# Patient Record
Sex: FEMALE | Race: WHITE | NOT HISPANIC OR LATINO
[De-identification: names, ages, dates, MRNs, and addresses within clinical notes are randomized per-mention and may not be internally consistent; named-entity substitution may affect disease eponyms.]

---

## 2018-08-13 ENCOUNTER — APPOINTMENT (OUTPATIENT)
Dept: ULTRASOUND IMAGING | Facility: CLINIC | Age: 48
End: 2018-08-13
Payer: COMMERCIAL

## 2018-08-13 ENCOUNTER — TRANSCRIPTION ENCOUNTER (OUTPATIENT)
Age: 48
End: 2018-08-13

## 2018-08-13 ENCOUNTER — OUTPATIENT (OUTPATIENT)
Dept: OUTPATIENT SERVICES | Facility: HOSPITAL | Age: 48
LOS: 1 days | End: 2018-08-13
Payer: COMMERCIAL

## 2018-08-13 DIAGNOSIS — Z00.8 ENCOUNTER FOR OTHER GENERAL EXAMINATION: ICD-10-CM

## 2018-08-13 DIAGNOSIS — M79.89 OTHER SPECIFIED SOFT TISSUE DISORDERS: ICD-10-CM

## 2018-08-13 PROBLEM — Z00.00 ENCOUNTER FOR PREVENTIVE HEALTH EXAMINATION: Status: ACTIVE | Noted: 2018-08-13

## 2018-08-13 PROCEDURE — 93971 EXTREMITY STUDY: CPT

## 2018-08-13 PROCEDURE — 93971 EXTREMITY STUDY: CPT | Mod: 26,RT

## 2018-12-20 ENCOUNTER — EMERGENCY (EMERGENCY)
Facility: HOSPITAL | Age: 48
LOS: 1 days | Discharge: HOME | End: 2018-12-20
Attending: EMERGENCY MEDICINE
Payer: COMMERCIAL

## 2018-12-20 ENCOUNTER — TRANSCRIPTION ENCOUNTER (OUTPATIENT)
Age: 48
End: 2018-12-20

## 2018-12-20 VITALS
RESPIRATION RATE: 18 BRPM | DIASTOLIC BLOOD PRESSURE: 102 MMHG | HEIGHT: 63 IN | WEIGHT: 197.98 LBS | OXYGEN SATURATION: 98 % | TEMPERATURE: 98 F | SYSTOLIC BLOOD PRESSURE: 164 MMHG | HEART RATE: 88 BPM

## 2018-12-20 VITALS
OXYGEN SATURATION: 99 % | TEMPERATURE: 98 F | SYSTOLIC BLOOD PRESSURE: 135 MMHG | RESPIRATION RATE: 16 BRPM | HEART RATE: 75 BPM | DIASTOLIC BLOOD PRESSURE: 65 MMHG

## 2018-12-20 LAB
ALBUMIN SERPL ELPH-MCNC: 4.5 G/DL — SIGNIFICANT CHANGE UP (ref 3.3–5.2)
ALP SERPL-CCNC: 72 U/L — SIGNIFICANT CHANGE UP (ref 40–120)
ALT FLD-CCNC: 8 U/L — SIGNIFICANT CHANGE UP
ANION GAP SERPL CALC-SCNC: 13 MMOL/L — SIGNIFICANT CHANGE UP (ref 5–17)
AST SERPL-CCNC: 19 U/L — SIGNIFICANT CHANGE UP
BASOPHILS # BLD AUTO: 0 K/UL — SIGNIFICANT CHANGE UP (ref 0–0.2)
BASOPHILS NFR BLD AUTO: 0.1 % — SIGNIFICANT CHANGE UP (ref 0–2)
BILIRUB SERPL-MCNC: 0.3 MG/DL — LOW (ref 0.4–2)
BUN SERPL-MCNC: 7 MG/DL — LOW (ref 8–20)
CALCIUM SERPL-MCNC: 8.6 MG/DL — SIGNIFICANT CHANGE UP (ref 8.6–10.2)
CHLORIDE SERPL-SCNC: 104 MMOL/L — SIGNIFICANT CHANGE UP (ref 98–107)
CO2 SERPL-SCNC: 22 MMOL/L — SIGNIFICANT CHANGE UP (ref 22–29)
CREAT SERPL-MCNC: 0.62 MG/DL — SIGNIFICANT CHANGE UP (ref 0.5–1.3)
EOSINOPHIL # BLD AUTO: 0 K/UL — SIGNIFICANT CHANGE UP (ref 0–0.5)
EOSINOPHIL NFR BLD AUTO: 0.1 % — SIGNIFICANT CHANGE UP (ref 0–6)
GLUCOSE SERPL-MCNC: 147 MG/DL — HIGH (ref 70–115)
HCT VFR BLD CALC: 36.6 % — LOW (ref 37–47)
HGB BLD-MCNC: 10.6 G/DL — LOW (ref 12–16)
LYMPHOCYTES # BLD AUTO: 0.8 K/UL — LOW (ref 1–4.8)
LYMPHOCYTES # BLD AUTO: 6.6 % — LOW (ref 20–55)
MCHC RBC-ENTMCNC: 22.7 PG — LOW (ref 27–31)
MCHC RBC-ENTMCNC: 29 G/DL — LOW (ref 32–36)
MCV RBC AUTO: 78.4 FL — LOW (ref 81–99)
MONOCYTES # BLD AUTO: 0.7 K/UL — SIGNIFICANT CHANGE UP (ref 0–0.8)
MONOCYTES NFR BLD AUTO: 5.8 % — SIGNIFICANT CHANGE UP (ref 3–10)
NEUTROPHILS # BLD AUTO: 10.6 K/UL — HIGH (ref 1.8–8)
NEUTROPHILS NFR BLD AUTO: 87.2 % — HIGH (ref 37–73)
PLATELET # BLD AUTO: 259 K/UL — SIGNIFICANT CHANGE UP (ref 150–400)
POTASSIUM SERPL-MCNC: 3.5 MMOL/L — SIGNIFICANT CHANGE UP (ref 3.5–5.3)
POTASSIUM SERPL-SCNC: 3.5 MMOL/L — SIGNIFICANT CHANGE UP (ref 3.5–5.3)
PROT SERPL-MCNC: 7.3 G/DL — SIGNIFICANT CHANGE UP (ref 6.6–8.7)
RBC # BLD: 4.67 M/UL — SIGNIFICANT CHANGE UP (ref 4.4–5.2)
RBC # FLD: 17.4 % — HIGH (ref 11–15.6)
SODIUM SERPL-SCNC: 139 MMOL/L — SIGNIFICANT CHANGE UP (ref 135–145)
TROPONIN T SERPL-MCNC: <0.01 NG/ML — SIGNIFICANT CHANGE UP (ref 0–0.06)
WBC # BLD: 12.2 K/UL — HIGH (ref 4.8–10.8)
WBC # FLD AUTO: 12.2 K/UL — HIGH (ref 4.8–10.8)

## 2018-12-20 PROCEDURE — 99284 EMERGENCY DEPT VISIT MOD MDM: CPT | Mod: 25

## 2018-12-20 PROCEDURE — 93005 ELECTROCARDIOGRAM TRACING: CPT

## 2018-12-20 PROCEDURE — 85027 COMPLETE CBC AUTOMATED: CPT

## 2018-12-20 PROCEDURE — 70450 CT HEAD/BRAIN W/O DYE: CPT

## 2018-12-20 PROCEDURE — 36415 COLL VENOUS BLD VENIPUNCTURE: CPT

## 2018-12-20 PROCEDURE — 99284 EMERGENCY DEPT VISIT MOD MDM: CPT

## 2018-12-20 PROCEDURE — 80053 COMPREHEN METABOLIC PANEL: CPT

## 2018-12-20 PROCEDURE — 93010 ELECTROCARDIOGRAM REPORT: CPT

## 2018-12-20 PROCEDURE — 96365 THER/PROPH/DIAG IV INF INIT: CPT

## 2018-12-20 PROCEDURE — 84484 ASSAY OF TROPONIN QUANT: CPT

## 2018-12-20 PROCEDURE — 70450 CT HEAD/BRAIN W/O DYE: CPT | Mod: 26

## 2018-12-20 RX ORDER — BUPROPION HYDROCHLORIDE 150 MG/1
150 TABLET, EXTENDED RELEASE ORAL
Qty: 0 | Refills: 0 | COMMUNITY

## 2018-12-20 RX ORDER — DIAZEPAM 5 MG
5 TABLET ORAL ONCE
Qty: 0 | Refills: 0 | Status: DISCONTINUED | OUTPATIENT
Start: 2018-12-20 | End: 2018-12-20

## 2018-12-20 RX ORDER — METOCLOPRAMIDE HCL 10 MG
10 TABLET ORAL ONCE
Qty: 0 | Refills: 0 | Status: COMPLETED | OUTPATIENT
Start: 2018-12-20 | End: 2018-12-20

## 2018-12-20 RX ORDER — SODIUM CHLORIDE 9 MG/ML
1000 INJECTION INTRAMUSCULAR; INTRAVENOUS; SUBCUTANEOUS ONCE
Qty: 0 | Refills: 0 | Status: COMPLETED | OUTPATIENT
Start: 2018-12-20 | End: 2018-12-20

## 2018-12-20 RX ORDER — TOPIRAMATE 25 MG
1 TABLET ORAL
Qty: 0 | Refills: 0 | COMMUNITY

## 2018-12-20 RX ORDER — GLIMEPIRIDE 1 MG
1 TABLET ORAL
Qty: 0 | Refills: 0 | COMMUNITY

## 2018-12-20 RX ORDER — CLONAZEPAM 1 MG
1 TABLET ORAL
Qty: 0 | Refills: 0 | COMMUNITY

## 2018-12-20 RX ORDER — LITHIUM CARBONATE 300 MG/1
1 TABLET, EXTENDED RELEASE ORAL
Qty: 0 | Refills: 0 | COMMUNITY

## 2018-12-20 RX ADMIN — Medication 104 MILLIGRAM(S): at 20:02

## 2018-12-20 RX ADMIN — SODIUM CHLORIDE 1000 MILLILITER(S): 9 INJECTION INTRAMUSCULAR; INTRAVENOUS; SUBCUTANEOUS at 20:02

## 2018-12-20 RX ADMIN — Medication 5 MILLIGRAM(S): at 20:03

## 2018-12-20 RX ADMIN — Medication 10 MILLIGRAM(S): at 20:32

## 2018-12-20 RX ADMIN — SODIUM CHLORIDE 1000 MILLILITER(S): 9 INJECTION INTRAMUSCULAR; INTRAVENOUS; SUBCUTANEOUS at 21:02

## 2018-12-20 NOTE — ED ADULT TRIAGE NOTE - CHIEF COMPLAINT QUOTE
pt BIBA for reports of "worst headache that's not a migraine pt with hx migraines, photosensitivity and nausea present, pt with jaw pain and SOB/chest pain this AM.

## 2018-12-20 NOTE — ED PROVIDER NOTE - OBJECTIVE STATEMENT
47 y/o F with PMH of HTN and DM c/o sudden onset of headache this afternoon at 3:45pm.  Patient also c/o nausea and photophobia.  Patient denies vomiting.  She states that she has hx of migraines and that this headache is the same intensity level as her usual migraines.  However, patient states that her migraines are usually in the back of her head whereas this headache is on the top.  Patient also states that she had some chest pain earlier this morning which has now resolved.  Denies blurry vision, double vision, focal weaknesses, fever, LOC or any other complaints.

## 2018-12-20 NOTE — ED PROVIDER NOTE - ATTENDING CONTRIBUTION TO CARE
Pt. awake and alert. No acute distress. No nuchal rigidity. labs and CT scan discussed with patient. I have discussed the plan with the ACP.

## 2018-12-30 ENCOUNTER — TRANSCRIPTION ENCOUNTER (OUTPATIENT)
Age: 48
End: 2018-12-30

## 2020-01-08 ENCOUNTER — TELEPHONE (OUTPATIENT)
Dept: URGENT CARE | Facility: CLINIC | Age: 50
End: 2020-01-08

## 2020-01-08 ENCOUNTER — HOSPITAL ENCOUNTER (EMERGENCY)
Facility: HOSPITAL | Age: 50
Discharge: HOME | End: 2020-01-08
Attending: EMERGENCY MEDICINE | Admitting: EMERGENCY MEDICINE

## 2020-01-08 VITALS
BODY MASS INDEX: 38.98 KG/M2 | OXYGEN SATURATION: 97 % | HEIGHT: 63 IN | TEMPERATURE: 98.1 F | HEART RATE: 79 BPM | RESPIRATION RATE: 18 BRPM | DIASTOLIC BLOOD PRESSURE: 93 MMHG | SYSTOLIC BLOOD PRESSURE: 149 MMHG | WEIGHT: 220 LBS

## 2020-01-08 DIAGNOSIS — E86.0 DEHYDRATION: ICD-10-CM

## 2020-01-08 DIAGNOSIS — R73.9 HYPERGLYCEMIA: Primary | ICD-10-CM

## 2020-01-08 DIAGNOSIS — Z91.14 HISTORY OF MEDICATION NONCOMPLIANCE: ICD-10-CM

## 2020-01-08 LAB
ALBUMIN SERPL-MCNC: 4.5 G/DL (ref 3.5–5.2)
ALBUMIN/GLOB SERPL: 1.5 G/DL
ALP SERPL-CCNC: 99 U/L (ref 39–117)
ALT SERPL W P-5'-P-CCNC: 21 U/L (ref 1–33)
ANION GAP SERPL CALCULATED.3IONS-SCNC: 12 MMOL/L (ref 5–15)
AST SERPL-CCNC: 37 U/L (ref 1–32)
B-OH-BUTYR SERPL-SCNC: 0.2 MMOL/L (ref 0.02–0.27)
BASOPHILS # BLD AUTO: 0.05 10*3/MM3 (ref 0–0.2)
BASOPHILS NFR BLD AUTO: 0.6 % (ref 0–1.5)
BILIRUB SERPL-MCNC: 0.3 MG/DL (ref 0.2–1.2)
BILIRUB UR QL STRIP: NEGATIVE
BUN BLD-MCNC: 11 MG/DL (ref 6–20)
BUN/CREAT SERPL: 13.8 (ref 7–25)
CALCIUM SPEC-SCNC: 9.6 MG/DL (ref 8.6–10.5)
CHLORIDE SERPL-SCNC: 101 MMOL/L (ref 98–107)
CLARITY UR: CLEAR
CO2 SERPL-SCNC: 20 MMOL/L (ref 22–29)
COLOR UR: YELLOW
CREAT BLD-MCNC: 0.8 MG/DL (ref 0.57–1)
DEPRECATED RDW RBC AUTO: 45.8 FL (ref 37–54)
EOSINOPHIL # BLD AUTO: 0.18 10*3/MM3 (ref 0–0.4)
EOSINOPHIL NFR BLD AUTO: 2.1 % (ref 0.3–6.2)
ERYTHROCYTE [DISTWIDTH] IN BLOOD BY AUTOMATED COUNT: 16.2 % (ref 12.3–15.4)
GFR SERPL CREATININE-BSD FRML MDRD: 77 ML/MIN/1.73
GLOBULIN UR ELPH-MCNC: 3 GM/DL
GLUCOSE BLD-MCNC: 359 MG/DL (ref 65–99)
GLUCOSE BLDC GLUCOMTR-MCNC: 150 MG/DL (ref 70–130)
GLUCOSE BLDC GLUCOMTR-MCNC: 268 MG/DL (ref 70–130)
GLUCOSE UR STRIP-MCNC: ABNORMAL MG/DL
HBA1C MFR BLD: 12.2 % (ref 4.8–5.6)
HCT VFR BLD AUTO: 43.9 % (ref 34–46.6)
HGB BLD-MCNC: 12.4 G/DL (ref 12–15.9)
HGB UR QL STRIP.AUTO: NEGATIVE
HOLD SPECIMEN: NORMAL
HOLD SPECIMEN: NORMAL
IMM GRANULOCYTES # BLD AUTO: 0.03 10*3/MM3 (ref 0–0.05)
IMM GRANULOCYTES NFR BLD AUTO: 0.4 % (ref 0–0.5)
KETONES UR QL STRIP: NEGATIVE
LEUKOCYTE ESTERASE UR QL STRIP.AUTO: NEGATIVE
LYMPHOCYTES # BLD AUTO: 1.46 10*3/MM3 (ref 0.7–3.1)
LYMPHOCYTES NFR BLD AUTO: 17.2 % (ref 19.6–45.3)
MCH RBC QN AUTO: 22.7 PG (ref 26.6–33)
MCHC RBC AUTO-ENTMCNC: 28.2 G/DL (ref 31.5–35.7)
MCV RBC AUTO: 80.4 FL (ref 79–97)
MONOCYTES # BLD AUTO: 0.66 10*3/MM3 (ref 0.1–0.9)
MONOCYTES NFR BLD AUTO: 7.8 % (ref 5–12)
NEUTROPHILS # BLD AUTO: 6.1 10*3/MM3 (ref 1.7–7)
NEUTROPHILS NFR BLD AUTO: 71.9 % (ref 42.7–76)
NITRITE UR QL STRIP: NEGATIVE
NRBC BLD AUTO-RTO: 0 /100 WBC (ref 0–0.2)
PH UR STRIP.AUTO: 7 [PH] (ref 5–8)
PLATELET # BLD AUTO: 168 10*3/MM3 (ref 140–450)
POTASSIUM BLD-SCNC: 4.6 MMOL/L (ref 3.5–5.2)
PROT SERPL-MCNC: 7.5 G/DL (ref 6–8.5)
PROT UR QL STRIP: NEGATIVE
RBC # BLD AUTO: 5.46 10*6/MM3 (ref 3.77–5.28)
SODIUM BLD-SCNC: 133 MMOL/L (ref 136–145)
SP GR UR STRIP: 1.03 (ref 1–1.03)
UROBILINOGEN UR QL STRIP: ABNORMAL
WBC NRBC COR # BLD: 8.48 10*3/MM3 (ref 3.4–10.8)
WHOLE BLOOD HOLD SPECIMEN: NORMAL
WHOLE BLOOD HOLD SPECIMEN: NORMAL

## 2020-01-08 PROCEDURE — 82962 GLUCOSE BLOOD TEST: CPT

## 2020-01-08 PROCEDURE — 81003 URINALYSIS AUTO W/O SCOPE: CPT | Performed by: EMERGENCY MEDICINE

## 2020-01-08 PROCEDURE — 82010 KETONE BODYS QUAN: CPT | Performed by: EMERGENCY MEDICINE

## 2020-01-08 PROCEDURE — 80053 COMPREHEN METABOLIC PANEL: CPT | Performed by: EMERGENCY MEDICINE

## 2020-01-08 PROCEDURE — 85025 COMPLETE CBC W/AUTO DIFF WBC: CPT | Performed by: EMERGENCY MEDICINE

## 2020-01-08 PROCEDURE — 99284 EMERGENCY DEPT VISIT MOD MDM: CPT

## 2020-01-08 PROCEDURE — 83036 HEMOGLOBIN GLYCOSYLATED A1C: CPT | Performed by: EMERGENCY MEDICINE

## 2020-01-08 PROCEDURE — 63710000001 INSULIN REGULAR HUMAN PER 5 UNITS: Performed by: EMERGENCY MEDICINE

## 2020-01-08 RX ORDER — SODIUM CHLORIDE 0.9 % (FLUSH) 0.9 %
10 SYRINGE (ML) INJECTION AS NEEDED
Status: DISCONTINUED | OUTPATIENT
Start: 2020-01-08 | End: 2020-01-08 | Stop reason: HOSPADM

## 2020-01-08 RX ORDER — TOPIRAMATE 100 MG/1
100 TABLET, FILM COATED ORAL 2 TIMES DAILY
COMMUNITY

## 2020-01-08 RX ORDER — LITHIUM CARBONATE 300 MG/1
300 CAPSULE ORAL 2 TIMES DAILY WITH MEALS
COMMUNITY

## 2020-01-08 RX ORDER — LANCETS 28 GAUGE
EACH MISCELLANEOUS
Qty: 60 EACH | Refills: 0 | Status: SHIPPED | OUTPATIENT
Start: 2020-01-08 | End: 2021-09-13

## 2020-01-08 RX ORDER — CLONAZEPAM 0.5 MG/1
0.5 TABLET ORAL 3 TIMES DAILY PRN
COMMUNITY
End: 2021-01-26

## 2020-01-08 RX ORDER — PRAVASTATIN SODIUM 40 MG
40 TABLET ORAL DAILY
COMMUNITY
End: 2020-01-23 | Stop reason: SDUPTHER

## 2020-01-08 RX ORDER — ENALAPRIL MALEATE 5 MG/1
5 TABLET ORAL DAILY
COMMUNITY
End: 2020-01-23 | Stop reason: SDUPTHER

## 2020-01-08 RX ORDER — BUPROPION HYDROCHLORIDE 150 MG/1
150 TABLET ORAL DAILY
COMMUNITY

## 2020-01-08 RX ORDER — GLIMEPIRIDE 2 MG/1
1 TABLET ORAL 2 TIMES DAILY
Qty: 60 TABLET | Refills: 0 | Status: SHIPPED | OUTPATIENT
Start: 2020-01-08 | End: 2020-01-23 | Stop reason: SDUPTHER

## 2020-01-08 RX ORDER — GLIMEPIRIDE 2 MG/1
2 TABLET ORAL 2 TIMES DAILY
COMMUNITY
End: 2020-01-08 | Stop reason: SDUPTHER

## 2020-01-08 RX ADMIN — INSULIN HUMAN 8 UNITS: 100 INJECTION, SOLUTION PARENTERAL at 18:46

## 2020-01-08 RX ADMIN — SODIUM CHLORIDE 1000 ML: 9 INJECTION, SOLUTION INTRAVENOUS at 18:26

## 2020-01-08 NOTE — ED PROVIDER NOTES
Subjective   Patricia Brown is a 49 y.o.female who presents to the ED with complaints of increased frequency of urination. The patient has been experiencing increased frequency of urination recently. Of note, she has a history of diabetes. She moved to [place] three months ago and she does not have a primary care provider. She states she has been out of her diabetes medications since August. She takes Pravastatin, Glimepiride, and Trulicity daily. She states she can not take Metformin. She adds that her blood sugar has been in the 300's lately and it is usually around 120. She denies any abdominal pain. There are no other complaints at this time.       History provided by:  Patient  Urinary Frequency   Location:  Urinary  Quality:  Increased frequency  Severity:  Moderate  Onset quality:  Sudden  Duration: couple days.  Timing:  Constant  Progression:  Unchanged  Chronicity:  New  Context:  Hx DM. has not taken meds since August. Polyuria  Relieved by:  None tried  Worsened by:  Nothing  Ineffective treatments:  None tried  Associated symptoms: no abdominal pain        Review of Systems   Gastrointestinal: Negative for abdominal pain.   Endocrine: Positive for polyuria.   Genitourinary: Positive for frequency.   All other systems reviewed and are negative.      Past Medical History:   Diagnosis Date   • Bipolar 1 disorder (CMS/HCC)    • Cancer (CMS/HCC)     hx cervical cancer   • Diabetes mellitus (CMS/HCC)    • Hypertension        No Known Allergies    Past Surgical History:   Procedure Laterality Date   • CERVIX SURGERY      cervical scraping for cancer       History reviewed. No pertinent family history.    Social History     Socioeconomic History   • Marital status:      Spouse name: Not on file   • Number of children: Not on file   • Years of education: Not on file   • Highest education level: Not on file   Tobacco Use   • Smoking status: Former Smoker     Last attempt to quit: 2005     Years since  quitting: 15.0   Substance and Sexual Activity   • Alcohol use: Not Currently   • Drug use: Never   • Sexual activity: Defer         Objective   Physical Exam   Constitutional: She is oriented to person, place, and time. She appears well-developed and well-nourished.   HENT:   Head: Normocephalic and atraumatic.   Eyes: Conjunctivae are normal. No scleral icterus.   Neck: Normal range of motion. Neck supple.   Cardiovascular: Normal rate, regular rhythm, normal heart sounds and intact distal pulses.   No murmur heard.  Pulmonary/Chest: Effort normal and breath sounds normal. No respiratory distress.   Abdominal: Soft. Bowel sounds are normal. There is no tenderness.   Musculoskeletal: Normal range of motion. She exhibits no edema.   Neurological: She is alert and oriented to person, place, and time.   Skin: Skin is warm and dry.   Psychiatric: She has a normal mood and affect. Her behavior is normal.   Nursing note and vitals reviewed.      Procedures         ED Course  ED Course as of Jan 08 2257 Wed Jan 08, 2020 1937 It appears that the patient has run out of all of her medications and has not scheduled any primary care or endocrinology follow-ups.  She is on numerous medications.  I will not refill all of them.  I will not give her any antihypertensive especially given her current blood pressures.  I have cautioned her that her lack of medical compliance could lead to an early death or disability.    [MS]   2003 Hemoglobin A1C(!): 12.20 [MS]   2003 Glucose(!): 359 [MS]      ED Course User Index  [MS] Jozef Morin MD     Recent Results (from the past 24 hour(s))   Light Blue Top    Collection Time: 01/08/20 12:49 PM   Result Value Ref Range    Extra Tube hold for add-on    Green Top (Gel)    Collection Time: 01/08/20 12:49 PM   Result Value Ref Range    Extra Tube Hold for add-ons.    Lavender Top    Collection Time: 01/08/20 12:49 PM   Result Value Ref Range    Extra Tube hold for add-on    Gold Top - SST     Collection Time: 01/08/20 12:49 PM   Result Value Ref Range    Extra Tube Hold for add-ons.    Hemoglobin A1c    Collection Time: 01/08/20 12:49 PM   Result Value Ref Range    Hemoglobin A1C 12.20 (H) 4.80 - 5.60 %   Comprehensive Metabolic Panel    Collection Time: 01/08/20 12:49 PM   Result Value Ref Range    Glucose 359 (H) 65 - 99 mg/dL    BUN 11 6 - 20 mg/dL    Creatinine 0.80 0.57 - 1.00 mg/dL    Sodium 133 (L) 136 - 145 mmol/L    Potassium 4.6 3.5 - 5.2 mmol/L    Chloride 101 98 - 107 mmol/L    CO2 20.0 (L) 22.0 - 29.0 mmol/L    Calcium 9.6 8.6 - 10.5 mg/dL    Total Protein 7.5 6.0 - 8.5 g/dL    Albumin 4.50 3.50 - 5.20 g/dL    ALT (SGPT) 21 1 - 33 U/L    AST (SGOT) 37 (H) 1 - 32 U/L    Alkaline Phosphatase 99 39 - 117 U/L    Total Bilirubin 0.3 0.2 - 1.2 mg/dL    eGFR Non African Amer 77 >60 mL/min/1.73    Globulin 3.0 gm/dL    A/G Ratio 1.5 g/dL    BUN/Creatinine Ratio 13.8 7.0 - 25.0    Anion Gap 12.0 5.0 - 15.0 mmol/L   Beta Hydroxybutyrate Quantitative    Collection Time: 01/08/20 12:49 PM   Result Value Ref Range    Beta-Hydroxybutyrate Quant 0.197 0.020 - 0.270 mmol/L   CBC Auto Differential    Collection Time: 01/08/20 12:49 PM   Result Value Ref Range    WBC 8.48 3.40 - 10.80 10*3/mm3    RBC 5.46 (H) 3.77 - 5.28 10*6/mm3    Hemoglobin 12.4 12.0 - 15.9 g/dL    Hematocrit 43.9 34.0 - 46.6 %    MCV 80.4 79.0 - 97.0 fL    MCH 22.7 (L) 26.6 - 33.0 pg    MCHC 28.2 (L) 31.5 - 35.7 g/dL    RDW 16.2 (H) 12.3 - 15.4 %    RDW-SD 45.8 37.0 - 54.0 fl    Platelets 168 140 - 450 10*3/mm3    Neutrophil % 71.9 42.7 - 76.0 %    Lymphocyte % 17.2 (L) 19.6 - 45.3 %    Monocyte % 7.8 5.0 - 12.0 %    Eosinophil % 2.1 0.3 - 6.2 %    Basophil % 0.6 0.0 - 1.5 %    Immature Grans % 0.4 0.0 - 0.5 %    Neutrophils, Absolute 6.10 1.70 - 7.00 10*3/mm3    Lymphocytes, Absolute 1.46 0.70 - 3.10 10*3/mm3    Monocytes, Absolute 0.66 0.10 - 0.90 10*3/mm3    Eosinophils, Absolute 0.18 0.00 - 0.40 10*3/mm3    Basophils, Absolute 0.05  0.00 - 0.20 10*3/mm3    Immature Grans, Absolute 0.03 0.00 - 0.05 10*3/mm3    nRBC 0.0 0.0 - 0.2 /100 WBC   POC Glucose Once    Collection Time: 01/08/20  4:48 PM   Result Value Ref Range    Glucose 268 (H) 70 - 130 mg/dL   Urinalysis With Microscopic If Indicated (No Culture) - Urine, Clean Catch    Collection Time: 01/08/20  6:28 PM   Result Value Ref Range    Color, UA Yellow Yellow, Straw    Appearance, UA Clear Clear    pH, UA 7.0 5.0 - 8.0    Specific Gravity, UA 1.027 1.001 - 1.030    Glucose, UA >=1000 mg/dL (3+) (A) Negative    Ketones, UA Negative Negative    Bilirubin, UA Negative Negative    Blood, UA Negative Negative    Protein, UA Negative Negative    Leuk Esterase, UA Negative Negative    Nitrite, UA Negative Negative    Urobilinogen, UA 0.2 E.U./dL 0.2 - 1.0 E.U./dL   POC Glucose Once    Collection Time: 01/08/20  7:39 PM   Result Value Ref Range    Glucose 150 (H) 70 - 130 mg/dL     Note: In addition to lab results from this visit, the labs listed above may include labs taken at another facility or during a different encounter within the last 24 hours. Please correlate lab times with ED admission and discharge times for further clarification of the services performed during this visit.    No orders to display     Vitals:    01/08/20 1945 01/08/20 2000 01/08/20 2015 01/08/20 2035   BP: 141/92 137/92 149/93 149/93   BP Location:    Right arm   Patient Position:    Lying   Pulse:    79   Resp:    18   Temp:       TempSrc:       SpO2: 98% 95%  97%   Weight:       Height:         Medications   sodium chloride 0.9 % bolus 2,000 mL (0 mL Intravenous Stopped 1/8/20 2025)   insulin regular (humuLIN R,novoLIN R) injection 8 Units (8 Units Intravenous Given 1/8/20 1846)     ECG/EMG Results (last 24 hours)     ** No results found for the last 24 hours. **        No orders to display                       MDM    Final diagnoses:   Hyperglycemia   Dehydration   History of medication noncompliance        Documentation assistance provided by rudy Dimas.  Information recorded by the scribe was done at my direction and has been verified and validated by me.     Graham Dimas  01/08/20 [id number]       Jozef Morin MD  01/08/20 [id number]

## 2020-01-08 NOTE — TELEPHONE ENCOUNTER
Called patient for clarification re: chief complaint for today's visit.  She states that she feels symptoms are not UTI related but are secondary to diabetic complications.  States she has not had any diabetic meds since August and has been unable to establish care with endocrinologist; glucose has been around 340.  Advised patient to go to ER per Dr. Boswell.  Patient states she will come by and get a PCP list since she is close by then will go to [facility] ER.  Son driving patient.

## 2020-01-09 NOTE — DISCHARGE INSTRUCTIONS
You have a hemoglobin A1c of 12.2.  This averages out to a blood sugar of over 300.    Please be careful with your health.    I have restarted 2 of your medications.  Please follow-up with primary care to see what other medications may need to be started or adjustments may need to be made.    I have placed a referral order for you to see an internist or family practice physician from [facility] [place].  They should be calling you tomorrow.  Please call them tomorrow afternoon if they have not contacted you.    It is extremely important for you to monitor your blood sugars closely and bring a chart to your follow-up appointment.  The follow-up physician will base further treatment on your blood sugars and current medication regimen.    I have left your other medications on your medication list even though you are not taking them.  This is so that other physicians can see what was previously ordered.

## 2020-01-23 ENCOUNTER — OFFICE VISIT (OUTPATIENT)
Dept: FAMILY MEDICINE CLINIC | Facility: CLINIC | Age: 50
End: 2020-01-23

## 2020-01-23 VITALS
RESPIRATION RATE: 18 BRPM | SYSTOLIC BLOOD PRESSURE: 112 MMHG | WEIGHT: 222 LBS | HEIGHT: 63 IN | OXYGEN SATURATION: 98 % | TEMPERATURE: 98 F | DIASTOLIC BLOOD PRESSURE: 84 MMHG | HEART RATE: 85 BPM | BODY MASS INDEX: 39.34 KG/M2

## 2020-01-23 DIAGNOSIS — Z13.220 SCREENING, LIPID: ICD-10-CM

## 2020-01-23 DIAGNOSIS — I10 HYPERTENSION, UNSPECIFIED TYPE: Primary | ICD-10-CM

## 2020-01-23 DIAGNOSIS — H61.92 SKIN LESION OF LEFT EAR: ICD-10-CM

## 2020-01-23 DIAGNOSIS — E66.9 OBESITY, CLASS II, BMI 35-39.9, ISOLATED (SEE ACTUAL BMI): ICD-10-CM

## 2020-01-23 DIAGNOSIS — Z00.00 ENCOUNTER FOR MEDICAL EXAMINATION TO ESTABLISH CARE: ICD-10-CM

## 2020-01-23 DIAGNOSIS — E11.9 TYPE 2 DIABETES MELLITUS WITHOUT COMPLICATION, WITHOUT LONG-TERM CURRENT USE OF INSULIN (HCC): ICD-10-CM

## 2020-01-23 LAB
BILIRUB BLD-MCNC: NEGATIVE MG/DL
CLARITY, POC: CLEAR
COLOR UR: YELLOW
GLUCOSE UR STRIP-MCNC: ABNORMAL MG/DL
KETONES UR QL: NEGATIVE
LEUKOCYTE EST, POC: NEGATIVE
NITRITE UR-MCNC: NEGATIVE MG/ML
PH UR: 7 [PH] (ref 5–8)
PROT UR STRIP-MCNC: NEGATIVE MG/DL
RBC # UR STRIP: NEGATIVE /UL
SP GR UR: 1.02 (ref 1–1.03)
UROBILINOGEN UR QL: NORMAL

## 2020-01-23 PROCEDURE — 84436 ASSAY OF TOTAL THYROXINE: CPT | Performed by: NURSE PRACTITIONER

## 2020-01-23 PROCEDURE — 84443 ASSAY THYROID STIM HORMONE: CPT | Performed by: NURSE PRACTITIONER

## 2020-01-23 PROCEDURE — 80061 LIPID PANEL: CPT | Performed by: NURSE PRACTITIONER

## 2020-01-23 PROCEDURE — 99203 OFFICE O/P NEW LOW 30 MIN: CPT | Performed by: NURSE PRACTITIONER

## 2020-01-23 PROCEDURE — 80053 COMPREHEN METABOLIC PANEL: CPT | Performed by: NURSE PRACTITIONER

## 2020-01-23 PROCEDURE — 82306 VITAMIN D 25 HYDROXY: CPT | Performed by: NURSE PRACTITIONER

## 2020-01-23 PROCEDURE — 81003 URINALYSIS AUTO W/O SCOPE: CPT | Performed by: NURSE PRACTITIONER

## 2020-01-23 PROCEDURE — 36415 COLL VENOUS BLD VENIPUNCTURE: CPT | Performed by: NURSE PRACTITIONER

## 2020-01-23 RX ORDER — PRAVASTATIN SODIUM 40 MG
40 TABLET ORAL DAILY
Qty: 30 TABLET | Refills: 2 | Status: SHIPPED | OUTPATIENT
Start: 2020-01-23 | End: 2020-07-14 | Stop reason: SDUPTHER

## 2020-01-23 RX ORDER — ENALAPRIL MALEATE 5 MG/1
5 TABLET ORAL DAILY
Qty: 30 TABLET | Refills: 5 | Status: SHIPPED | OUTPATIENT
Start: 2020-01-23 | End: 2020-07-08 | Stop reason: SDUPTHER

## 2020-01-23 RX ORDER — GLIMEPIRIDE 2 MG/1
2 TABLET ORAL
Qty: 60 TABLET | Refills: 2 | Status: SHIPPED | OUTPATIENT
Start: 2020-01-23 | End: 2020-07-07

## 2020-01-23 NOTE — PROGRESS NOTES
Subjective   Patricia Brown is a 49 y.o. female.   Chief Complaint   Patient presents with   • Establish Care      Diabetes   She presents for her follow-up diabetic visit. She has type 2 diabetes mellitus. No MedicAlert identification noted. Her disease course has been fluctuating. Pertinent negatives for hypoglycemia include no headaches or mood changes. Pertinent negatives for diabetes include no blurred vision, no fatigue, no foot ulcerations, no polydipsia, no polyphagia, no polyuria, no visual change, no weakness and no weight loss. Symptoms are worsening. Risk factors for coronary artery disease include sedentary lifestyle and obesity. Current diabetic treatment includes diet and oral agent (dual therapy). She is compliant with treatment some of the time. She is following a diabetic diet. She participates in exercise three times a week. Her home blood glucose trend is increasing rapidly. An ACE inhibitor/angiotensin II receptor blocker is being taken. She does not see a podiatrist.Eye exam is not current.      Patient is here to establish care. She has recently seen   Anabell Field - PCP [facility]   Cardiologist Calvin - [phone]   Will need to get records.     Patient recently moved to the area from [place], [place]   She has been in [place] since May 2019. She was recently seen in ER for hyperglycemia. She had been out of her DM medication for 4 months.         The following portions of the patient's history were reviewed and updated as appropriate: allergies, current medications, past family history, past medical history, past social history, past surgical history and problem list.    Review of Systems   Constitutional: Negative.  Negative for fatigue and weight loss.   Eyes: Negative.  Negative for blurred vision.   Respiratory: Negative.    Cardiovascular: Negative.    Gastrointestinal: Negative.    Endocrine: Negative for polydipsia, polyphagia and polyuria.   Genitourinary: Negative.    Musculoskeletal:  "Negative.    Allergic/Immunologic: Negative.    Neurological: Negative for weakness and headaches.   Hematological: Negative.    Psychiatric/Behavioral: Negative.      Past Surgical History:   Procedure Laterality Date   • CERVIX SURGERY      cervical scraping for cancer     Past Medical History:   Diagnosis Date   • Bipolar 1 disorder (CMS/HCC)    • Cancer (CMS/HCC)     hx cervical cancer -    • Diabetes mellitus (CMS/HCC)    • Fibrocystic breast    • Heart murmur    • Hyperlipidemia    • Hypertension    • Obesity    • Visual impairment     glasses        Objective   No Known Allergies  Visit Vitals  /84 (BP Location: Left arm, Patient Position: Sitting, Cuff Size: Adult)   Pulse 85   Temp 98 °F (36.7 °C) (Temporal)   Resp 18   Ht 160 cm (63\")   Wt 101 kg (222 lb)   LMP 12/23/2019 (Approximate)   SpO2 98%   BMI 39.33 kg/m²       Physical Exam   Constitutional: She is oriented to person, place, and time. Vital signs are normal. She appears well-developed and well-nourished. She is cooperative. She does not appear ill.   HENT:   Head: Normocephalic.   Mouth/Throat: Uvula is midline and oropharynx is clear and moist.   Eyes: Pupils are equal, round, and reactive to light.   Neck: Trachea normal and normal range of motion. Carotid bruit is not present. No thyroid mass and no thyromegaly present.   Cardiovascular: Normal rate, regular rhythm, normal heart sounds and intact distal pulses.   Pulmonary/Chest: Effort normal and breath sounds normal.   Abdominal: Soft. Bowel sounds are normal.   Musculoskeletal: Normal range of motion.   Lymphadenopathy:     She has no cervical adenopathy.   Neurological: She is alert and oriented to person, place, and time.   Skin: Skin is warm, dry and intact. Capillary refill takes less than 2 seconds.   Psychiatric: She has a normal mood and affect. Her speech is normal and behavior is normal. Cognition and memory are normal.   Vitals reviewed.      Assessment/Plan   Patricia was seen " today for establish care.    Diagnoses and all orders for this visit:    Hypertension, unspecified type  -     enalapril (VASOTEC) 5 MG tablet; Take 1 tablet by mouth Daily.  -     Comprehensive Metabolic Panel    Encounter for medical examination to establish care  -     Comprehensive Metabolic Panel  -     POC Urinalysis Dipstick, Automated  -     TSH  -     T4  -     Vitamin D 25 Hydroxy    Type 2 diabetes mellitus without complication, without long-term current use of insulin (CMS/[facility])  -     glimepiride (AMARYL) 2 MG tablet; Take 1 tablet by mouth Every Morning Before Breakfast.  -     pravastatin (PRAVACHOL) 40 MG tablet; Take 1 tablet by mouth Daily.  -     Dulaglutide (TRULICITY) 1.5 MG/0.5ML solution pen-injector; Inject 1.5 mg under the skin into the appropriate area as directed 1 (One) Time Per Week.  -     Ambulatory Referral to Endocrinology    Skin lesion of left ear  -     mupirocin (BACTROBAN) 2 % ointment; Apply  topically to the appropriate area as directed 3 (Three) Times a Day.    Screening, lipid  -     Lipid Panel    Obesity, Class II, BMI 35-39.9, isolated (see actual BMI)    Other orders  -     glucose blood test strip; 1 each by Other route 3 (Three) Times a Day As Needed (three times a day and prn). Use as instructed      Declined dietician - has already been  She is willing to see endocrinology.     Follow up in 4 weeks  Patient Counseling:  --Nutrition: Stressed importance of moderation in sodium/caffeine intake, saturated fat and cholesterol, caloric balance, sufficient intake of fresh fruits, vegetables, fiber, calcium, iron--Exercise: Stressed the importance of regular exercise.   --Substance Abuse: Discussed cessation/primary prevention of tobacco, alcohol, or other drug use; driving or other dangerous activities under the influence; availability of treatment for abuse.    --Sexuality: Discussed sexually transmitted diseases, partner selection, use of condoms, avoidance of unintended  pregnancy and contraceptive alternatives.   --Injury prevention: Discussed safety belts, safety helmets, smoke detector, smoking near bedding or upholstery.   --Dental health: Discussed importance of regular tooth brushing, flossing, and dental visits.  --Immunizations reviewed.    --After hours’ service discussed with patient    Discussed the nature of the medical condition(s) risks, complications, implications, and management, safe and proper use of medications. Encouraged medication compliance, and keeping scheduled follow up appointments with me and any other providers.            Patient Instructions   Calorie Counting for Weight Loss  Calories are units of energy. Your body needs a certain amount of calories from food to keep you going throughout the day. When you eat more calories than your body needs, your body stores the extra calories as fat. When you eat fewer calories than your body needs, your body burns fat to get the energy it needs.  Calorie counting means keeping track of how many calories you eat and drink each day. Calorie counting can be helpful if you need to lose weight. If you make sure to eat fewer calories than your body needs, you should lose weight. Ask your health care provider what a healthy weight is for you.  For calorie counting to work, you will need to eat the right number of calories in a day in order to lose a healthy amount of weight per week. A dietitian can help you determine how many calories you need in a day and will give you suggestions on how to reach your calorie goal.  · A healthy amount of weight to lose per week is usually 1-2 lb (0.5-0.9 kg). This usually means that your daily calorie intake should be reduced by 500-750 calories.  · Eating 1,200 - 1,500 calories per day can help most women lose weight.  · Eating 1,500 - 1,800 calories per day can help most men lose weight.  What is my plan?  My goal is to have __________ calories per day.  If I have this many calories  per day, I should lose around __________ pounds per week.  What do I need to know about calorie counting?  In order to meet your daily calorie goal, you will need to:  · Find out how many calories are in each food you would like to eat. Try to do this before you eat.  · Decide how much of the food you plan to eat.  · Write down what you ate and how many calories it had. Doing this is called keeping a food log.  To successfully lose weight, it is important to balance calorie counting with a healthy lifestyle that includes regular activity. Aim for 150 minutes of moderate exercise (such as walking) or 75 minutes of vigorous exercise (such as running) each week.  Where do I find calorie information?    The number of calories in a food can be found on a Nutrition Facts label. If a food does not have a Nutrition Facts label, try to look up the calories online or ask your dietitian for help.  Remember that calories are listed per serving. If you choose to have more than one serving of a food, you will have to multiply the calories per serving by the amount of servings you plan to eat. For example, the label on a package of bread might say that a serving size is 1 slice and that there are 90 calories in a serving. If you eat 1 slice, you will have eaten 90 calories. If you eat 2 slices, you will have eaten 180 calories.  How do I keep a food log?  Immediately after each meal, record the following information in your food log:  · What you ate. Don't forget to include toppings, sauces, and other extras on the food.  · How much you ate. This can be measured in cups, ounces, or number of items.  · How many calories each food and drink had.  · The total number of calories in the meal.  Keep your food log near you, such as in a small notebook in your pocket, or use a mobile patria or website. Some programs will calculate calories for you and show you how many calories you have left for the day to meet your goal.  What are some  "calorie counting tips?    · Use your calories on foods and drinks that will fill you up and not leave you hungry:  ? Some examples of foods that fill you up are nuts and nut butters, vegetables, lean proteins, and high-fiber foods like whole grains. High-fiber foods are foods with more than 5 g fiber per serving.  ? Drinks such as sodas, specialty coffee drinks, alcohol, and juices have a lot of calories, yet do not fill you up.  · Eat nutritious foods and avoid empty calories. Empty calories are calories you get from foods or beverages that do not have many vitamins or protein, such as candy, sweets, and soda. It is better to have a nutritious high-calorie food (such as an avocado) than a food with few nutrients (such as a bag of chips).  · Know how many calories are in the foods you eat most often. This will help you calculate calorie counts faster.  · Pay attention to calories in drinks. Low-calorie drinks include water and unsweetened drinks.  · Pay attention to nutrition labels for \"low fat\" or \"fat free\" foods. These foods sometimes have the same amount of calories or more calories than the full fat versions. They also often have added sugar, starch, or salt, to make up for flavor that was removed with the fat.  · Find a way of tracking calories that works for you. Get creative. Try different apps or programs if writing down calories does not work for you.  What are some portion control tips?  · Know how many calories are in a serving. This will help you know how many servings of a certain food you can have.  · Use a measuring cup to measure serving sizes. You could also try weighing out portions on a kitchen scale. With time, you will be able to estimate serving sizes for some foods.  · Take some time to put servings of different foods on your favorite plates, bowls, and cups so you know what a serving looks like.  · Try not to eat straight from a bag or box. Doing this can lead to overeating. Put the amount " "you would like to eat in a cup or on a plate to make sure you are eating the right portion.  · Use smaller plates, glasses, and bowls to prevent overeating.  · Try not to multitask (for example, watch TV or use your computer) while eating. If it is time to eat, sit down at a table and enjoy your food. This will help you to know when you are full. It will also help you to be aware of what you are eating and how much you are eating.  What are tips for following this plan?  Reading food labels  · Check the calorie count compared to the serving size. The serving size may be smaller than what you are used to eating.  · Check the source of the calories. Make sure the food you are eating is high in vitamins and protein and low in saturated and trans fats.  Shopping  · Read nutrition labels while you shop. This will help you make healthy decisions before you decide to purchase your food.  · Make a grocery list and stick to it.  Cooking  · Try to cook your favorite foods in a healthier way. For example, try baking instead of frying.  · Use low-fat dairy products.  Meal planning  · Use more fruits and vegetables. Half of your plate should be fruits and vegetables.  · Include lean proteins like poultry and fish.  How do I count calories when eating out?  · Ask for smaller portion sizes.  · Consider sharing an entree and sides instead of getting your own entree.  · If you get your own entree, eat only half. Ask for a box at the beginning of your meal and put the rest of your entree in it so you are not tempted to eat it.  · If calories are listed on the menu, choose the lower calorie options.  · Choose dishes that include vegetables, fruits, whole grains, low-fat dairy products, and lean protein.  · Choose items that are boiled, broiled, grilled, or steamed. Stay away from items that are buttered, battered, fried, or served with cream sauce. Items labeled \"crispy\" are usually fried, unless stated otherwise.  · Choose water, " low-fat milk, unsweetened iced tea, or other drinks without added sugar. If you want an alcoholic beverage, choose a lower calorie option such as a glass of wine or light beer.  · Ask for dressings, sauces, and syrups on the side. These are usually high in calories, so you should limit the amount you eat.  · If you want a salad, choose a garden salad and ask for grilled meats. Avoid extra toppings like couch, cheese, or fried items. Ask for the dressing on the side, or ask for olive oil and vinegar or lemon to use as dressing.  · Estimate how many servings of a food you are given. For example, a serving of cooked rice is ½ cup or about the size of half a baseball. Knowing serving sizes will help you be aware of how much food you are eating at restaurants. The list below tells you how big or small some common portion sizes are based on everyday objects:  ? 1 oz--4 stacked dice.  ? 3 oz--1 deck of cards.  ? 1 tsp--1 die.  ? 1 Tbsp--½ a ping-pong ball.  ? 2 Tbsp--1 ping-pong ball.  ? ½ cup--½ baseball.  ? 1 cup--1 baseball.  Summary  · Calorie counting means keeping track of how many calories you eat and drink each day. If you eat fewer calories than your body needs, you should lose weight.  · A healthy amount of weight to lose per week is usually 1-2 lb (0.5-0.9 kg). This usually means reducing your daily calorie intake by 500-750 calories.  · The number of calories in a food can be found on a Nutrition Facts label. If a food does not have a Nutrition Facts label, try to look up the calories online or ask your dietitian for help.  · Use your calories on foods and drinks that will fill you up, and not on foods and drinks that will leave you hungry.  · Use smaller plates, glasses, and bowls to prevent overeating.  This information is not intended to replace advice given to you by your health care provider. Make sure you discuss any questions you have with your health care provider.  Document Released: 12/18/2006  Document Revised: 09/06/2019 Document Reviewed: 11/17/2017  [employer] Interactive Patient Education © 2019 [employer]  Diabetes Mellitus and Nutrition, Adult  When you have diabetes (diabetes mellitus), it is very important to have healthy eating habits because your blood sugar (glucose) levels are greatly affected by what you eat and drink. Eating healthy foods in the appropriate amounts, at about the same times every day, can help you:  · Control your blood glucose.  · Lower your risk of heart disease.  · Improve your blood pressure.  · Reach or maintain a healthy weight.  Every person with diabetes is different, and each person has different needs for a meal plan. Your health care provider may recommend that you work with a diet and nutrition specialist (dietitian) to make a meal plan that is best for you. Your meal plan may vary depending on factors such as:  · The calories you need.  · The medicines you take.  · Your weight.  · Your blood glucose, blood pressure, and cholesterol levels.  · Your activity level.  · Other health conditions you have, such as heart or kidney disease.  How do carbohydrates affect me?  Carbohydrates, also called carbs, affect your blood glucose level more than any other type of food. Eating carbs naturally raises the amount of glucose in your blood. Carb counting is a method for keeping track of how many carbs you eat. Counting carbs is important to keep your blood glucose at a healthy level, especially if you use insulin or take certain oral diabetes medicines.  It is important to know how many carbs you can safely have in each meal. This is different for every person. Your dietitian can help you calculate how many carbs you should have at each meal and for each snack.  Foods that contain carbs include:  · Bread, cereal, rice, pasta, and crackers.  · Potatoes and corn.  · Peas, beans, and lentils.  · Milk and yogurt.  · Fruit and juice.  · Desserts, such as cakes, cookies, ice  "cream, and candy.  How does alcohol affect me?  Alcohol can cause a sudden decrease in blood glucose (hypoglycemia), especially if you use insulin or take certain oral diabetes medicines. Hypoglycemia can be a life-threatening condition. Symptoms of hypoglycemia (sleepiness, dizziness, and confusion) are similar to symptoms of having too much alcohol.  If your health care provider says that alcohol is safe for you, follow these guidelines:  · Limit alcohol intake to no more than 1 drink per day for nonpregnant women and 2 drinks per day for men. One drink equals 12 oz of beer, 5 oz of wine, or 1½ oz of hard liquor.  · Do not drink on an empty stomach.  · Keep yourself hydrated with water, diet soda, or unsweetened iced tea.  · Keep in mind that regular soda, juice, and other mixers may contain a lot of sugar and must be counted as carbs.  What are tips for following this plan?    Reading food labels  · Start by checking the serving size on the \"Nutrition Facts\" label of packaged foods and drinks. The amount of calories, carbs, fats, and other nutrients listed on the label is based on one serving of the item. Many items contain more than one serving per package.  · Check the total grams (g) of carbs in one serving. You can calculate the number of servings of carbs in one serving by dividing the total carbs by 15. For example, if a food has 30 g of total carbs, it would be equal to 2 servings of carbs.  · Check the number of grams (g) of saturated and trans fats in one serving. Choose foods that have low or no amount of these fats.  · Check the number of milligrams (mg) of salt (sodium) in one serving. Most people should limit total sodium intake to less than 2,300 mg per day.  · Always check the nutrition information of foods labeled as \"low-fat\" or \"nonfat\". These foods may be higher in added sugar or refined carbs and should be avoided.  · Talk to your dietitian to identify your daily goals for nutrients listed on " the label.  Shopping  · Avoid buying canned, premade, or processed foods. These foods tend to be high in fat, sodium, and added sugar.  · Shop around the outside edge of the grocery store. This includes fresh fruits and vegetables, bulk grains, fresh meats, and fresh dairy.  Cooking  · Use low-heat cooking methods, such as baking, instead of high-heat cooking methods like deep frying.  · Cook using healthy oils, such as olive, canola, or sunflower oil.  · Avoid cooking with butter, cream, or high-fat meats.  Meal planning  · Eat meals and snacks regularly, preferably at the same times every day. Avoid going long periods of time without eating.  · Eat foods high in fiber, such as fresh fruits, vegetables, beans, and whole grains. Talk to your dietitian about how many servings of carbs you can eat at each meal.  · Eat 4-6 ounces (oz) of lean protein each day, such as lean meat, chicken, fish, eggs, or tofu. One oz of lean protein is equal to:  ? 1 oz of meat, chicken, or fish.  ? 1 egg.  ? ¼ cup of tofu.  · Eat some foods each day that contain healthy fats, such as avocado, nuts, seeds, and fish.  Lifestyle  · Check your blood glucose regularly.  · Exercise regularly as told by your health care provider. This may include:  ? 150 minutes of moderate-intensity or vigorous-intensity exercise each week. This could be brisk walking, biking, or water aerobics.  ? Stretching and doing strength exercises, such as yoga or weightlifting, at least 2 times a week.  · Take medicines as told by your health care provider.  · Do not use any products that contain nicotine or tobacco, such as cigarettes and e-cigarettes. If you need help quitting, ask your health care provider.  · Work with a counselor or diabetes educator to identify strategies to manage stress and any emotional and social challenges.  Questions to ask a health care provider  · Do I need to meet with a diabetes educator?  · Do I need to meet with a dietitian?  · What  number can I call if I have questions?  · When are the best times to check my blood glucose?  Where to find more information:  · American Diabetes Association: diabetes.org  · Academy of Nutrition and Dietetics: www.eatright.org  · National [place] of Diabetes and Digestive and Kidney Diseases (NIH): www.niddk.nih.gov  Summary  · A healthy meal plan will help you control your blood glucose and maintain a healthy lifestyle.  · Working with a diet and nutrition specialist (dietitian) can help you make a meal plan that is best for you.  · Keep in mind that carbohydrates (carbs) and alcohol have immediate effects on your blood glucose levels. It is important to count carbs and to use alcohol carefully.  This information is not intended to replace advice given to you by your health care provider. Make sure you discuss any questions you have with your health care provider.  Document Released: 09/14/2006 Document Revised: 07/18/2018 Document Reviewed: 01/22/2018  [employer] Patient Education © 2019 [employer]        Mariela Hall, APRN

## 2020-01-24 LAB
25(OH)D3 SERPL-MCNC: 19.2 NG/ML (ref 30–100)
ALBUMIN SERPL-MCNC: 4.4 G/DL (ref 3.5–5.2)
ALBUMIN/GLOB SERPL: 1.6 G/DL
ALP SERPL-CCNC: 91 U/L (ref 39–117)
ALT SERPL W P-5'-P-CCNC: 23 U/L (ref 1–33)
ANION GAP SERPL CALCULATED.3IONS-SCNC: 14.9 MMOL/L (ref 5–15)
AST SERPL-CCNC: 30 U/L (ref 1–32)
BILIRUB SERPL-MCNC: 0.2 MG/DL (ref 0.2–1.2)
BUN BLD-MCNC: 7 MG/DL (ref 6–20)
BUN/CREAT SERPL: 8 (ref 7–25)
CALCIUM SPEC-SCNC: 9.3 MG/DL (ref 8.6–10.5)
CHLORIDE SERPL-SCNC: 106 MMOL/L (ref 98–107)
CHOLEST SERPL-MCNC: 199 MG/DL (ref 0–200)
CO2 SERPL-SCNC: 20.1 MMOL/L (ref 22–29)
CREAT BLD-MCNC: 0.87 MG/DL (ref 0.57–1)
GFR SERPL CREATININE-BSD FRML MDRD: 70 ML/MIN/1.73
GLOBULIN UR ELPH-MCNC: 2.7 GM/DL
GLUCOSE BLD-MCNC: 164 MG/DL (ref 65–99)
HDLC SERPL-MCNC: 46 MG/DL (ref 40–60)
LDLC SERPL CALC-MCNC: 82 MG/DL (ref 0–100)
LDLC/HDLC SERPL: 1.79 {RATIO}
POTASSIUM BLD-SCNC: 4.4 MMOL/L (ref 3.5–5.2)
PROT SERPL-MCNC: 7.1 G/DL (ref 6–8.5)
SODIUM BLD-SCNC: 141 MMOL/L (ref 136–145)
T4 SERPL-MCNC: 6.42 MCG/DL (ref 4.5–11.7)
TRIGL SERPL-MCNC: 353 MG/DL (ref 0–150)
TSH SERPL DL<=0.05 MIU/L-ACNC: 1.99 UIU/ML (ref 0.27–4.2)
VLDLC SERPL-MCNC: 70.6 MG/DL (ref 5–40)

## 2020-01-25 PROBLEM — R01.1 HEART MURMUR: Status: ACTIVE | Noted: 2020-01-25

## 2020-01-26 NOTE — PATIENT INSTRUCTIONS
Calorie Counting for Weight Loss  Calories are units of energy. Your body needs a certain amount of calories from food to keep you going throughout the day. When you eat more calories than your body needs, your body stores the extra calories as fat. When you eat fewer calories than your body needs, your body burns fat to get the energy it needs.  Calorie counting means keeping track of how many calories you eat and drink each day. Calorie counting can be helpful if you need to lose weight. If you make sure to eat fewer calories than your body needs, you should lose weight. Ask your health care provider what a healthy weight is for you.  For calorie counting to work, you will need to eat the right number of calories in a day in order to lose a healthy amount of weight per week. A dietitian can help you determine how many calories you need in a day and will give you suggestions on how to reach your calorie goal.  · A healthy amount of weight to lose per week is usually 1-2 lb (0.5-0.9 kg). This usually means that your daily calorie intake should be reduced by 500-750 calories.  · Eating 1,200 - 1,500 calories per day can help most women lose weight.  · Eating 1,500 - 1,800 calories per day can help most men lose weight.  What is my plan?  My goal is to have __________ calories per day.  If I have this many calories per day, I should lose around __________ pounds per week.  What do I need to know about calorie counting?  In order to meet your daily calorie goal, you will need to:  · Find out how many calories are in each food you would like to eat. Try to do this before you eat.  · Decide how much of the food you plan to eat.  · Write down what you ate and how many calories it had. Doing this is called keeping a food log.  To successfully lose weight, it is important to balance calorie counting with a healthy lifestyle that includes regular activity. Aim for 150 minutes of moderate exercise (such as walking) or 75  minutes of vigorous exercise (such as running) each week.  Where do I find calorie information?    The number of calories in a food can be found on a Nutrition Facts label. If a food does not have a Nutrition Facts label, try to look up the calories online or ask your dietitian for help.  Remember that calories are listed per serving. If you choose to have more than one serving of a food, you will have to multiply the calories per serving by the amount of servings you plan to eat. For example, the label on a package of bread might say that a serving size is 1 slice and that there are 90 calories in a serving. If you eat 1 slice, you will have eaten 90 calories. If you eat 2 slices, you will have eaten 180 calories.  How do I keep a food log?  Immediately after each meal, record the following information in your food log:  · What you ate. Don't forget to include toppings, sauces, and other extras on the food.  · How much you ate. This can be measured in cups, ounces, or number of items.  · How many calories each food and drink had.  · The total number of calories in the meal.  Keep your food log near you, such as in a small notebook in your pocket, or use a mobile patria or website. Some programs will calculate calories for you and show you how many calories you have left for the day to meet your goal.  What are some calorie counting tips?    · Use your calories on foods and drinks that will fill you up and not leave you hungry:  ? Some examples of foods that fill you up are nuts and nut butters, vegetables, lean proteins, and high-fiber foods like whole grains. High-fiber foods are foods with more than 5 g fiber per serving.  ? Drinks such as sodas, specialty coffee drinks, alcohol, and juices have a lot of calories, yet do not fill you up.  · Eat nutritious foods and avoid empty calories. Empty calories are calories you get from foods or beverages that do not have many vitamins or protein, such as candy, sweets, and  "soda. It is better to have a nutritious high-calorie food (such as an avocado) than a food with few nutrients (such as a bag of chips).  · Know how many calories are in the foods you eat most often. This will help you calculate calorie counts faster.  · Pay attention to calories in drinks. Low-calorie drinks include water and unsweetened drinks.  · Pay attention to nutrition labels for \"low fat\" or \"fat free\" foods. These foods sometimes have the same amount of calories or more calories than the full fat versions. They also often have added sugar, starch, or salt, to make up for flavor that was removed with the fat.  · Find a way of tracking calories that works for you. Get creative. Try different apps or programs if writing down calories does not work for you.  What are some portion control tips?  · Know how many calories are in a serving. This will help you know how many servings of a certain food you can have.  · Use a measuring cup to measure serving sizes. You could also try weighing out portions on a kitchen scale. With time, you will be able to estimate serving sizes for some foods.  · Take some time to put servings of different foods on your favorite plates, bowls, and cups so you know what a serving looks like.  · Try not to eat straight from a bag or box. Doing this can lead to overeating. Put the amount you would like to eat in a cup or on a plate to make sure you are eating the right portion.  · Use smaller plates, glasses, and bowls to prevent overeating.  · Try not to multitask (for example, watch TV or use your computer) while eating. If it is time to eat, sit down at a table and enjoy your food. This will help you to know when you are full. It will also help you to be aware of what you are eating and how much you are eating.  What are tips for following this plan?  Reading food labels  · Check the calorie count compared to the serving size. The serving size may be smaller than what you are used to " "eating.  · Check the source of the calories. Make sure the food you are eating is high in vitamins and protein and low in saturated and trans fats.  Shopping  · Read nutrition labels while you shop. This will help you make healthy decisions before you decide to purchase your food.  · Make a grocery list and stick to it.  Cooking  · Try to cook your favorite foods in a healthier way. For example, try baking instead of frying.  · Use low-fat dairy products.  Meal planning  · Use more fruits and vegetables. Half of your plate should be fruits and vegetables.  · Include lean proteins like poultry and fish.  How do I count calories when eating out?  · Ask for smaller portion sizes.  · Consider sharing an entree and sides instead of getting your own entree.  · If you get your own entree, eat only half. Ask for a box at the beginning of your meal and put the rest of your entree in it so you are not tempted to eat it.  · If calories are listed on the menu, choose the lower calorie options.  · Choose dishes that include vegetables, fruits, whole grains, low-fat dairy products, and lean protein.  · Choose items that are boiled, broiled, grilled, or steamed. Stay away from items that are buttered, battered, fried, or served with cream sauce. Items labeled \"crispy\" are usually fried, unless stated otherwise.  · Choose water, low-fat milk, unsweetened iced tea, or other drinks without added sugar. If you want an alcoholic beverage, choose a lower calorie option such as a glass of wine or light beer.  · Ask for dressings, sauces, and syrups on the side. These are usually high in calories, so you should limit the amount you eat.  · If you want a salad, choose a garden salad and ask for grilled meats. Avoid extra toppings like couch, cheese, or fried items. Ask for the dressing on the side, or ask for olive oil and vinegar or lemon to use as dressing.  · Estimate how many servings of a food you are given. For example, a serving of " cooked rice is ½ cup or about the size of half a baseball. Knowing serving sizes will help you be aware of how much food you are eating at restaurants. The list below tells you how big or small some common portion sizes are based on everyday objects:  ? 1 oz--4 stacked dice.  ? 3 oz--1 deck of cards.  ? 1 tsp--1 die.  ? 1 Tbsp--½ a ping-pong ball.  ? 2 Tbsp--1 ping-pong ball.  ? ½ cup--½ baseball.  ? 1 cup--1 baseball.  Summary  · Calorie counting means keeping track of how many calories you eat and drink each day. If you eat fewer calories than your body needs, you should lose weight.  · A healthy amount of weight to lose per week is usually 1-2 lb (0.5-0.9 kg). This usually means reducing your daily calorie intake by 500-750 calories.  · The number of calories in a food can be found on a Nutrition Facts label. If a food does not have a Nutrition Facts label, try to look up the calories online or ask your dietitian for help.  · Use your calories on foods and drinks that will fill you up, and not on foods and drinks that will leave you hungry.  · Use smaller plates, glasses, and bowls to prevent overeating.  This information is not intended to replace advice given to you by your health care provider. Make sure you discuss any questions you have with your health care provider.  Document Released: 12/18/2006 Document Revised: 09/06/2019 Document Reviewed: 11/17/2017  [employer] Interactive Patient Education © 2019 [employer]  Diabetes Mellitus and Nutrition, Adult  When you have diabetes (diabetes mellitus), it is very important to have healthy eating habits because your blood sugar (glucose) levels are greatly affected by what you eat and drink. Eating healthy foods in the appropriate amounts, at about the same times every day, can help you:  · Control your blood glucose.  · Lower your risk of heart disease.  · Improve your blood pressure.  · Reach or maintain a healthy weight.  Every person with diabetes is  different, and each person has different needs for a meal plan. Your health care provider may recommend that you work with a diet and nutrition specialist (dietitian) to make a meal plan that is best for you. Your meal plan may vary depending on factors such as:  · The calories you need.  · The medicines you take.  · Your weight.  · Your blood glucose, blood pressure, and cholesterol levels.  · Your activity level.  · Other health conditions you have, such as heart or kidney disease.  How do carbohydrates affect me?  Carbohydrates, also called carbs, affect your blood glucose level more than any other type of food. Eating carbs naturally raises the amount of glucose in your blood. Carb counting is a method for keeping track of how many carbs you eat. Counting carbs is important to keep your blood glucose at a healthy level, especially if you use insulin or take certain oral diabetes medicines.  It is important to know how many carbs you can safely have in each meal. This is different for every person. Your dietitian can help you calculate how many carbs you should have at each meal and for each snack.  Foods that contain carbs include:  · Bread, cereal, rice, pasta, and crackers.  · Potatoes and corn.  · Peas, beans, and lentils.  · Milk and yogurt.  · Fruit and juice.  · Desserts, such as cakes, cookies, ice cream, and candy.  How does alcohol affect me?  Alcohol can cause a sudden decrease in blood glucose (hypoglycemia), especially if you use insulin or take certain oral diabetes medicines. Hypoglycemia can be a life-threatening condition. Symptoms of hypoglycemia (sleepiness, dizziness, and confusion) are similar to symptoms of having too much alcohol.  If your health care provider says that alcohol is safe for you, follow these guidelines:  · Limit alcohol intake to no more than 1 drink per day for nonpregnant women and 2 drinks per day for men. One drink equals 12 oz of beer, 5 oz of wine, or 1½ oz of hard  "liquor.  · Do not drink on an empty stomach.  · Keep yourself hydrated with water, diet soda, or unsweetened iced tea.  · Keep in mind that regular soda, juice, and other mixers may contain a lot of sugar and must be counted as carbs.  What are tips for following this plan?    Reading food labels  · Start by checking the serving size on the \"Nutrition Facts\" label of packaged foods and drinks. The amount of calories, carbs, fats, and other nutrients listed on the label is based on one serving of the item. Many items contain more than one serving per package.  · Check the total grams (g) of carbs in one serving. You can calculate the number of servings of carbs in one serving by dividing the total carbs by 15. For example, if a food has 30 g of total carbs, it would be equal to 2 servings of carbs.  · Check the number of grams (g) of saturated and trans fats in one serving. Choose foods that have low or no amount of these fats.  · Check the number of milligrams (mg) of salt (sodium) in one serving. Most people should limit total sodium intake to less than 2,300 mg per day.  · Always check the nutrition information of foods labeled as \"low-fat\" or \"nonfat\". These foods may be higher in added sugar or refined carbs and should be avoided.  · Talk to your dietitian to identify your daily goals for nutrients listed on the label.  Shopping  · Avoid buying canned, premade, or processed foods. These foods tend to be high in fat, sodium, and added sugar.  · Shop around the outside edge of the grocery store. This includes fresh fruits and vegetables, bulk grains, fresh meats, and fresh dairy.  Cooking  · Use low-heat cooking methods, such as baking, instead of high-heat cooking methods like deep frying.  · Cook using healthy oils, such as olive, canola, or sunflower oil.  · Avoid cooking with butter, cream, or high-fat meats.  Meal planning  · Eat meals and snacks regularly, preferably at the same times every day. Avoid going " long periods of time without eating.  · Eat foods high in fiber, such as fresh fruits, vegetables, beans, and whole grains. Talk to your dietitian about how many servings of carbs you can eat at each meal.  · Eat 4-6 ounces (oz) of lean protein each day, such as lean meat, chicken, fish, eggs, or tofu. One oz of lean protein is equal to:  ? 1 oz of meat, chicken, or fish.  ? 1 egg.  ? ¼ cup of tofu.  · Eat some foods each day that contain healthy fats, such as avocado, nuts, seeds, and fish.  Lifestyle  · Check your blood glucose regularly.  · Exercise regularly as told by your health care provider. This may include:  ? 150 minutes of moderate-intensity or vigorous-intensity exercise each week. This could be brisk walking, biking, or water aerobics.  ? Stretching and doing strength exercises, such as yoga or weightlifting, at least 2 times a week.  · Take medicines as told by your health care provider.  · Do not use any products that contain nicotine or tobacco, such as cigarettes and e-cigarettes. If you need help quitting, ask your health care provider.  · Work with a counselor or diabetes educator to identify strategies to manage stress and any emotional and social challenges.  Questions to ask a health care provider  · Do I need to meet with a diabetes educator?  · Do I need to meet with a dietitian?  · What number can I call if I have questions?  · When are the best times to check my blood glucose?  Where to find more information:  · American Diabetes Association: diabetes.org  · Academy of Nutrition and Dietetics: www.eatright.org  · National [place] of Diabetes and Digestive and Kidney Diseases (NIH): www.niddk.nih.gov  Summary  · A healthy meal plan will help you control your blood glucose and maintain a healthy lifestyle.  · Working with a diet and nutrition specialist (dietitian) can help you make a meal plan that is best for you.  · Keep in mind that carbohydrates (carbs) and alcohol have immediate  effects on your blood glucose levels. It is important to count carbs and to use alcohol carefully.  This information is not intended to replace advice given to you by your health care provider. Make sure you discuss any questions you have with your health care provider.  Document Released: 09/14/2006 Document Revised: 07/18/2018 Document Reviewed: 01/22/2018  [employer] Interactive Patient Education © 2019 [employer]

## 2020-02-12 ENCOUNTER — TELEPHONE (OUTPATIENT)
Dept: FAMILY MEDICINE CLINIC | Facility: CLINIC | Age: 50
End: 2020-02-12

## 2020-02-12 DIAGNOSIS — E11.9 TYPE 2 DIABETES MELLITUS WITHOUT COMPLICATION, WITHOUT LONG-TERM CURRENT USE OF INSULIN: ICD-10-CM

## 2020-02-12 NOTE — TELEPHONE ENCOUNTER
Patient is requesting the following medication refill:  Dulaglutide (TRULICITY) 1.5 MG/0.5ML solution pen-injector    Please advise    Patient call back [phone]    [facility] [address]

## 2020-02-22 ENCOUNTER — TELEPHONE (OUTPATIENT)
Dept: FAMILY MEDICINE CLINIC | Facility: CLINIC | Age: 50
End: 2020-02-22

## 2020-02-22 DIAGNOSIS — E11.9 TYPE 2 DIABETES MELLITUS WITHOUT COMPLICATION, WITHOUT LONG-TERM CURRENT USE OF INSULIN (HCC): ICD-10-CM

## 2020-02-22 NOTE — TELEPHONE ENCOUNTER
Duplicate: Refill has been sent.    ----- Message from Mindy Boswell sent at 2/21/2020  6:03 PM EST -----  Regarding: PLEASE CALL  Contact: [phone]  PT IS IN [place] AND HAS LEFT TRULICITY PEN HERE. CAN SHE GET SOME SENT TO [facility] [address]

## 2020-02-22 NOTE — TELEPHONE ENCOUNTER
Refill has been sent.  ----- Message from Luisana Morrison sent at 2/22/2020 10:11 AM EST -----  Contact: [phone]  This is an urgent request the patient got her trulicity refilled on 2/12 but she is currently in [place] and has lost the entire prescription and needs an emergency supply sent up to the pharmacy in [facility] [phone] this is the rx below       Dulaglutide (TRULICITY) 1.5 MG/0.5ML solution pen-injectorSig: Inject 1.5 mg under the skin into the appropriate area as directed 1 (One) Time Per Week.       Sent to pharmacy as: Dulaglutide 1.5 MG/0.5ML Subcutaneous Solution Pen-injector (TRULICITY)       Class: Normal       Route: Subcutaneous       E-Prescribing Status: Receipt confirmed by pharmacy (2/12/2020  3:21 PM EST)

## 2020-03-14 DIAGNOSIS — E11.9 TYPE 2 DIABETES MELLITUS WITHOUT COMPLICATION, WITHOUT LONG-TERM CURRENT USE OF INSULIN (HCC): ICD-10-CM

## 2020-05-21 DIAGNOSIS — E11.9 TYPE 2 DIABETES MELLITUS WITHOUT COMPLICATION, WITHOUT LONG-TERM CURRENT USE OF INSULIN (HCC): ICD-10-CM

## 2020-05-21 RX ORDER — DULAGLUTIDE 1.5 MG/.5ML
INJECTION, SOLUTION SUBCUTANEOUS
Qty: 4 ML | Refills: 0 | Status: SHIPPED | OUTPATIENT
Start: 2020-05-21 | End: 2020-07-07 | Stop reason: ALTCHOICE

## 2020-07-07 ENCOUNTER — OFFICE VISIT (OUTPATIENT)
Dept: ENDOCRINOLOGY | Facility: CLINIC | Age: 50
End: 2020-07-07

## 2020-07-07 VITALS
WEIGHT: 222.2 LBS | BODY MASS INDEX: 39.37 KG/M2 | DIASTOLIC BLOOD PRESSURE: 82 MMHG | HEART RATE: 67 BPM | HEIGHT: 63 IN | OXYGEN SATURATION: 99 % | SYSTOLIC BLOOD PRESSURE: 122 MMHG | RESPIRATION RATE: 18 BRPM

## 2020-07-07 DIAGNOSIS — E66.01 CLASS 2 SEVERE OBESITY DUE TO EXCESS CALORIES WITH SERIOUS COMORBIDITY AND BODY MASS INDEX (BMI) OF 39.0 TO 39.9 IN ADULT (HCC): ICD-10-CM

## 2020-07-07 DIAGNOSIS — E11.65 TYPE 2 DIABETES MELLITUS WITH HYPERGLYCEMIA, WITHOUT LONG-TERM CURRENT USE OF INSULIN (HCC): Primary | ICD-10-CM

## 2020-07-07 DIAGNOSIS — E78.2 MIXED HYPERLIPIDEMIA: ICD-10-CM

## 2020-07-07 PROBLEM — G43.909 MIGRAINE: Status: ACTIVE | Noted: 2020-07-07

## 2020-07-07 PROBLEM — I10 ESSENTIAL HYPERTENSION: Status: ACTIVE | Noted: 2020-07-07

## 2020-07-07 PROBLEM — F32.A DEPRESSED: Status: ACTIVE | Noted: 2020-07-07

## 2020-07-07 PROBLEM — E66.812 CLASS 2 SEVERE OBESITY DUE TO EXCESS CALORIES WITH SERIOUS COMORBIDITY AND BODY MASS INDEX (BMI) OF 39.0 TO 39.9 IN ADULT: Status: ACTIVE | Noted: 2020-07-07

## 2020-07-07 PROBLEM — F31.9 BIPOLAR 1 DISORDER (HCC): Status: ACTIVE | Noted: 2020-07-07

## 2020-07-07 LAB
EXPIRATION DATE: ABNORMAL
EXPIRATION DATE: NORMAL
GLUCOSE BLDC GLUCOMTR-MCNC: 246 MG/DL (ref 70–130)
HBA1C MFR BLD: 9.6 %
Lab: ABNORMAL
Lab: NORMAL

## 2020-07-07 PROCEDURE — 83036 HEMOGLOBIN GLYCOSYLATED A1C: CPT | Performed by: INTERNAL MEDICINE

## 2020-07-07 PROCEDURE — 99244 OFF/OP CNSLTJ NEW/EST MOD 40: CPT | Performed by: INTERNAL MEDICINE

## 2020-07-07 PROCEDURE — 82947 ASSAY GLUCOSE BLOOD QUANT: CPT | Performed by: INTERNAL MEDICINE

## 2020-07-07 NOTE — PROGRESS NOTES
Chief Complaint   Patient presents with   • Diabetes     New patient    • Med Refill     Trulicity, Glimpride         Referring Provider  Mariela Hall APRN HPI   Patricia Brown is a 50 y.o. female had concerns including Diabetes (New patient ) and Med Refill (Trulicity, Glimpride ).    Diabetes was diagnosed 27 years ago with GDM. Required insulin during that pregnancy.   Complications include: none known.  Last ophtho exam was done recently at [facility] but reports this wasn't dilated.  Current medications: none. In the past was on glimepiride and trulicity 1.5 weekly.   In the past has been on humalog, glucophage/metformin.   She checks her blood sugar 1-2 times per day at most but doesn't check every day. BGs are in the high 200s most all of the time.   Her BG is 246 today though she hasn't eaten today (other than a few tums 5 hrs ago).     She has GI intolerance to metformin.     She moved here about a year ago and DM was previously controlled. She then ran out of her medication and A1c was >12 from January 2020. Today A1c is improved to 9.6.    Diet: not good  Breakfast: waffles with regular syrup (small amount), eggs with couch/sausage  Lunch: may skip  Dinner: largest meal - pastas, meatloaf with sides (pasta - small portions), salad  Snacks: fruit  Drinks: powerade zero rarely, water    Plans to start exercising/walking more.     Is able to lose weight but has a hard time maintaining.     Past Medical History:   Diagnosis Date   • Bipolar 1 disorder (CMS/[facility])    • Cancer (CMS/[facility])     hx cervical cancer -    • Diabetes mellitus (CMS/[facility])    • Fibrocystic breast    • Heart murmur    • Hyperlipidemia    • Hypertension    • Obesity    • Visual impairment     glasses      Past Surgical History:   Procedure Laterality Date   • CERVIX SURGERY      cervical scraping for cancer      Family History   Problem Relation Age of Onset   • Diabetes Mother    • Heart disease Mother    • Obesity Mother    •  Diabetes Father       Social History     Socioeconomic History   • Marital status:      Spouse name: Not on file   • Number of children: Not on file   • Years of education: Not on file   • Highest education level: Not on file   Tobacco Use   • Smoking status: Former Smoker     Last attempt to quit: 2005     Years since quitting: 15.5   Substance and Sexual Activity   • Alcohol use: Not Currently   • Drug use: Never   • Sexual activity: Yes     Partners: Male   Social History Narrative         Children 2 - ages 25, 23      No Known Allergies   Current Outpatient Medications on File Prior to Visit   Medication Sig Dispense Refill   • buPROPion XL (WELLBUTRIN XL) 150 MG 24 hr tablet Take 150 mg by mouth Daily.     • Cariprazine HCl (VRAYLAR) 4.5 MG capsule Take  by mouth Daily.     • clonazePAM (KlonoPIN) 0.5 MG tablet Take 0.5 mg by mouth 3 (Three) Times a Day As Needed for Seizures.     • enalapril (VASOTEC) 5 MG tablet Take 1 tablet by mouth Daily. 30 tablet 5   • glucose blood test strip 1 each by Other route 3 (Three) Times a Day As Needed (three times a day and prn). Use as instructed 120 each 12   • Lancets (FREESTYLE) lancets As previously directed 60 each 0   • lithium carbonate 300 MG capsule Take 300 mg by mouth 2 (Two) Times a Day With Meals. 2 in am, 3 at night      • mupirocin (BACTROBAN) 2 % ointment Apply  topically to the appropriate area as directed 3 (Three) Times a Day. 1 each 0   • pravastatin (PRAVACHOL) 40 MG tablet Take 1 tablet by mouth Daily. 30 tablet 2   • topiramate (TOPAMAX) 100 MG tablet Take 100 mg by mouth 2 (Two) Times a Day.     • [DISCONTINUED] glimepiride (AMARYL) 2 MG tablet Take 1 tablet by mouth Every Morning Before Breakfast. 60 tablet 2   • [DISCONTINUED] TRULICITY 1.5 MG/0.5ML solution pen-injector INJECT THE CONTENTS OF 1  PEN SUBCUTANEOUSLY INTO THE APPROPRIATE AREA WEEKLY AS  DIRECTED 4 mL 0     No current facility-administered medications on file prior to  "visit.         Review of Systems   Constitutional: Positive for fatigue and unexpected weight gain.   HENT: Negative.    Eyes: Negative.    Respiratory: Negative.    Cardiovascular: Negative.    Gastrointestinal: Positive for diarrhea (chronic).   Endocrine: Positive for polydipsia and polyuria.        Hot flashes   Genitourinary: Negative.    Musculoskeletal: Negative.    Skin: Negative.    Allergic/Immunologic: Negative.    Neurological: Negative.    Hematological: Negative.    Psychiatric/Behavioral: Negative.       ROS was reviewed and verified. All other ROS negative.    /82   Pulse 67   Resp 18   Ht 160 cm (62.99\")   Wt 101 kg (222 lb 3.2 oz)   SpO2 99%   BMI 39.37 kg/m²      Physical Exam   Constitutional: She appears well-developed.    Patricia had a diabetic foot exam performed today.   During the foot exam she had a monofilament test performed (5/5 bilaterally).  Vascular Status -  Her right foot exhibits normal foot vasculature  and no edema. Her left foot exhibits normal foot vasculature  and no edema.  Skin Integrity  -  Her right foot skin is intact. She has callous right foot.Her left foot skin is intact.She has callous left foot..    Constitutional:  well developed; well nourished  no acute distress  obese - Body mass index is 39.37 kg/m².   ENT/Thyroid: no thyromegaly  no palpable nodules   Eyes: EOM intact  Conjunctiva: clear   Respiratory:  breathing is unlabored  clear to auscultation bilaterally   Cardiovascular:  regular rate and rhythm, S1, S2 normal, no murmur, click, rub or gallop   Chest:  Not performed.   Abdomen: Not performed.   : Not performed.   Musculoskeletal: negative findings:  ROM of all joints is normal, no deformities present   Skin: dry and warm   Neuro: normal without focal findings and mental status, speech normal, alert and oriented x3   Psych: oriented to time, place and person, mood and affect are within normal limits     CMP:  Lab Results   Component Value Date "    BUN 7 01/23/2020    CREATININE 0.87 01/23/2020    EGFRIFNONA 70 01/23/2020    BCR 8.0 01/23/2020     01/23/2020    K 4.4 01/23/2020    CO2 20.1 (L) 01/23/2020    CALCIUM 9.3 01/23/2020    ALBUMIN 4.40 01/23/2020    BILITOT 0.2 01/23/2020    ALKPHOS 91 01/23/2020    AST 30 01/23/2020    ALT 23 01/23/2020     Lipid Panel:  Lab Results   Component Value Date    CHOL 199 01/23/2020    TRIG 353 (H) 01/23/2020    HDL 46 01/23/2020    VLDL 70.6 (H) 01/23/2020    LDL 82 01/23/2020     HbA1c:  Lab Results   Component Value Date    HGBA1C 9.6 07/07/2020    HGBA1C 12.20 (H) 01/08/2020     Glucose:  Lab Results   Component Value Date    POCGLU 246 (A) 07/07/2020     TSH:  Lab Results   Component Value Date    TSH 1.990 01/23/2020       Assessment and Plan    Patricia was seen today for diabetes and med refill.    Diagnoses and all orders for this visit:    Type 2 diabetes mellitus with hyperglycemia, without long-term current use of insulin (CMS/[facility])  Uncontrolled with A1c 9.6 but improved from last in January at 12.2.  Currently she is on no diabetic medications.  Resume GLP-1 agonist but transition to Ozempic 0.25 mg weekly and increase to 0.5 mg after 1 month.  Will titrate up to max dose of 1 mg weekly if tolerated and needed.  Will not resume glimepiride at this time due to side effect of weight gain and low blood sugars.  I am hopeful with more glucose monitoring (at least twice daily), caution with her diet and resuming a GLP-1 agonist, we may attain reasonable glucose control.  If glucose levels are not at goal despite this, consider addition of SGLT2 inhibitor once BG's are improved.  Labs are up-to-date from PCPs office in January.  Monofilament was updated today.  Ophtho-exam is up-to-date within the last year but she is due for dilated eye exam as this should be done yearly.  Check urine microalbumin at follow-up visit.  -     POC Glycosylated Hemoglobin (Hb A1C)  -     POC Glucose  -     Semaglutide,0.25 or  0.5MG/DOS, (Ozempic, 0.25 or 0.5 MG/DOSE,) 2 MG/1.5ML solution pen-injector; Inject 0.25 mg under the skin into the appropriate area as directed 1 (One) Time Per Week. Increase to 0.5 mg after 4 weeks    Class 2 severe obesity due to excess calories with serious comorbidity and body mass index (BMI) of 39.0 to 39.9 in adult (CMS/[facility])  BMI 39.4.  Weight loss is necessary for overall health and diabetes control.  I am hopeful the addition of GLP-1 agonist as above will aid in weight loss.  Decrease carb intake.  Increase exercise activity.     Mixed hyperlipidemia  Last lipid panel showed triglycerides significantly elevated at 353 with an LDL of 82.  High triglycerides are due to uncontrolled diabetes and should improve as glucose levels improved.  Monitor yearly.  Continue pravastatin for now but depending on recheck level in January, consider transition to moderate or high intensity statin due to diabetes.    Return in about 2 months (around 9/7/2020) for next scheduled follow up. The patient was instructed to contact the clinic with any interval questions or concerns.    Callie Ramirez, DO   Endocrinologist    Please note that portions of this document were completed with a voice recognition program. Efforts were made to edit the dictations, but occasionally words are mis-transcribed.

## 2020-07-08 DIAGNOSIS — I10 HYPERTENSION, UNSPECIFIED TYPE: ICD-10-CM

## 2020-07-08 RX ORDER — ENALAPRIL MALEATE 5 MG/1
5 TABLET ORAL DAILY
Qty: 30 TABLET | Refills: 0 | Status: SHIPPED | OUTPATIENT
Start: 2020-07-08 | End: 2020-07-14 | Stop reason: SDUPTHER

## 2020-07-14 ENCOUNTER — OFFICE VISIT (OUTPATIENT)
Dept: FAMILY MEDICINE CLINIC | Facility: CLINIC | Age: 50
End: 2020-07-14

## 2020-07-14 VITALS
HEART RATE: 75 BPM | BODY MASS INDEX: 38.45 KG/M2 | SYSTOLIC BLOOD PRESSURE: 122 MMHG | OXYGEN SATURATION: 99 % | DIASTOLIC BLOOD PRESSURE: 82 MMHG | HEIGHT: 63 IN | WEIGHT: 217 LBS | RESPIRATION RATE: 20 BRPM | TEMPERATURE: 97.8 F

## 2020-07-14 DIAGNOSIS — R01.1 HEART MURMUR: ICD-10-CM

## 2020-07-14 DIAGNOSIS — I10 HYPERTENSION, UNSPECIFIED TYPE: ICD-10-CM

## 2020-07-14 DIAGNOSIS — E11.9 TYPE 2 DIABETES MELLITUS WITHOUT COMPLICATION, WITHOUT LONG-TERM CURRENT USE OF INSULIN (HCC): ICD-10-CM

## 2020-07-14 DIAGNOSIS — H61.92 SKIN LESION OF LEFT EAR: ICD-10-CM

## 2020-07-14 DIAGNOSIS — L23.9 ECZEMA, ALLERGIC: ICD-10-CM

## 2020-07-14 DIAGNOSIS — E78.2 MIXED HYPERLIPIDEMIA: Primary | ICD-10-CM

## 2020-07-14 DIAGNOSIS — E66.01 CLASS 2 SEVERE OBESITY DUE TO EXCESS CALORIES WITH SERIOUS COMORBIDITY AND BODY MASS INDEX (BMI) OF 39.0 TO 39.9 IN ADULT (HCC): ICD-10-CM

## 2020-07-14 DIAGNOSIS — R79.89 LOW VITAMIN D LEVEL: ICD-10-CM

## 2020-07-14 LAB
ALBUMIN SERPL-MCNC: 4.4 G/DL (ref 3.5–5.2)
ALBUMIN/GLOB SERPL: 1.5 G/DL
ALP SERPL-CCNC: 85 U/L (ref 39–117)
ALT SERPL W P-5'-P-CCNC: 26 U/L (ref 1–33)
ANION GAP SERPL CALCULATED.3IONS-SCNC: 10.9 MMOL/L (ref 5–15)
AST SERPL-CCNC: 39 U/L (ref 1–32)
BASOPHILS # BLD AUTO: 0.05 10*3/MM3 (ref 0–0.2)
BASOPHILS NFR BLD AUTO: 0.8 % (ref 0–1.5)
BILIRUB BLD-MCNC: NEGATIVE MG/DL
BILIRUB SERPL-MCNC: 0.2 MG/DL (ref 0–1.2)
BUN SERPL-MCNC: 7 MG/DL (ref 6–20)
BUN/CREAT SERPL: 7.6 (ref 7–25)
CALCIUM SPEC-SCNC: 9.3 MG/DL (ref 8.6–10.5)
CHLORIDE SERPL-SCNC: 106 MMOL/L (ref 98–107)
CHOLEST SERPL-MCNC: 213 MG/DL (ref 0–200)
CLARITY, POC: CLEAR
CO2 SERPL-SCNC: 23.1 MMOL/L (ref 22–29)
COLOR UR: YELLOW
CREAT SERPL-MCNC: 0.92 MG/DL (ref 0.57–1)
DEPRECATED RDW RBC AUTO: 42.4 FL (ref 37–54)
EOSINOPHIL # BLD AUTO: 0.32 10*3/MM3 (ref 0–0.4)
EOSINOPHIL NFR BLD AUTO: 5 % (ref 0.3–6.2)
ERYTHROCYTE [DISTWIDTH] IN BLOOD BY AUTOMATED COUNT: 13.1 % (ref 12.3–15.4)
GFR SERPL CREATININE-BSD FRML MDRD: 65 ML/MIN/1.73
GLOBULIN UR ELPH-MCNC: 3 GM/DL
GLUCOSE SERPL-MCNC: 273 MG/DL (ref 65–99)
GLUCOSE UR STRIP-MCNC: NEGATIVE MG/DL
HCT VFR BLD AUTO: 46 % (ref 34–46.6)
HDLC SERPL-MCNC: 53 MG/DL (ref 40–60)
HGB BLD-MCNC: 14.4 G/DL (ref 12–15.9)
KETONES UR QL: NEGATIVE
LDLC SERPL CALC-MCNC: 96 MG/DL (ref 0–100)
LDLC/HDLC SERPL: 1.82 {RATIO}
LEUKOCYTE EST, POC: NEGATIVE
LYMPHOCYTES # BLD AUTO: 1.29 10*3/MM3 (ref 0.7–3.1)
LYMPHOCYTES NFR BLD AUTO: 20 % (ref 19.6–45.3)
MCH RBC QN AUTO: 27.6 PG (ref 26.6–33)
MCHC RBC AUTO-ENTMCNC: 31.3 G/DL (ref 31.5–35.7)
MCV RBC AUTO: 88.3 FL (ref 79–97)
MONOCYTES # BLD AUTO: 0.5 10*3/MM3 (ref 0.1–0.9)
MONOCYTES NFR BLD AUTO: 7.7 % (ref 5–12)
NEUTROPHILS NFR BLD AUTO: 4.29 10*3/MM3 (ref 1.7–7)
NEUTROPHILS NFR BLD AUTO: 66.3 % (ref 42.7–76)
NITRITE UR-MCNC: NEGATIVE MG/ML
PH UR: 7 [PH] (ref 5–8)
PLATELET # BLD AUTO: 172 10*3/MM3 (ref 140–450)
PMV BLD AUTO: 13.7 FL (ref 6–12)
POTASSIUM SERPL-SCNC: 5.3 MMOL/L (ref 3.5–5.2)
PROT SERPL-MCNC: 7.4 G/DL (ref 6–8.5)
PROT UR STRIP-MCNC: NEGATIVE MG/DL
RBC # BLD AUTO: 5.21 10*6/MM3 (ref 3.77–5.28)
RBC # UR STRIP: NEGATIVE /UL
SODIUM SERPL-SCNC: 140 MMOL/L (ref 136–145)
SP GR UR: 1.02 (ref 1–1.03)
TRIGL SERPL-MCNC: 319 MG/DL (ref 0–150)
TSH SERPL DL<=0.05 MIU/L-ACNC: 1.29 UIU/ML (ref 0.27–4.2)
UROBILINOGEN UR QL: NORMAL
VLDLC SERPL-MCNC: 63.8 MG/DL (ref 5–40)
WBC # BLD AUTO: 6.46 10*3/MM3 (ref 3.4–10.8)

## 2020-07-14 PROCEDURE — 81003 URINALYSIS AUTO W/O SCOPE: CPT | Performed by: NURSE PRACTITIONER

## 2020-07-14 PROCEDURE — 84443 ASSAY THYROID STIM HORMONE: CPT | Performed by: NURSE PRACTITIONER

## 2020-07-14 PROCEDURE — 80061 LIPID PANEL: CPT | Performed by: NURSE PRACTITIONER

## 2020-07-14 PROCEDURE — 85025 COMPLETE CBC W/AUTO DIFF WBC: CPT | Performed by: NURSE PRACTITIONER

## 2020-07-14 PROCEDURE — 99214 OFFICE O/P EST MOD 30 MIN: CPT | Performed by: NURSE PRACTITIONER

## 2020-07-14 PROCEDURE — 80053 COMPREHEN METABOLIC PANEL: CPT | Performed by: NURSE PRACTITIONER

## 2020-07-14 PROCEDURE — 82306 VITAMIN D 25 HYDROXY: CPT | Performed by: NURSE PRACTITIONER

## 2020-07-14 RX ORDER — PRAVASTATIN SODIUM 40 MG
40 TABLET ORAL DAILY
Qty: 90 TABLET | Refills: 1 | Status: SHIPPED | OUTPATIENT
Start: 2020-07-14 | End: 2021-01-11 | Stop reason: SDUPTHER

## 2020-07-14 RX ORDER — TRIAMCINOLONE ACETONIDE 0.25 MG/G
OINTMENT TOPICAL 2 TIMES DAILY
Qty: 1 TUBE | Refills: 0 | Status: SHIPPED | OUTPATIENT
Start: 2020-07-14 | End: 2021-01-26

## 2020-07-14 RX ORDER — ENALAPRIL MALEATE 5 MG/1
5 TABLET ORAL DAILY
Qty: 90 TABLET | Refills: 1 | Status: SHIPPED | OUTPATIENT
Start: 2020-07-14 | End: 2021-01-11 | Stop reason: SDUPTHER

## 2020-07-14 NOTE — PATIENT INSTRUCTIONS
"High Cholesterol    High cholesterol is a condition in which the blood has high levels of a white, waxy, fat-like substance (cholesterol). The human body needs small amounts of cholesterol. The liver makes all the cholesterol that the body needs. Extra (excess) cholesterol comes from the food that we eat.  Cholesterol is carried from the liver by the blood through the blood vessels. If you have high cholesterol, deposits (plaques) may build up on the walls of your blood vessels (arteries). Plaques make the arteries narrower and stiffer. Cholesterol plaques increase your risk for heart attack and stroke. Work with your health care provider to keep your cholesterol levels in a healthy range.  What increases the risk?  This condition is more likely to develop in people who:  · Eat foods that are high in animal fat (saturated fat) or cholesterol.  · Are overweight.  · Are not getting enough exercise.  · Have a family history of high cholesterol.  What are the signs or symptoms?  There are no symptoms of this condition.  How is this diagnosed?  This condition may be diagnosed from the results of a blood test.  · If you are older than age 20, your health care provider may check your cholesterol every 4-6 years.  · You may be checked more often if you already have high cholesterol or other risk factors for heart disease.  The blood test for cholesterol measures:  · \"Bad\" cholesterol (LDL cholesterol). This is the main type of cholesterol that causes heart disease. The desired level for LDL is less than 100.  · \"Good\" cholesterol (HDL cholesterol). This type helps to protect against heart disease by cleaning the arteries and carrying the LDL away. The desired level for HDL is 60 or higher.  · Triglycerides. These are fats that the body can store or burn for energy. The desired number for triglycerides is lower than 150.  · Total cholesterol. This is a measure of the total amount of cholesterol in your blood, including LDL " cholesterol, HDL cholesterol, and triglycerides. A healthy number is less than 200.  How is this treated?  This condition is treated with diet changes, lifestyle changes, and medicines.  Diet changes  · This may include eating more whole grains, fruits, vegetables, nuts, and fish.  · This may also include cutting back on red meat and foods that have a lot of added sugar.  Lifestyle changes  · Changes may include getting at least 40 minutes of aerobic exercise 3 times a week. Aerobic exercises include walking, biking, and swimming. Aerobic exercise along with a healthy diet can help you maintain a healthy weight.  · Changes may also include quitting smoking.  Medicines  · Medicines are usually given if diet and lifestyle changes have failed to reduce your cholesterol to healthy levels.  · Your health care provider may prescribe a statin medicine. Statin medicines have been shown to reduce cholesterol, which can reduce the risk of heart disease.  Follow these instructions at home:  Eating and drinking  If told by your health care provider:  · Eat chicken (without skin), fish, veal, shellfish, ground turkey breast, and round or loin cuts of red meat.  · Do not eat fried foods or fatty meats, such as hot dogs and salami.  · Eat plenty of fruits, such as apples.  · Eat plenty of vegetables, such as broccoli, potatoes, and carrots.  · Eat beans, peas, and lentils.  · Eat grains such as barley, rice, couscous, and bulgur wheat.  · Eat pasta without cream sauces.  · Use skim or nonfat milk, and eat low-fat or nonfat yogurt and cheeses.  · Do not eat or drink whole milk, cream, ice cream, egg yolks, or hard cheeses.  · Do not eat stick margarine or tub margarines that contain trans fats (also called partially hydrogenated oils).  · Do not eat saturated tropical oils, such as coconut oil and palm oil.  · Do not eat cakes, cookies, crackers, or other baked goods that contain trans fats.    General instructions  · Exercise as  directed by your health care provider. Increase your activity level with activities such as gardening, walking, and taking the stairs.  · Take over-the-counter and prescription medicines only as told by your health care provider.  · Do not use any products that contain nicotine or tobacco, such as cigarettes and e-cigarettes. If you need help quitting, ask your health care provider.  · Keep all follow-up visits as told by your health care provider. This is important.  Contact a health care provider if:  · You are struggling to maintain a healthy diet or weight.  · You need help to start on an exercise program.  · You need help to stop smoking.  Get help right away if:  · You have chest pain.  · You have trouble breathing.  This information is not intended to replace advice given to you by your health care provider. Make sure you discuss any questions you have with your health care provider.  Document Released: 12/18/2006 Document Revised: 12/21/2018 Document Reviewed: 06/17/2017  [employer] Patient Education © 2020 [employer]  Hypertension, Adult  Hypertension is another name for high blood pressure. High blood pressure forces your heart to work harder to pump blood. This can cause problems over time.  There are two numbers in a blood pressure reading. There is a top number (systolic) over a bottom number (diastolic). It is best to have a blood pressure that is below 120/80. Healthy choices can help lower your blood pressure, or you may need medicine to help lower it.  What are the causes?  The cause of this condition is not known. Some conditions may be related to high blood pressure.  What increases the risk?  · Smoking.  · Having type 2 diabetes mellitus, high cholesterol, or both.  · Not getting enough exercise or physical activity.  · Being overweight.  · Having too much fat, sugar, calories, or salt (sodium) in your diet.  · Drinking too much alcohol.  · Having long-term (chronic) kidney disease.  · Having a  family history of high blood pressure.  · Age. Risk increases with age.  · Race. You may be at higher risk if you are .  · Gender. Men are at higher risk than women before age 45. After age 65, women are at higher risk than men.  · Having obstructive sleep apnea.  · Stress.  What are the signs or symptoms?  · High blood pressure may not cause symptoms. Very high blood pressure (hypertensive crisis) may cause:  ? Headache.  ? Feelings of worry or nervousness (anxiety).  ? Shortness of breath.  ? Nosebleed.  ? A feeling of being sick to your stomach (nausea).  ? Throwing up (vomiting).  ? Changes in how you see.  ? Very bad chest pain.  ? Seizures.  How is this treated?  · This condition is treated by making healthy lifestyle changes, such as:  ? Eating healthy foods.  ? Exercising more.  ? Drinking less alcohol.  · Your health care provider may prescribe medicine if lifestyle changes are not enough to get your blood pressure under control, and if:  ? Your top number is above 130.  ? Your bottom number is above 80.  · Your personal target blood pressure may vary.  Follow these instructions at home:  Eating and drinking    · If told, follow the DASH eating plan. To follow this plan:  ? Fill one half of your plate at each meal with fruits and vegetables.  ? Fill one fourth of your plate at each meal with whole grains. Whole grains include whole-wheat pasta, brown rice, and whole-grain bread.  ? Eat or drink low-fat dairy products, such as skim milk or low-fat yogurt.  ? Fill one fourth of your plate at each meal with low-fat (lean) proteins. Low-fat proteins include fish, chicken without skin, eggs, beans, and tofu.  ? Avoid fatty meat, cured and processed meat, or chicken with skin.  ? Avoid pre-made or processed food.  · Eat less than 1,500 mg of salt each day.  · Do not drink alcohol if:  ? Your doctor tells you not to drink.  ? You are pregnant, may be pregnant, or are planning to become  pregnant.  · If you drink alcohol:  ? Limit how much you use to:  § 0-1 drink a day for women.  § 0-2 drinks a day for men.  ? Be aware of how much alcohol is in your drink. In the [place] one drink equals one 12 oz bottle of beer (355 mL), one 5 oz glass of wine (148 mL), or one 1½ oz glass of hard liquor (44 mL).  Lifestyle    · Work with your doctor to stay at a healthy weight or to lose weight. Ask your doctor what the best weight is for you.  · Get at least 30 minutes of exercise most days of the week. This may include walking, swimming, or biking.  · Get at least 30 minutes of exercise that strengthens your muscles (resistance exercise) at least 3 days a week. This may include lifting weights or doing Pilates.  · Do not use any products that contain nicotine or tobacco, such as cigarettes, e-cigarettes, and chewing tobacco. If you need help quitting, ask your doctor.  · Check your blood pressure at home as told by your doctor.  · Keep all follow-up visits as told by your doctor. This is important.  Medicines  · Take over-the-counter and prescription medicines only as told by your doctor. Follow directions carefully.  · Do not skip doses of blood pressure medicine. The medicine does not work as well if you skip doses. Skipping doses also puts you at risk for problems.  · Ask your doctor about side effects or reactions to medicines that you should watch for.  Contact a doctor if you:  · Think you are having a reaction to the medicine you are taking.  · Have headaches that keep coming back (recurring).  · Feel dizzy.  · Have swelling in your ankles.  · Have trouble with your vision.  Get help right away if you:  · Get a very bad headache.  · Start to feel mixed up (confused).  · Feel weak or numb.  · Feel faint.  · Have very bad pain in your:  ? Chest.  ? Belly (abdomen).  · Throw up more than once.  · Have trouble breathing.  Summary  · Hypertension is another name for high blood pressure.  · High blood pressure  forces your heart to work harder to pump blood.  · For most people, a normal blood pressure is less than 120/80.  · Making healthy choices can help lower blood pressure. If your blood pressure does not get lower with healthy choices, you may need to take medicine.  This information is not intended to replace advice given to you by your health care provider. Make sure you discuss any questions you have with your health care provider.  Document Released: 06/05/2009 Document Revised: 08/28/2019 Document Reviewed: 08/28/2019  [employer] Patient Education © 2020 [employer]    Calorie Counting for Weight Loss  Calories are units of energy. Your body needs a certain amount of calories from food to keep you going throughout the day. When you eat more calories than your body needs, your body stores the extra calories as fat. When you eat fewer calories than your body needs, your body burns fat to get the energy it needs.  Calorie counting means keeping track of how many calories you eat and drink each day. Calorie counting can be helpful if you need to lose weight. If you make sure to eat fewer calories than your body needs, you should lose weight. Ask your health care provider what a healthy weight is for you.  For calorie counting to work, you will need to eat the right number of calories in a day in order to lose a healthy amount of weight per week. A dietitian can help you determine how many calories you need in a day and will give you suggestions on how to reach your calorie goal.  · A healthy amount of weight to lose per week is usually 1-2 lb (0.5-0.9 kg). This usually means that your daily calorie intake should be reduced by 500-750 calories.  · Eating 1,200 - 1,500 calories per day can help most women lose weight.  · Eating 1,500 - 1,800 calories per day can help most men lose weight.  What is my plan?  My goal is to have __________ calories per day.  If I have this many calories per day, I should lose around  __________ pounds per week.  What do I need to know about calorie counting?  In order to meet your daily calorie goal, you will need to:  · Find out how many calories are in each food you would like to eat. Try to do this before you eat.  · Decide how much of the food you plan to eat.  · Write down what you ate and how many calories it had. Doing this is called keeping a food log.  To successfully lose weight, it is important to balance calorie counting with a healthy lifestyle that includes regular activity. Aim for 150 minutes of moderate exercise (such as walking) or 75 minutes of vigorous exercise (such as running) each week.  Where do I find calorie information?    The number of calories in a food can be found on a Nutrition Facts label. If a food does not have a Nutrition Facts label, try to look up the calories online or ask your dietitian for help.  Remember that calories are listed per serving. If you choose to have more than one serving of a food, you will have to multiply the calories per serving by the amount of servings you plan to eat. For example, the label on a package of bread might say that a serving size is 1 slice and that there are 90 calories in a serving. If you eat 1 slice, you will have eaten 90 calories. If you eat 2 slices, you will have eaten 180 calories.  How do I keep a food log?  Immediately after each meal, record the following information in your food log:  · What you ate. Don't forget to include toppings, sauces, and other extras on the food.  · How much you ate. This can be measured in cups, ounces, or number of items.  · How many calories each food and drink had.  · The total number of calories in the meal.  Keep your food log near you, such as in a small notebook in your pocket, or use a mobile patria or website. Some programs will calculate calories for you and show you how many calories you have left for the day to meet your goal.  What are some calorie counting tips?    · Use  "your calories on foods and drinks that will fill you up and not leave you hungry:  ? Some examples of foods that fill you up are nuts and nut butters, vegetables, lean proteins, and high-fiber foods like whole grains. High-fiber foods are foods with more than 5 g fiber per serving.  ? Drinks such as sodas, specialty coffee drinks, alcohol, and juices have a lot of calories, yet do not fill you up.  · Eat nutritious foods and avoid empty calories. Empty calories are calories you get from foods or beverages that do not have many vitamins or protein, such as candy, sweets, and soda. It is better to have a nutritious high-calorie food (such as an avocado) than a food with few nutrients (such as a bag of chips).  · Know how many calories are in the foods you eat most often. This will help you calculate calorie counts faster.  · Pay attention to calories in drinks. Low-calorie drinks include water and unsweetened drinks.  · Pay attention to nutrition labels for \"low fat\" or \"fat free\" foods. These foods sometimes have the same amount of calories or more calories than the full fat versions. They also often have added sugar, starch, or salt, to make up for flavor that was removed with the fat.  · Find a way of tracking calories that works for you. Get creative. Try different apps or programs if writing down calories does not work for you.  What are some portion control tips?  · Know how many calories are in a serving. This will help you know how many servings of a certain food you can have.  · Use a measuring cup to measure serving sizes. You could also try weighing out portions on a kitchen scale. With time, you will be able to estimate serving sizes for some foods.  · Take some time to put servings of different foods on your favorite plates, bowls, and cups so you know what a serving looks like.  · Try not to eat straight from a bag or box. Doing this can lead to overeating. Put the amount you would like to eat in a cup " "or on a plate to make sure you are eating the right portion.  · Use smaller plates, glasses, and bowls to prevent overeating.  · Try not to multitask (for example, watch TV or use your computer) while eating. If it is time to eat, sit down at a table and enjoy your food. This will help you to know when you are full. It will also help you to be aware of what you are eating and how much you are eating.  What are tips for following this plan?  Reading food labels  · Check the calorie count compared to the serving size. The serving size may be smaller than what you are used to eating.  · Check the source of the calories. Make sure the food you are eating is high in vitamins and protein and low in saturated and trans fats.  Shopping  · Read nutrition labels while you shop. This will help you make healthy decisions before you decide to purchase your food.  · Make a grocery list and stick to it.  Cooking  · Try to cook your favorite foods in a healthier way. For example, try baking instead of frying.  · Use low-fat dairy products.  Meal planning  · Use more fruits and vegetables. Half of your plate should be fruits and vegetables.  · Include lean proteins like poultry and fish.  How do I count calories when eating out?  · Ask for smaller portion sizes.  · Consider sharing an entree and sides instead of getting your own entree.  · If you get your own entree, eat only half. Ask for a box at the beginning of your meal and put the rest of your entree in it so you are not tempted to eat it.  · If calories are listed on the menu, choose the lower calorie options.  · Choose dishes that include vegetables, fruits, whole grains, low-fat dairy products, and lean protein.  · Choose items that are boiled, broiled, grilled, or steamed. Stay away from items that are buttered, battered, fried, or served with cream sauce. Items labeled \"crispy\" are usually fried, unless stated otherwise.  · Choose water, low-fat milk, unsweetened iced " tea, or other drinks without added sugar. If you want an alcoholic beverage, choose a lower calorie option such as a glass of wine or light beer.  · Ask for dressings, sauces, and syrups on the side. These are usually high in calories, so you should limit the amount you eat.  · If you want a salad, choose a garden salad and ask for grilled meats. Avoid extra toppings like couch, cheese, or fried items. Ask for the dressing on the side, or ask for olive oil and vinegar or lemon to use as dressing.  · Estimate how many servings of a food you are given. For example, a serving of cooked rice is ½ cup or about the size of half a baseball. Knowing serving sizes will help you be aware of how much food you are eating at restaurants. The list below tells you how big or small some common portion sizes are based on everyday objects:  ? 1 oz--4 stacked dice.  ? 3 oz--1 deck of cards.  ? 1 tsp--1 die.  ? 1 Tbsp--½ a ping-pong ball.  ? 2 Tbsp--1 ping-pong ball.  ? ½ cup--½ baseball.  ? 1 cup--1 baseball.  Summary  · Calorie counting means keeping track of how many calories you eat and drink each day. If you eat fewer calories than your body needs, you should lose weight.  · A healthy amount of weight to lose per week is usually 1-2 lb (0.5-0.9 kg). This usually means reducing your daily calorie intake by 500-750 calories.  · The number of calories in a food can be found on a Nutrition Facts label. If a food does not have a Nutrition Facts label, try to look up the calories online or ask your dietitian for help.  · Use your calories on foods and drinks that will fill you up, and not on foods and drinks that will leave you hungry.  · Use smaller plates, glasses, and bowls to prevent overeating.  This information is not intended to replace advice given to you by your health care provider. Make sure you discuss any questions you have with your health care provider.  Document Released: 12/18/2006 Document Revised: 09/06/2019 Document  Reviewed: 11/17/2017  [employer] Patient Education © 2020 [employer]

## 2020-07-14 NOTE — PROGRESS NOTES
Subjective   Patricia Brown is a 50 y.o. female.   Chief Complaint   Patient presents with   • Hypertension   • Hyperlipidemia   • Rash      Hypertension   This is a chronic problem. The current episode started more than 1 year ago. The problem has been gradually improving since onset. The problem is controlled. Associated symptoms include peripheral edema. Pertinent negatives include no anxiety, blurred vision, chest pain, headaches, malaise/fatigue, neck pain, orthopnea, palpitations, PND, shortness of breath or sweats. There are no associated agents to hypertension. Risk factors for coronary artery disease include diabetes mellitus, family history, dyslipidemia, sedentary lifestyle and obesity. Past treatments include ACE inhibitors. Current antihypertension treatment includes ACE inhibitors. The current treatment provides mild improvement. Compliance problems include diet and exercise.  There is no history of angina, kidney disease, CAD/MI, CVA, heart failure, left ventricular hypertrophy, PVD or retinopathy. There is no history of chronic renal disease, coarctation of the aorta, hyperaldosteronism, hypercortisolism, hyperparathyroidism, a hypertension causing med, pheochromocytoma, renovascular disease, sleep apnea or a thyroid problem.   Hyperlipidemia   This is a chronic problem. The current episode started more than 1 year ago. The problem is controlled. Recent lipid tests were reviewed and are variable. Exacerbating diseases include diabetes and obesity. She has no history of chronic renal disease, hypothyroidism, liver disease or nephrotic syndrome. Factors aggravating her hyperlipidemia include fatty foods. Pertinent negatives include no chest pain, focal sensory loss, focal weakness, leg pain, myalgias or shortness of breath. Current antihyperlipidemic treatment includes diet change and statins. The current treatment provides mild improvement of lipids. Compliance problems include adherence to diet.   "Risk factors for coronary artery disease include hypertension, obesity, a sedentary lifestyle and dyslipidemia.   Obesity   This is a chronic problem. The current episode started more than 1 year ago. The problem has been waxing and waning. Associated symptoms include a rash. Pertinent negatives include no anorexia, chest pain, congestion, coughing, fatigue, fever, headaches, myalgias, neck pain, sore throat or vomiting. The treatment provided no relief.   Rash   This is a new problem. The current episode started in the past 7 days. The problem has been waxing and waning since onset. The affected locations include the left arm, right arm, chest, neck and torso. The rash is characterized by itchiness (fine rash ). She was exposed to a new animal. Associated symptoms include rhinorrhea. Pertinent negatives include no anorexia, congestion, cough, diarrhea, eye pain, facial edema, fatigue, fever, joint pain, nail changes, shortness of breath, sore throat or vomiting. Past treatments include nothing (has not been taking her antihistamines ).      Left ear with a skin lesion. Constantly picking at the side.   Small rash to both hands at the thumb area. Itching.   Has been there for 3 weeks.   Has 3 dogs.   Patient \"I am stressed out now due to traffic trying to get here.\"  Has a small lesion on the outside auricle of left ear. \" I keep picking off the scab.\" No current bleeding, Left tip of ear will scab then pick it off.     The following portions of the patient's history were reviewed and updated as appropriate: allergies, current medications, past family history, past medical history, past social history, past surgical history and problem list.    Review of Systems   Constitutional: Negative.  Negative for fatigue, fever and malaise/fatigue.   HENT: Positive for rhinorrhea. Negative for congestion and sore throat.    Eyes: Negative.  Negative for blurred vision and pain.   Respiratory: Negative.  Negative for cough and " "shortness of breath.    Cardiovascular: Negative.  Negative for chest pain, palpitations, orthopnea and PND.   Gastrointestinal: Negative.  Negative for anorexia, diarrhea and vomiting.   Musculoskeletal: Negative.  Negative for joint pain, myalgias and neck pain.   Skin: Positive for rash. Negative for nail changes.   Allergic/Immunologic: Positive for environmental allergies.   Neurological: Negative.  Negative for focal weakness and headaches.   Hematological: Negative.      Past Surgical History:   Procedure Laterality Date   • CERVIX SURGERY      cervical scraping for cancer     Past Medical History:   Diagnosis Date   • Bipolar 1 disorder (CMS/HCC)    • Cancer (CMS/HCC)     hx cervical cancer -    • Diabetes mellitus (CMS/HCC)    • Fibrocystic breast    • Heart murmur    • Hyperlipidemia    • Hypertension    • Obesity    • Visual impairment     glasses        Objective   No Known Allergies  Visit Vitals  /82   Pulse 75   Temp 97.8 °F (36.6 °C)   Resp 20   Ht 160 cm (63\")   Wt 98.4 kg (217 lb)   SpO2 99%   BMI 38.44 kg/m²       Physical Exam   Constitutional: She is oriented to person, place, and time. Vital signs are normal. She appears well-developed and well-nourished. She is cooperative. She does not appear ill.   HENT:   Head: Normocephalic.   Eyes: Pupils are equal, round, and reactive to light.   Neck: Normal range of motion.   Cardiovascular: Intact distal pulses.   Murmur heard.  Pulmonary/Chest: Effort normal and breath sounds normal.   Abdominal: Soft. Bowel sounds are normal.   Musculoskeletal: Normal range of motion.   Neurological: She is alert and oriented to person, place, and time.   Skin: Skin is warm, dry and intact. Capillary refill takes less than 2 seconds. Rash noted.   Eczema type rash to arms, neck areas. She has stopped taking her antihistamines but has continued exposure to her dog.      Psychiatric: She has a normal mood and affect. Her behavior is normal. Judgment and thought " content normal. Cognition and memory are normal.   Vitals reviewed.      Assessment/Plan   Patricia was seen today for hypertension, hyperlipidemia and rash.    Diagnoses and all orders for this visit:    Mixed hyperlipidemia  -     pravastatin (PRAVACHOL) 40 MG tablet; Take 1 tablet by mouth Daily.  -     Lipid Panel    Hypertension, unspecified type  -     enalapril (VASOTEC) 5 MG tablet; Take 1 tablet by mouth Daily. Will need to be seen for another refill  -     CBC & Differential  -     Comprehensive Metabolic Panel  -     TSH  -     CBC Auto Differential  -     Manual Differential; Future  -     Cancel: Manual Differential    Type 2 diabetes mellitus without complication, without long-term current use of insulin (CMS/[facility])  -     pravastatin (PRAVACHOL) 40 MG tablet; Take 1 tablet by mouth Daily.  -     CBC & Differential  -     Comprehensive Metabolic Panel  -     TSH  -     POC Urinalysis Dipstick, Automated  -     CBC Auto Differential  -     Manual Differential; Future  -     Cancel: Manual Differential    Low vitamin D level  -     Vitamin D 25 Hydroxy    Skin lesion of left ear  -     mupirocin (Bactroban) 2 % ointment; Apply  topically to the appropriate area as directed 3 (Three) Times a Day.    Heart murmur    Class 2 severe obesity due to excess calories with serious comorbidity and body mass index (BMI) of 39.0 to 39.9 in adult (CMS/[facility])    Eczema, allergic  -     triamcinolone (KENALOG) 0.025 % ointment; Apply  topically to the appropriate area as directed 2 (Two) Times a Day.    benadryl spray for the itching.   Reorder mupirocin ointment for left ear.   Had not been taking the pravachol due to issues with insurance.   Take your antihistamine daily. Restart.   Avoid contact with known allergy causing agents.   See high lipid and HTN patient instructions   Will check labs.   Refill medicatons.   Follow up as needed.              Patient Instructions   High Cholesterol    High cholesterol is a condition  "in which the blood has high levels of a white, waxy, fat-like substance (cholesterol). The human body needs small amounts of cholesterol. The liver makes all the cholesterol that the body needs. Extra (excess) cholesterol comes from the food that we eat.  Cholesterol is carried from the liver by the blood through the blood vessels. If you have high cholesterol, deposits (plaques) may build up on the walls of your blood vessels (arteries). Plaques make the arteries narrower and stiffer. Cholesterol plaques increase your risk for heart attack and stroke. Work with your health care provider to keep your cholesterol levels in a healthy range.  What increases the risk?  This condition is more likely to develop in people who:  · Eat foods that are high in animal fat (saturated fat) or cholesterol.  · Are overweight.  · Are not getting enough exercise.  · Have a family history of high cholesterol.  What are the signs or symptoms?  There are no symptoms of this condition.  How is this diagnosed?  This condition may be diagnosed from the results of a blood test.  · If you are older than age 20, your health care provider may check your cholesterol every 4-6 years.  · You may be checked more often if you already have high cholesterol or other risk factors for heart disease.  The blood test for cholesterol measures:  · \"Bad\" cholesterol (LDL cholesterol). This is the main type of cholesterol that causes heart disease. The desired level for LDL is less than 100.  · \"Good\" cholesterol (HDL cholesterol). This type helps to protect against heart disease by cleaning the arteries and carrying the LDL away. The desired level for HDL is 60 or higher.  · Triglycerides. These are fats that the body can store or burn for energy. The desired number for triglycerides is lower than 150.  · Total cholesterol. This is a measure of the total amount of cholesterol in your blood, including LDL cholesterol, HDL cholesterol, and triglycerides. A " healthy number is less than 200.  How is this treated?  This condition is treated with diet changes, lifestyle changes, and medicines.  Diet changes  · This may include eating more whole grains, fruits, vegetables, nuts, and fish.  · This may also include cutting back on red meat and foods that have a lot of added sugar.  Lifestyle changes  · Changes may include getting at least 40 minutes of aerobic exercise 3 times a week. Aerobic exercises include walking, biking, and swimming. Aerobic exercise along with a healthy diet can help you maintain a healthy weight.  · Changes may also include quitting smoking.  Medicines  · Medicines are usually given if diet and lifestyle changes have failed to reduce your cholesterol to healthy levels.  · Your health care provider may prescribe a statin medicine. Statin medicines have been shown to reduce cholesterol, which can reduce the risk of heart disease.  Follow these instructions at home:  Eating and drinking  If told by your health care provider:  · Eat chicken (without skin), fish, veal, shellfish, ground turkey breast, and round or loin cuts of red meat.  · Do not eat fried foods or fatty meats, such as hot dogs and salami.  · Eat plenty of fruits, such as apples.  · Eat plenty of vegetables, such as broccoli, potatoes, and carrots.  · Eat beans, peas, and lentils.  · Eat grains such as barley, rice, couscous, and bulgur wheat.  · Eat pasta without cream sauces.  · Use skim or nonfat milk, and eat low-fat or nonfat yogurt and cheeses.  · Do not eat or drink whole milk, cream, ice cream, egg yolks, or hard cheeses.  · Do not eat stick margarine or tub margarines that contain trans fats (also called partially hydrogenated oils).  · Do not eat saturated tropical oils, such as coconut oil and palm oil.  · Do not eat cakes, cookies, crackers, or other baked goods that contain trans fats.    General instructions  · Exercise as directed by your health care provider. Increase your  activity level with activities such as gardening, walking, and taking the stairs.  · Take over-the-counter and prescription medicines only as told by your health care provider.  · Do not use any products that contain nicotine or tobacco, such as cigarettes and e-cigarettes. If you need help quitting, ask your health care provider.  · Keep all follow-up visits as told by your health care provider. This is important.  Contact a health care provider if:  · You are struggling to maintain a healthy diet or weight.  · You need help to start on an exercise program.  · You need help to stop smoking.  Get help right away if:  · You have chest pain.  · You have trouble breathing.  This information is not intended to replace advice given to you by your health care provider. Make sure you discuss any questions you have with your health care provider.  Document Released: 12/18/2006 Document Revised: 12/21/2018 Document Reviewed: 06/17/2017  [employer] Patient Education © 2020 [employer]  Hypertension, Adult  Hypertension is another name for high blood pressure. High blood pressure forces your heart to work harder to pump blood. This can cause problems over time.  There are two numbers in a blood pressure reading. There is a top number (systolic) over a bottom number (diastolic). It is best to have a blood pressure that is below 120/80. Healthy choices can help lower your blood pressure, or you may need medicine to help lower it.  What are the causes?  The cause of this condition is not known. Some conditions may be related to high blood pressure.  What increases the risk?  · Smoking.  · Having type 2 diabetes mellitus, high cholesterol, or both.  · Not getting enough exercise or physical activity.  · Being overweight.  · Having too much fat, sugar, calories, or salt (sodium) in your diet.  · Drinking too much alcohol.  · Having long-term (chronic) kidney disease.  · Having a family history of high blood pressure.  · Age. Risk  increases with age.  · Race. You may be at higher risk if you are .  · Gender. Men are at higher risk than women before age 45. After age 65, women are at higher risk than men.  · Having obstructive sleep apnea.  · Stress.  What are the signs or symptoms?  · High blood pressure may not cause symptoms. Very high blood pressure (hypertensive crisis) may cause:  ? Headache.  ? Feelings of worry or nervousness (anxiety).  ? Shortness of breath.  ? Nosebleed.  ? A feeling of being sick to your stomach (nausea).  ? Throwing up (vomiting).  ? Changes in how you see.  ? Very bad chest pain.  ? Seizures.  How is this treated?  · This condition is treated by making healthy lifestyle changes, such as:  ? Eating healthy foods.  ? Exercising more.  ? Drinking less alcohol.  · Your health care provider may prescribe medicine if lifestyle changes are not enough to get your blood pressure under control, and if:  ? Your top number is above 130.  ? Your bottom number is above 80.  · Your personal target blood pressure may vary.  Follow these instructions at home:  Eating and drinking    · If told, follow the DASH eating plan. To follow this plan:  ? Fill one half of your plate at each meal with fruits and vegetables.  ? Fill one fourth of your plate at each meal with whole grains. Whole grains include whole-wheat pasta, brown rice, and whole-grain bread.  ? Eat or drink low-fat dairy products, such as skim milk or low-fat yogurt.  ? Fill one fourth of your plate at each meal with low-fat (lean) proteins. Low-fat proteins include fish, chicken without skin, eggs, beans, and tofu.  ? Avoid fatty meat, cured and processed meat, or chicken with skin.  ? Avoid pre-made or processed food.  · Eat less than 1,500 mg of salt each day.  · Do not drink alcohol if:  ? Your doctor tells you not to drink.  ? You are pregnant, may be pregnant, or are planning to become pregnant.  · If you drink alcohol:  ? Limit how much you use  to:  § 0-1 drink a day for women.  § 0-2 drinks a day for men.  ? Be aware of how much alcohol is in your drink. In the [place] one drink equals one 12 oz bottle of beer (355 mL), one 5 oz glass of wine (148 mL), or one 1½ oz glass of hard liquor (44 mL).  Lifestyle    · Work with your doctor to stay at a healthy weight or to lose weight. Ask your doctor what the best weight is for you.  · Get at least 30 minutes of exercise most days of the week. This may include walking, swimming, or biking.  · Get at least 30 minutes of exercise that strengthens your muscles (resistance exercise) at least 3 days a week. This may include lifting weights or doing Pilates.  · Do not use any products that contain nicotine or tobacco, such as cigarettes, e-cigarettes, and chewing tobacco. If you need help quitting, ask your doctor.  · Check your blood pressure at home as told by your doctor.  · Keep all follow-up visits as told by your doctor. This is important.  Medicines  · Take over-the-counter and prescription medicines only as told by your doctor. Follow directions carefully.  · Do not skip doses of blood pressure medicine. The medicine does not work as well if you skip doses. Skipping doses also puts you at risk for problems.  · Ask your doctor about side effects or reactions to medicines that you should watch for.  Contact a doctor if you:  · Think you are having a reaction to the medicine you are taking.  · Have headaches that keep coming back (recurring).  · Feel dizzy.  · Have swelling in your ankles.  · Have trouble with your vision.  Get help right away if you:  · Get a very bad headache.  · Start to feel mixed up (confused).  · Feel weak or numb.  · Feel faint.  · Have very bad pain in your:  ? Chest.  ? Belly (abdomen).  · Throw up more than once.  · Have trouble breathing.  Summary  · Hypertension is another name for high blood pressure.  · High blood pressure forces your heart to work harder to pump blood.  · For most  people, a normal blood pressure is less than 120/80.  · Making healthy choices can help lower blood pressure. If your blood pressure does not get lower with healthy choices, you may need to take medicine.  This information is not intended to replace advice given to you by your health care provider. Make sure you discuss any questions you have with your health care provider.  Document Released: 06/05/2009 Document Revised: 08/28/2019 Document Reviewed: 08/28/2019  [employer] Patient Education © 2020 [employer]    Calorie Counting for Weight Loss  Calories are units of energy. Your body needs a certain amount of calories from food to keep you going throughout the day. When you eat more calories than your body needs, your body stores the extra calories as fat. When you eat fewer calories than your body needs, your body burns fat to get the energy it needs.  Calorie counting means keeping track of how many calories you eat and drink each day. Calorie counting can be helpful if you need to lose weight. If you make sure to eat fewer calories than your body needs, you should lose weight. Ask your health care provider what a healthy weight is for you.  For calorie counting to work, you will need to eat the right number of calories in a day in order to lose a healthy amount of weight per week. A dietitian can help you determine how many calories you need in a day and will give you suggestions on how to reach your calorie goal.  · A healthy amount of weight to lose per week is usually 1-2 lb (0.5-0.9 kg). This usually means that your daily calorie intake should be reduced by 500-750 calories.  · Eating 1,200 - 1,500 calories per day can help most women lose weight.  · Eating 1,500 - 1,800 calories per day can help most men lose weight.  What is my plan?  My goal is to have __________ calories per day.  If I have this many calories per day, I should lose around __________ pounds per week.  What do I need to know about calorie  counting?  In order to meet your daily calorie goal, you will need to:  · Find out how many calories are in each food you would like to eat. Try to do this before you eat.  · Decide how much of the food you plan to eat.  · Write down what you ate and how many calories it had. Doing this is called keeping a food log.  To successfully lose weight, it is important to balance calorie counting with a healthy lifestyle that includes regular activity. Aim for 150 minutes of moderate exercise (such as walking) or 75 minutes of vigorous exercise (such as running) each week.  Where do I find calorie information?    The number of calories in a food can be found on a Nutrition Facts label. If a food does not have a Nutrition Facts label, try to look up the calories online or ask your dietitian for help.  Remember that calories are listed per serving. If you choose to have more than one serving of a food, you will have to multiply the calories per serving by the amount of servings you plan to eat. For example, the label on a package of bread might say that a serving size is 1 slice and that there are 90 calories in a serving. If you eat 1 slice, you will have eaten 90 calories. If you eat 2 slices, you will have eaten 180 calories.  How do I keep a food log?  Immediately after each meal, record the following information in your food log:  · What you ate. Don't forget to include toppings, sauces, and other extras on the food.  · How much you ate. This can be measured in cups, ounces, or number of items.  · How many calories each food and drink had.  · The total number of calories in the meal.  Keep your food log near you, such as in a small notebook in your pocket, or use a mobile patria or website. Some programs will calculate calories for you and show you how many calories you have left for the day to meet your goal.  What are some calorie counting tips?    · Use your calories on foods and drinks that will fill you up and not  "leave you hungry:  ? Some examples of foods that fill you up are nuts and nut butters, vegetables, lean proteins, and high-fiber foods like whole grains. High-fiber foods are foods with more than 5 g fiber per serving.  ? Drinks such as sodas, specialty coffee drinks, alcohol, and juices have a lot of calories, yet do not fill you up.  · Eat nutritious foods and avoid empty calories. Empty calories are calories you get from foods or beverages that do not have many vitamins or protein, such as candy, sweets, and soda. It is better to have a nutritious high-calorie food (such as an avocado) than a food with few nutrients (such as a bag of chips).  · Know how many calories are in the foods you eat most often. This will help you calculate calorie counts faster.  · Pay attention to calories in drinks. Low-calorie drinks include water and unsweetened drinks.  · Pay attention to nutrition labels for \"low fat\" or \"fat free\" foods. These foods sometimes have the same amount of calories or more calories than the full fat versions. They also often have added sugar, starch, or salt, to make up for flavor that was removed with the fat.  · Find a way of tracking calories that works for you. Get creative. Try different apps or programs if writing down calories does not work for you.  What are some portion control tips?  · Know how many calories are in a serving. This will help you know how many servings of a certain food you can have.  · Use a measuring cup to measure serving sizes. You could also try weighing out portions on a kitchen scale. With time, you will be able to estimate serving sizes for some foods.  · Take some time to put servings of different foods on your favorite plates, bowls, and cups so you know what a serving looks like.  · Try not to eat straight from a bag or box. Doing this can lead to overeating. Put the amount you would like to eat in a cup or on a plate to make sure you are eating the right " "portion.  · Use smaller plates, glasses, and bowls to prevent overeating.  · Try not to multitask (for example, watch TV or use your computer) while eating. If it is time to eat, sit down at a table and enjoy your food. This will help you to know when you are full. It will also help you to be aware of what you are eating and how much you are eating.  What are tips for following this plan?  Reading food labels  · Check the calorie count compared to the serving size. The serving size may be smaller than what you are used to eating.  · Check the source of the calories. Make sure the food you are eating is high in vitamins and protein and low in saturated and trans fats.  Shopping  · Read nutrition labels while you shop. This will help you make healthy decisions before you decide to purchase your food.  · Make a grocery list and stick to it.  Cooking  · Try to cook your favorite foods in a healthier way. For example, try baking instead of frying.  · Use low-fat dairy products.  Meal planning  · Use more fruits and vegetables. Half of your plate should be fruits and vegetables.  · Include lean proteins like poultry and fish.  How do I count calories when eating out?  · Ask for smaller portion sizes.  · Consider sharing an entree and sides instead of getting your own entree.  · If you get your own entree, eat only half. Ask for a box at the beginning of your meal and put the rest of your entree in it so you are not tempted to eat it.  · If calories are listed on the menu, choose the lower calorie options.  · Choose dishes that include vegetables, fruits, whole grains, low-fat dairy products, and lean protein.  · Choose items that are boiled, broiled, grilled, or steamed. Stay away from items that are buttered, battered, fried, or served with cream sauce. Items labeled \"crispy\" are usually fried, unless stated otherwise.  · Choose water, low-fat milk, unsweetened iced tea, or other drinks without added sugar. If you want " an alcoholic beverage, choose a lower calorie option such as a glass of wine or light beer.  · Ask for dressings, sauces, and syrups on the side. These are usually high in calories, so you should limit the amount you eat.  · If you want a salad, choose a garden salad and ask for grilled meats. Avoid extra toppings like couch, cheese, or fried items. Ask for the dressing on the side, or ask for olive oil and vinegar or lemon to use as dressing.  · Estimate how many servings of a food you are given. For example, a serving of cooked rice is ½ cup or about the size of half a baseball. Knowing serving sizes will help you be aware of how much food you are eating at restaurants. The list below tells you how big or small some common portion sizes are based on everyday objects:  ? 1 oz--4 stacked dice.  ? 3 oz--1 deck of cards.  ? 1 tsp--1 die.  ? 1 Tbsp--½ a ping-pong ball.  ? 2 Tbsp--1 ping-pong ball.  ? ½ cup--½ baseball.  ? 1 cup--1 baseball.  Summary  · Calorie counting means keeping track of how many calories you eat and drink each day. If you eat fewer calories than your body needs, you should lose weight.  · A healthy amount of weight to lose per week is usually 1-2 lb (0.5-0.9 kg). This usually means reducing your daily calorie intake by 500-750 calories.  · The number of calories in a food can be found on a Nutrition Facts label. If a food does not have a Nutrition Facts label, try to look up the calories online or ask your dietitian for help.  · Use your calories on foods and drinks that will fill you up, and not on foods and drinks that will leave you hungry.  · Use smaller plates, glasses, and bowls to prevent overeating.  This information is not intended to replace advice given to you by your health care provider. Make sure you discuss any questions you have with your health care provider.  Document Released: 12/18/2006 Document Revised: 09/06/2019 Document Reviewed: 11/17/2017  Elsevier Patient Education ©  2020 [employer].        Mariela Hall, APRN

## 2020-07-15 LAB — 25(OH)D3 SERPL-MCNC: 41 NG/ML (ref 30–100)

## 2020-07-19 PROBLEM — E11.9 DIABETES MELLITUS: Status: ACTIVE | Noted: 2020-07-19

## 2020-09-15 ENCOUNTER — OFFICE VISIT (OUTPATIENT)
Dept: ENDOCRINOLOGY | Facility: CLINIC | Age: 50
End: 2020-09-15

## 2020-09-15 ENCOUNTER — LAB (OUTPATIENT)
Dept: LAB | Facility: HOSPITAL | Age: 50
End: 2020-09-15

## 2020-09-15 VITALS
OXYGEN SATURATION: 97 % | WEIGHT: 214 LBS | HEART RATE: 78 BPM | SYSTOLIC BLOOD PRESSURE: 112 MMHG | DIASTOLIC BLOOD PRESSURE: 70 MMHG | BODY MASS INDEX: 37.91 KG/M2

## 2020-09-15 DIAGNOSIS — E11.65 UNCONTROLLED TYPE 2 DIABETES MELLITUS WITH HYPERGLYCEMIA (HCC): Primary | ICD-10-CM

## 2020-09-15 DIAGNOSIS — E66.01 CLASS 2 SEVERE OBESITY DUE TO EXCESS CALORIES WITH SERIOUS COMORBIDITY AND BODY MASS INDEX (BMI) OF 39.0 TO 39.9 IN ADULT (HCC): ICD-10-CM

## 2020-09-15 DIAGNOSIS — E78.2 MIXED HYPERLIPIDEMIA: ICD-10-CM

## 2020-09-15 LAB
ALBUMIN UR-MCNC: <1.2 MG/DL
CREAT UR-MCNC: 133.3 MG/DL
GLUCOSE BLDC GLUCOMTR-MCNC: 275 MG/DL (ref 70–130)
HBA1C MFR BLD: 9.7 %
MICROALBUMIN/CREAT UR: NORMAL MG/G{CREAT}

## 2020-09-15 PROCEDURE — 82570 ASSAY OF URINE CREATININE: CPT | Performed by: PHYSICIAN ASSISTANT

## 2020-09-15 PROCEDURE — 82043 UR ALBUMIN QUANTITATIVE: CPT | Performed by: PHYSICIAN ASSISTANT

## 2020-09-15 PROCEDURE — 99215 OFFICE O/P EST HI 40 MIN: CPT | Performed by: PHYSICIAN ASSISTANT

## 2020-09-15 PROCEDURE — 83036 HEMOGLOBIN GLYCOSYLATED A1C: CPT | Performed by: PHYSICIAN ASSISTANT

## 2020-09-15 PROCEDURE — 82947 ASSAY GLUCOSE BLOOD QUANT: CPT | Performed by: PHYSICIAN ASSISTANT

## 2020-09-15 RX ORDER — INSULIN GLARGINE AND LIXISENATIDE 100; 33 U/ML; UG/ML
INJECTION, SOLUTION SUBCUTANEOUS
Qty: 18 PEN | Refills: 1 | Status: SHIPPED | OUTPATIENT
Start: 2020-09-15 | End: 2020-09-23 | Stop reason: SDUPTHER

## 2020-09-15 NOTE — PATIENT INSTRUCTIONS
Stop Ozempic  When your next Ozempic injection is due you will start Soliqua 15 units daily (before your first meal of the day). Increase by 2 units every 3 days until fasting sugar is consistently <150. Can increase up to 60 units daily. Please check sugar fasting and at bedtime and call with readings in 4 weeks.

## 2020-09-15 NOTE — PROGRESS NOTES
Chief Complaint   Patient presents with   • Follow-up     DM 2        HPI   Patricia Brown is a 50 y.o. female had concerns including Follow-up (DM 2).    Diabetes was diagnosed 27 years ago with GDM. Required insulin during that pregnancy.   Complications include: none known.  Last ophtho exam was done recently at [facility] but reports this wasn't dilated.  Current medications: Ozempic 0.5mg weekly. In the past was on glimepiride and trulicity 1.5 weekly. Has been on humalog, glucophage/metformin in the past. Had a GI intolerance to metformin.   She checks her blood sugar 1-2 times per day at most but doesn't check every day. BGs are 250-350s. No hypoglycemia. No meter or log for review.    Her BG is 275 today and is a fasting reading.    Has lost about 3 pounds since last visit.   Admits to poor diet, particularly breads, pasta, desserts.  Craves sweets often.   She is [place] and carbohydrates are a big part of her life/culture.  Not exercising.  She is very hesitant to take insulin.      Is able to lose weight but has a hard time maintaining.     Past Medical History:   Diagnosis Date   • Bipolar 1 disorder (CMS/HCC)    • Cancer (CMS/HCC)     hx cervical cancer -    • Diabetes mellitus (CMS/HCC)    • Fibrocystic breast    • Heart murmur    • Hyperlipidemia    • Hypertension    • Obesity    • Visual impairment     glasses      Past Surgical History:   Procedure Laterality Date   • CERVIX SURGERY      cervical scraping for cancer      Family History   Problem Relation Age of Onset   • Diabetes Mother    • Heart disease Mother    • Obesity Mother    • Diabetes Father       Social History     Socioeconomic History   • Marital status:      Spouse name: Not on file   • Number of children: Not on file   • Years of education: Not on file   • Highest education level: Not on file   Tobacco Use   • Smoking status: Former Smoker     Quit date: 2005     Years since quitting: 15.7   • Smokeless tobacco: Never Used    Substance and Sexual Activity   • Alcohol use: Not Currently   • Drug use: Never   • Sexual activity: Yes     Partners: Male   Social History Narrative         Children 2 - ages 25, 23      No Known Allergies   Current Outpatient Medications on File Prior to Visit   Medication Sig Dispense Refill   • buPROPion XL (WELLBUTRIN XL) 150 MG 24 hr tablet Take 150 mg by mouth Daily.     • Cariprazine HCl (VRAYLAR) 4.5 MG capsule Take  by mouth Daily.     • clonazePAM (KlonoPIN) 0.5 MG tablet Take 0.5 mg by mouth 3 (Three) Times a Day As Needed for Seizures.     • enalapril (VASOTEC) 5 MG tablet Take 1 tablet by mouth Daily. Will need to be seen for another refill 90 tablet 1   • glucose blood test strip 1 each by Other route 3 (Three) Times a Day As Needed (three times a day and prn). Use as instructed 120 each 12   • Lancets (FREESTYLE) lancets As previously directed 60 each 0   • lithium carbonate 300 MG capsule Take 300 mg by mouth 2 (Two) Times a Day With Meals. 2 in am, 3 at night      • mupirocin (Bactroban) 2 % ointment Apply  topically to the appropriate area as directed 3 (Three) Times a Day. 1 each 0   • pravastatin (PRAVACHOL) 40 MG tablet Take 1 tablet by mouth Daily. 90 tablet 1   • topiramate (TOPAMAX) 100 MG tablet Take 100 mg by mouth 2 (Two) Times a Day.     • triamcinolone (KENALOG) 0.025 % ointment Apply  topically to the appropriate area as directed 2 (Two) Times a Day. 1 tube 0   • [DISCONTINUED] Semaglutide,0.25 or 0.5MG/DOS, (Ozempic, 0.25 or 0.5 MG/DOSE,) 2 MG/1.5ML solution pen-injector Inject 0.25 mg under the skin into the appropriate area as directed 1 (One) Time Per Week. Increase to 0.5 mg after 4 weeks 3 pen 1     No current facility-administered medications on file prior to visit.         Review of Systems   Constitutional: Positive for fatigue and unexpected weight gain.   HENT: Negative.    Eyes: Negative.    Respiratory: Negative.    Cardiovascular: Negative.    Gastrointestinal:  Positive for diarrhea (chronic).   Endocrine: Positive for polydipsia and polyuria.        Hot flashes   Genitourinary: Negative.    Musculoskeletal: Negative.    Skin: Negative.    Allergic/Immunologic: Negative.    Neurological: Negative.    Hematological: Negative.    Psychiatric/Behavioral: Negative.       ROS was reviewed and verified. All other ROS negative.    /70   Pulse 78   Wt 97.1 kg (214 lb)   SpO2 97%   BMI 37.91 kg/m²      Physical Exam   Constitutional: She appears well-developed.     Constitutional:  well developed; well nourished  no acute distress  obese - Body mass index is 37.91 kg/m².   ENT/Thyroid: no thyromegaly  no palpable nodules   Eyes: EOM intact  Conjunctiva: clear   Respiratory:  breathing is unlabored  clear to auscultation bilaterally   Cardiovascular:  regular rate and rhythm, S1, S2 normal, no murmur, click, rub or gallop   Chest:  Not performed.   Abdomen: Not performed.   : Not performed.   Musculoskeletal: negative findings:  ROM of all joints is normal, no deformities present   Skin: dry and warm   Neuro: normal without focal findings and mental status, speech normal, alert and oriented x3   Psych: oriented to time, place and person, mood and affect are within normal limits     CMP:  Lab Results   Component Value Date    BUN 7 07/14/2020    CREATININE 0.92 07/14/2020    EGFRIFNONA 65 07/14/2020    BCR 7.6 07/14/2020     07/14/2020    K 5.3 (H) 07/14/2020    CO2 23.1 07/14/2020    CALCIUM 9.3 07/14/2020    ALBUMIN 4.40 07/14/2020    BILITOT 0.2 07/14/2020    ALKPHOS 85 07/14/2020    AST 39 (H) 07/14/2020    ALT 26 07/14/2020     Lipid Panel:  Lab Results   Component Value Date    CHOL 213 (H) 07/14/2020    TRIG 319 (H) 07/14/2020    HDL 53 07/14/2020    VLDL 63.8 (H) 07/14/2020    LDL 96 07/14/2020     HbA1c:  Lab Results   Component Value Date    HGBA1C 9.7 09/15/2020    HGBA1C 9.6 07/07/2020     Glucose:  Lab Results   Component Value Date    POCGLU 275 (A)  09/15/2020     TSH:  Lab Results   Component Value Date    TSH 1.290 07/14/2020       Assessment and Plan    Patricia was seen today for diabetes and med refill.    Diagnoses and all orders for this visit:    Type 2 diabetes mellitus with hyperglycemia, without long-term current use of insulin (CMS/[facility])  Discussed with patient diabetes is very poorly concontrolled with A1c 9.7. No improvement since starting Ozempic.   We discussed modifying diet will be integral to improving blood sugar control.   She agrees to see a dietitian to come up with some specific goals for her to work on.  Although she is hesitant to take insulin, after our discussion about poorly controlled diabetes she agrees to try Soliqua as it will help with fasting and post prandial readings. Samples and co-pay card provided. Administration reviewed.   She will stop Ozempic and when next Ozempic dose is due she will begin Soliqua 15 units in the morning before her first meal of the day. She will titrate dose up by 2 units every 3 days with goal of getting fasting blood sugar <150 for now, but ultimately <120. We discussed max daily dose of Soliqua is 60 units.   She would also benefit from SGLT2i but she is hesitant to take with potential side effects. We can consider trying when her blood sugar is under better control.    I have asked patient to check blood sugar readings fasting and bedtime and fax readings in 4 weeks for review.   Labs are up-to-date from PCPs office in January.  Monofilament done last visit.  Check microalbumin/creatinine ratio today.     Class 2 severe obesity due to excess calories with serious comorbidity and body mass index (BMI) of 39.0 to 39.9 in adult (CMS/[facility])  BMI 37.9.  We discussed importance of weight loss for diabetes control.      Mixed hyperlipidemia  She will have repeat lipid panel by PCP in January. Continue pravastatin for now but may need to change to Lipitor or Crestor. Hope for triglyceride improvement with  better blood sugar control    Return in about 3 months (around 12/15/2020). The patient was instructed to contact the clinic with any interval questions or concerns.     30 min  of 40 min face-to-face visit time spent for coordination of care and counselling regarding identified problems as outlined in the objective, assessment and discussion portions of the documentation.      Kasi Bansal PA-C

## 2020-09-17 ENCOUNTER — TELEPHONE (OUTPATIENT)
Dept: ENDOCRINOLOGY | Facility: CLINIC | Age: 50
End: 2020-09-17

## 2020-09-23 ENCOUNTER — TELEPHONE (OUTPATIENT)
Dept: ENDOCRINOLOGY | Facility: CLINIC | Age: 50
End: 2020-09-23

## 2020-09-23 ENCOUNTER — PRIOR AUTHORIZATION (OUTPATIENT)
Dept: ENDOCRINOLOGY | Facility: CLINIC | Age: 50
End: 2020-09-23

## 2020-09-23 DIAGNOSIS — E11.65 UNCONTROLLED TYPE 2 DIABETES MELLITUS WITH HYPERGLYCEMIA (HCC): ICD-10-CM

## 2020-09-23 RX ORDER — INSULIN GLARGINE AND LIXISENATIDE 100; 33 U/ML; UG/ML
INJECTION, SOLUTION SUBCUTANEOUS
Qty: 18 PEN | Refills: 1 | Status: SHIPPED | OUTPATIENT
Start: 2020-09-23 | End: 2021-01-04

## 2020-09-23 RX ORDER — INSULIN GLARGINE AND LIXISENATIDE 100; 33 U/ML; UG/ML
INJECTION, SOLUTION SUBCUTANEOUS
Qty: 18 PEN | Refills: 1 | Status: SHIPPED | OUTPATIENT
Start: 2020-09-23 | End: 2020-09-23 | Stop reason: SDUPTHER

## 2020-09-23 NOTE — TELEPHONE ENCOUNTER
PATIENT CALLED AND DOES NOT WANT HER SOLIQA MEDICATION TO BE CALLED INTO [facility]. SHE IS WANTING IT TO BE CALLED INTO LOCAL PHARMACY. [facility] IN [place] IS WHERE SHE WANTS THIS MEDICATION SENT.

## 2020-09-24 ENCOUNTER — TELEPHONE (OUTPATIENT)
Dept: ENDOCRINOLOGY | Facility: CLINIC | Age: 50
End: 2020-09-24

## 2020-10-09 ENCOUNTER — TELEPHONE (OUTPATIENT)
Dept: ENDOCRINOLOGY | Facility: CLINIC | Age: 50
End: 2020-10-09

## 2020-10-19 ENCOUNTER — DOCUMENTATION (OUTPATIENT)
Dept: ENDOCRINOLOGY | Facility: CLINIC | Age: 50
End: 2020-10-19

## 2020-10-19 NOTE — PROGRESS NOTES
Per Danae, referral coordinator:  Nutrition and education dept hasn't been able to reach this patient for scheduling. They mailed out a letter for the patient to call their office to schedule. I will call and see if I can reach this patient, and if not, I'll mail a letter, as well.

## 2020-11-11 RX ORDER — INSULIN LISPRO 100 [IU]/ML
INJECTION, SOLUTION INTRAVENOUS; SUBCUTANEOUS
Qty: 15 PEN | Refills: 1 | Status: SHIPPED | OUTPATIENT
Start: 2020-11-11 | End: 2021-01-19 | Stop reason: SDUPTHER

## 2020-11-11 NOTE — TELEPHONE ENCOUNTER
Called and spoke with patient, she verbalized understanding with the addition of insulin. She states that she will try that and give us a call in about 3-4 weeks with updated numbers. Medications pended.

## 2020-11-11 NOTE — TELEPHONE ENCOUNTER
PATIENT IS CALLING WITH ISSUES OF BLOOD SUGARS BEING HIGH AND INCREASING THE SOLIQUA MEDICATION TO 2 UNITS EVERY THREE DAYS. SHE STATES SHE DOES NOT THINK THE MEDICATION IS HELPING AND NEEDS TO BE ADVISED ON WHAT TO DO ABOUT BLOOD SUGARS. PHONE NUMBER [phone]

## 2021-01-03 DIAGNOSIS — E11.65 UNCONTROLLED TYPE 2 DIABETES MELLITUS WITH HYPERGLYCEMIA (HCC): ICD-10-CM

## 2021-01-04 RX ORDER — INSULIN GLARGINE AND LIXISENATIDE 100; 33 U/ML; UG/ML
INJECTION, SOLUTION SUBCUTANEOUS
Qty: 54 ML | Refills: 0 | Status: SHIPPED | OUTPATIENT
Start: 2021-01-04 | End: 2021-01-20 | Stop reason: SDUPTHER

## 2021-01-11 ENCOUNTER — OFFICE VISIT (OUTPATIENT)
Dept: FAMILY MEDICINE CLINIC | Facility: CLINIC | Age: 51
End: 2021-01-11

## 2021-01-11 VITALS
HEIGHT: 63 IN | OXYGEN SATURATION: 98 % | RESPIRATION RATE: 18 BRPM | DIASTOLIC BLOOD PRESSURE: 80 MMHG | HEART RATE: 90 BPM | TEMPERATURE: 98 F | BODY MASS INDEX: 39.23 KG/M2 | SYSTOLIC BLOOD PRESSURE: 130 MMHG | WEIGHT: 221.4 LBS

## 2021-01-11 DIAGNOSIS — E66.01 MORBIDLY OBESE (HCC): ICD-10-CM

## 2021-01-11 DIAGNOSIS — R01.1 HEART MURMUR: ICD-10-CM

## 2021-01-11 DIAGNOSIS — N32.81 OAB (OVERACTIVE BLADDER): ICD-10-CM

## 2021-01-11 DIAGNOSIS — F31.9 BIPOLAR 1 DISORDER (HCC): ICD-10-CM

## 2021-01-11 DIAGNOSIS — Z12.31 SCREENING MAMMOGRAM, ENCOUNTER FOR: ICD-10-CM

## 2021-01-11 DIAGNOSIS — Z23 INFLUENZA VACCINE NEEDED: ICD-10-CM

## 2021-01-11 DIAGNOSIS — F31.78 BIPOLAR DISORDER, IN FULL REMISSION, MOST RECENT EPISODE MIXED (HCC): ICD-10-CM

## 2021-01-11 DIAGNOSIS — N39.46 MIXED STRESS AND URGE URINARY INCONTINENCE: ICD-10-CM

## 2021-01-11 DIAGNOSIS — I10 HYPERTENSION, UNSPECIFIED TYPE: ICD-10-CM

## 2021-01-11 DIAGNOSIS — Z00.00 ANNUAL PHYSICAL EXAM: ICD-10-CM

## 2021-01-11 DIAGNOSIS — L81.9 DISCOLORED SKIN: ICD-10-CM

## 2021-01-11 DIAGNOSIS — E11.9 TYPE 2 DIABETES MELLITUS WITHOUT COMPLICATION, WITHOUT LONG-TERM CURRENT USE OF INSULIN (HCC): ICD-10-CM

## 2021-01-11 DIAGNOSIS — E78.2 MIXED HYPERLIPIDEMIA: ICD-10-CM

## 2021-01-11 DIAGNOSIS — B00.9 HERPES SIMPLEX TYPE 1 INFECTION: ICD-10-CM

## 2021-01-11 DIAGNOSIS — Z23 NEED FOR DIPHTHERIA-TETANUS-PERTUSSIS (TDAP) VACCINE: ICD-10-CM

## 2021-01-11 DIAGNOSIS — Z23 PNEUMOCOCCAL VACCINE ADMINISTERED: ICD-10-CM

## 2021-01-11 DIAGNOSIS — Z12.4 SCREENING FOR CERVICAL CANCER: ICD-10-CM

## 2021-01-11 DIAGNOSIS — Z12.83 SKIN CANCER SCREENING: Primary | ICD-10-CM

## 2021-01-11 PROBLEM — I42.9 CARDIOMYOPATHY: Status: ACTIVE | Noted: 2021-01-11

## 2021-01-11 LAB
BASOPHILS # BLD AUTO: 0.05 10*3/MM3 (ref 0–0.2)
BASOPHILS NFR BLD AUTO: 0.5 % (ref 0–1.5)
BILIRUB BLD-MCNC: NEGATIVE MG/DL
CLARITY, POC: CLEAR
COLOR UR: YELLOW
DEPRECATED RDW RBC AUTO: 42.4 FL (ref 37–54)
EOSINOPHIL # BLD AUTO: 0.23 10*3/MM3 (ref 0–0.4)
EOSINOPHIL NFR BLD AUTO: 2.5 % (ref 0.3–6.2)
ERYTHROCYTE [DISTWIDTH] IN BLOOD BY AUTOMATED COUNT: 13.2 % (ref 12.3–15.4)
EXPIRATION DATE: ABNORMAL
GLUCOSE UR STRIP-MCNC: NEGATIVE MG/DL
HCT VFR BLD AUTO: 47 % (ref 34–46.6)
HGB BLD-MCNC: 15.4 G/DL (ref 12–15.9)
IMM GRANULOCYTES # BLD AUTO: 0.03 10*3/MM3 (ref 0–0.05)
IMM GRANULOCYTES NFR BLD AUTO: 0.3 % (ref 0–0.5)
KETONES UR QL: NEGATIVE
LEUKOCYTE EST, POC: NEGATIVE
LYMPHOCYTES # BLD AUTO: 1.89 10*3/MM3 (ref 0.7–3.1)
LYMPHOCYTES NFR BLD AUTO: 20.5 % (ref 19.6–45.3)
Lab: 5042
MCH RBC QN AUTO: 29 PG (ref 26.6–33)
MCHC RBC AUTO-ENTMCNC: 32.8 G/DL (ref 31.5–35.7)
MCV RBC AUTO: 88.5 FL (ref 79–97)
MONOCYTES # BLD AUTO: 0.54 10*3/MM3 (ref 0.1–0.9)
MONOCYTES NFR BLD AUTO: 5.9 % (ref 5–12)
NEUTROPHILS NFR BLD AUTO: 6.47 10*3/MM3 (ref 1.7–7)
NEUTROPHILS NFR BLD AUTO: 70.3 % (ref 42.7–76)
NITRITE UR-MCNC: NEGATIVE MG/ML
NRBC BLD AUTO-RTO: 0 /100 WBC (ref 0–0.2)
PH UR: 7 [PH] (ref 5–8)
PLATELET # BLD AUTO: 276 10*3/MM3 (ref 140–450)
PMV BLD AUTO: 12.8 FL (ref 6–12)
PROT UR STRIP-MCNC: NEGATIVE MG/DL
RBC # BLD AUTO: 5.31 10*6/MM3 (ref 3.77–5.28)
RBC # UR STRIP: ABNORMAL /UL
SP GR UR: 1.01 (ref 1–1.03)
UROBILINOGEN UR QL: NORMAL
WBC # BLD AUTO: 9.21 10*3/MM3 (ref 3.4–10.8)

## 2021-01-11 PROCEDURE — 80178 ASSAY OF LITHIUM: CPT | Performed by: NURSE PRACTITIONER

## 2021-01-11 PROCEDURE — 85025 COMPLETE CBC W/AUTO DIFF WBC: CPT | Performed by: NURSE PRACTITIONER

## 2021-01-11 PROCEDURE — 84436 ASSAY OF TOTAL THYROXINE: CPT | Performed by: NURSE PRACTITIONER

## 2021-01-11 PROCEDURE — 90471 IMMUNIZATION ADMIN: CPT | Performed by: NURSE PRACTITIONER

## 2021-01-11 PROCEDURE — 82306 VITAMIN D 25 HYDROXY: CPT | Performed by: NURSE PRACTITIONER

## 2021-01-11 PROCEDURE — 90686 IIV4 VACC NO PRSV 0.5 ML IM: CPT | Performed by: NURSE PRACTITIONER

## 2021-01-11 PROCEDURE — 80053 COMPREHEN METABOLIC PANEL: CPT | Performed by: NURSE PRACTITIONER

## 2021-01-11 PROCEDURE — 90732 PPSV23 VACC 2 YRS+ SUBQ/IM: CPT | Performed by: NURSE PRACTITIONER

## 2021-01-11 PROCEDURE — 90472 IMMUNIZATION ADMIN EACH ADD: CPT | Performed by: NURSE PRACTITIONER

## 2021-01-11 PROCEDURE — 81003 URINALYSIS AUTO W/O SCOPE: CPT | Performed by: NURSE PRACTITIONER

## 2021-01-11 PROCEDURE — 84443 ASSAY THYROID STIM HORMONE: CPT | Performed by: NURSE PRACTITIONER

## 2021-01-11 PROCEDURE — 80061 LIPID PANEL: CPT | Performed by: NURSE PRACTITIONER

## 2021-01-11 PROCEDURE — 99396 PREV VISIT EST AGE 40-64: CPT | Performed by: NURSE PRACTITIONER

## 2021-01-11 PROCEDURE — 90715 TDAP VACCINE 7 YRS/> IM: CPT | Performed by: NURSE PRACTITIONER

## 2021-01-11 RX ORDER — ENALAPRIL MALEATE 5 MG/1
5 TABLET ORAL DAILY
Qty: 90 TABLET | Refills: 1 | Status: SHIPPED | OUTPATIENT
Start: 2021-01-11 | End: 2021-06-05 | Stop reason: SDUPTHER

## 2021-01-11 RX ORDER — FAMCICLOVIR 500 MG/1
500 TABLET ORAL 3 TIMES DAILY
Qty: 15 TABLET | Refills: 3 | Status: SHIPPED | OUTPATIENT
Start: 2021-01-11

## 2021-01-11 RX ORDER — PRAVASTATIN SODIUM 40 MG
40 TABLET ORAL DAILY
Qty: 90 TABLET | Refills: 1 | Status: SHIPPED | OUTPATIENT
Start: 2021-01-11 | End: 2021-02-24

## 2021-01-11 NOTE — PROGRESS NOTES
"Subjective   Patricia Brown is a 50 y.o. female.   Chief Complaint   Patient presents with   • Annual Exam      History of Present Illness   Patient is a 50-year-old white female.  She is here for annual exam.  She is fasting for labs.    She does have some concerns.  \"I have a dropped bladder \"She is reporting some urinary urgency frequency and stress incontinence.   She will would like a referral to see GYN for possible surgery.      She is also complaining of a pulled muscle to her right back.  She has been stretching started 3 weeks ago,   waxes and wanes.  Right side of back. Taking over the counter medications with mild relief.         She sees her psychiatrist Leona Kraus on a regular basis.   Sees Callie Ramirez for endocrinology.       The following portions of the patient's history were reviewed and updated as appropriate: allergies, current medications, past family history, past medical history, past social history, past surgical history and problem list.    Review of Systems   Constitutional: Negative.    HENT: Positive for mouth sores.    Eyes: Negative.    Cardiovascular: Negative.    Genitourinary: Positive for frequency and urgency.        Stress incontinence   Musculoskeletal: Positive for back pain.   Skin: Positive for color change.        Right forearm with a patch of discolored skin. Has been there for several years.   No itching.    Herpes 1 lesion to lower lip.  Appears to be healing.  She is requesting Valtrex.      Allergic/Immunologic: Negative.    Neurological: Negative.    Hematological: Negative.    Psychiatric/Behavioral: The patient is nervous/anxious.      Past Surgical History:   Procedure Laterality Date   • BREAST BIOPSY Right    • BREAST BIOPSY Right    • CERVIX SURGERY      cervical scraping for cancer     Past Medical History:   Diagnosis Date   • Bipolar 1 disorder (CMS/HCC)    • Cancer (CMS/[facility])     hx cervical cancer -    • Diabetes mellitus (CMS/HCC)    • Fibrocystic breast  "   • Heart murmur    • Hyperlipidemia    • Hypertension    • Obesity    • Visual impairment     glasses        Current Outpatient Medications:   •  buPROPion XL (WELLBUTRIN XL) 150 MG 24 hr tablet, Take 150 mg by mouth Daily., Disp: , Rfl:   •  Cariprazine HCl (VRAYLAR) 4.5 MG capsule, Take  by mouth Daily., Disp: , Rfl:   •  clonazePAM (KlonoPIN) 0.5 MG tablet, Take 0.5 mg by mouth 3 (Three) Times a Day As Needed for Seizures., Disp: , Rfl:   •  enalapril (VASOTEC) 5 MG tablet, Take 1 tablet by mouth Daily. Will need to be seen for another refill, Disp: 90 tablet, Rfl: 1  •  glucose blood test strip, 1 each by Other route 3 (Three) Times a Day As Needed (three times a day and prn). Use as instructed, Disp: 120 each, Rfl: 12  •  Insulin Glargine-Lixisenatide (Soliqua) 100-33 UNT-MCG/ML solution pen-injector injection, INJECT SUBCUTANEOUSLY UP TO 60 UNITS DAILY BEFORE FIRST MEAL OF THE DAY AS DIRECTED, Disp: 54 mL, Rfl: 0  •  Insulin Lispro, 1 Unit Dial, (HumaLOG KwikPen) 100 UNIT/ML solution pen-injector, take 12 units before meals plus CF 1:50 >150, TDD 50 units, Disp: 15 pen, Rfl: 1  •  Insulin Pen Needle 29G X 5MM misc, 1 pen 4 (Four) Times a Day., Disp: 450 each, Rfl: 3  •  Lancets (FREESTYLE) lancets, As previously directed, Disp: 60 each, Rfl: 0  •  lithium carbonate 300 MG capsule, Take 300 mg by mouth 2 (Two) Times a Day With Meals. 2 in am, 3 at night , Disp: , Rfl:   •  pravastatin (PRAVACHOL) 40 MG tablet, Take 1 tablet by mouth Daily., Disp: 90 tablet, Rfl: 1  •  topiramate (TOPAMAX) 100 MG tablet, Take 100 mg by mouth 2 (Two) Times a Day., Disp: , Rfl:   •  famciclovir (FAMVIR) 500 MG tablet, Take 1 tablet by mouth 3 (Three) Times a Day., Disp: 15 tablet, Rfl: 3  •  mupirocin (Bactroban) 2 % ointment, Apply  topically to the appropriate area as directed 3 (Three) Times a Day., Disp: 1 each, Rfl: 0  •  triamcinolone (KENALOG) 0.025 % ointment, Apply  topically to the appropriate area as directed 2 (Two)  "Times a Day., Disp: 1 tube, Rfl: 0     Objective   No Known Allergies  Visit Vitals  /80   Pulse 90   Temp 98 °F (36.7 °C) (Temporal)   Resp 18   Ht 160 cm (62.99\")   Wt 100 kg (221 lb 6.4 oz)   LMP 01/11/2021   SpO2 98%   BMI 39.23 kg/m²       Physical Exam  Vitals signs reviewed.   Constitutional:       General: She is not in acute distress.     Appearance: Normal appearance. She is obese. She is not ill-appearing, toxic-appearing or diaphoretic.   Neck:      Musculoskeletal: Normal range of motion.   Cardiovascular:      Rate and Rhythm: Normal rate.      Pulses: Normal pulses.      Heart sounds: Murmur present.   Pulmonary:      Effort: Pulmonary effort is normal.      Breath sounds: Normal breath sounds.   Musculoskeletal:      Right lower leg: No edema.      Left lower leg: No edema.   Skin:            Comments: Light discolor patch of skin, the texture is rough.    Neurological:      Mental Status: She is alert.   Psychiatric:         Attention and Perception: Attention normal.         Mood and Affect: Mood normal.         Behavior: Behavior is cooperative.         Thought Content: Thought content normal.         Cognition and Memory: Cognition normal.         Assessment/Plan   Diagnoses and all orders for this visit:    1. Skin cancer screening (Primary)  -     Ambulatory Referral to Dermatology    2. Discolored skin  -     Ambulatory Referral to Dermatology    3. OAB (overactive bladder)  -     Ambulatory Referral to Gynecology    4. Mixed stress and urge urinary incontinence  -     Ambulatory Referral to Gynecology    5. Screening for cervical cancer  -     Ambulatory Referral to Gynecology    6. Herpes simplex type 1 infection  -     famciclovir (FAMVIR) 500 MG tablet; Take 1 tablet by mouth 3 (Three) Times a Day.  Dispense: 15 tablet; Refill: 3    7. Hypertension, unspecified type  -     enalapril (VASOTEC) 5 MG tablet; Take 1 tablet by mouth Daily. Will need to be seen for another refill  " Dispense: 90 tablet; Refill: 1    8. Type 2 diabetes mellitus without complication, without long-term current use of insulin (CMS/[facility])  -     Ambulatory Referral for Diabetic Eye Exam-Ophthalmology    9. Mixed hyperlipidemia  -     pravastatin (PRAVACHOL) 40 MG tablet; Take 1 tablet by mouth Daily.  Dispense: 90 tablet; Refill: 1    10. Bipolar disorder, in full remission, most recent episode mixed (CMS/[facility])  -     Lithium level    11. Annual physical exam  -     CBC & Differential  -     Comprehensive Metabolic Panel  -     Lipid Panel  -     POC Urinalysis Dipstick, Automated  -     TSH  -     Vitamin D 25 Hydroxy  -     T4  -     CBC Auto Differential    12. Influenza vaccine needed  -     Fluarix/Fluzone/Afluria Quad>6 Months    13. Screening mammogram, encounter for  -     Mammo Screening Digital Tomosynthesis Bilateral With CAD; Future    14. Need for diphtheria-tetanus-pertussis (Tdap) vaccine  -     Tdap Vaccine Greater Than or Equal To 6yo IM    15. Pneumococcal vaccine administered  -     pneumococcal polysaccharide 23-valent (PNEUMOVAX-23) vaccine 0.5 mL    16. Morbidly obese (CMS/[facility])    17. Heart murmur    18. Bipolar 1 disorder (CMS/[facility])    patient declines any prescription medication for her back at this time.   It is improving.     Patient Counseling:  --Nutrition: Stressed importance of moderation in sodium/caffeine intake, saturated fat and cholesterol, caloric balance, sufficient intake of fresh fruits, vegetables, fiber, calcium, iron,   --Exercise: Stressed the importance of regular exercise.   --Substance Abuse: Discussed cessation/primary prevention of tobacco, alcohol, or other drug use; driving or other dangerous activities under the influence; availability of treatment for abuse.    --Sexuality: Discussed sexually transmitted diseases, partner selection, use of condoms, avoidance of unintended pregnancy and contraceptive alternatives.   --Injury prevention: Discussed safety belts, safety  helmets, smoke detector, smoking near bedding or upholstery.   --Dental health: Discussed importance of regular tooth brushing, flossing, and dental visits.  --Immunizations reviewed.  -discussed with patient    Discussed the nature of the medical condition(s) risks, complications, implications, and management, safe and proper use of medications. Encouraged medication compliance, and keeping scheduled follow up appointments with me and any other providers.                 Patient Instructions   Preventive Care 40-64 Years Old, Female  Preventive care refers to visits with your health care provider and lifestyle choices that can promote health and wellness. This includes:  · A yearly physical exam. This may also be called an annual well check.  · Regular dental visits and eye exams.  · Immunizations.  · Screening for certain conditions.  · Healthy lifestyle choices, such as eating a healthy diet, getting regular exercise, not using drugs or products that contain nicotine and tobacco, and limiting alcohol use.  What can I expect for my preventive care visit?  Physical exam  Your health care provider will check your:  · Height and weight. This may be used to calculate body mass index (BMI), which tells if you are at a healthy weight.  · Heart rate and blood pressure.  · Skin for abnormal spots.  Counseling  Your health care provider may ask you questions about your:  · Alcohol, tobacco, and drug use.  · Emotional well-being.  · Home and relationship well-being.  · Sexual activity.  · Eating habits.  · Work and work environment.  · Method of birth control.  · Menstrual cycle.  · Pregnancy history.  What immunizations do I need?    Influenza (flu) vaccine  · This is recommended every year.  Tetanus, diphtheria, and pertussis (Tdap) vaccine  · You may need a Td booster every 10 years.  Varicella (chickenpox) vaccine  · You may need this if you have not been vaccinated.  Zoster (shingles) vaccine  · You may need this after  age 60.  Measles, mumps, and rubella (MMR) vaccine  · You may need at least one dose of MMR if you were born in 1957 or later. You may also need a second dose.  Pneumococcal conjugate (PCV13) vaccine  · You may need this if you have certain conditions and were not previously vaccinated.  Pneumococcal polysaccharide (PPSV23) vaccine  · You may need one or two doses if you smoke cigarettes or if you have certain conditions.  Meningococcal conjugate (MenACWY) vaccine  · You may need this if you have certain conditions.  Hepatitis A vaccine  · You may need this if you have certain conditions or if you travel or work in places where you may be exposed to hepatitis A.  Hepatitis B vaccine  · You may need this if you have certain conditions or if you travel or work in places where you may be exposed to hepatitis B.  Haemophilus influenzae type b (Hib) vaccine  · You may need this if you have certain conditions.  Human papillomavirus (HPV) vaccine  · If recommended by your health care provider, you may need three doses over 6 months.  You may receive vaccines as individual doses or as more than one vaccine together in one shot (combination vaccines). Talk with your health care provider about the risks and benefits of combination vaccines.  What tests do I need?  Blood tests  · Lipid and cholesterol levels. These may be checked every 5 years, or more frequently if you are over 50 years old.  · Hepatitis C test.  · Hepatitis B test.  Screening  · Lung cancer screening. You may have this screening every year starting at age 55 if you have a 30-pack-year history of smoking and currently smoke or have quit within the past 15 years.  · Colorectal cancer screening. All adults should have this screening starting at age 50 and continuing until age 75. Your health care provider may recommend screening at age 45 if you are at increased risk. You will have tests every 1-10 years, depending on your results and the type of screening  test.  · Diabetes screening. This is done by checking your blood sugar (glucose) after you have not eaten for a while (fasting). You may have this done every 1-3 years.  · Mammogram. This may be done every 1-2 years. Talk with your health care provider about when you should start having regular mammograms. This may depend on whether you have a family history of breast cancer.  · BRCA-related cancer screening. This may be done if you have a family history of breast, ovarian, tubal, or peritoneal cancers.  · Pelvic exam and Pap test. This may be done every 3 years starting at age 21. Starting at age 30, this may be done every 5 years if you have a Pap test in combination with an HPV test.  Other tests  · Sexually transmitted disease (STD) testing.  · Bone density scan. This is done to screen for osteoporosis. You may have this scan if you are at high risk for osteoporosis.  Follow these instructions at home:  Eating and drinking  · Eat a diet that includes fresh fruits and vegetables, whole grains, lean protein, and low-fat dairy.  · Take vitamin and mineral supplements as recommended by your health care provider.  · Do not drink alcohol if:  ? Your health care provider tells you not to drink.  ? You are pregnant, may be pregnant, or are planning to become pregnant.  · If you drink alcohol:  ? Limit how much you have to 0-1 drink a day.  ? Be aware of how much alcohol is in your drink. In the [place] one drink equals one 12 oz bottle of beer (355 mL), one 5 oz glass of wine (148 mL), or one 1½ oz glass of hard liquor (44 mL).  Lifestyle  · Take daily care of your teeth and gums.  · Stay active. Exercise for at least 30 minutes on 5 or more days each week.  · Do not use any products that contain nicotine or tobacco, such as cigarettes, e-cigarettes, and chewing tobacco. If you need help quitting, ask your health care provider.  · If you are sexually active, practice safe sex. Use a condom or other form of birth control  (contraception) in order to prevent pregnancy and STIs (sexually transmitted infections).  · If told by your health care provider, take low-dose aspirin daily starting at age 50.  What's next?  · Visit your health care provider once a year for a well check visit.  · Ask your health care provider how often you should have your eyes and teeth checked.  · Stay up to date on all vaccines.  This information is not intended to replace advice given to you by your health care provider. Make sure you discuss any questions you have with your health care provider.  Document Revised: 08/29/2019 Document Reviewed: 08/29/2019  Elsevier Patient Education © 2020 [employer]        Mariela Hall, APRN

## 2021-01-12 LAB
25(OH)D3 SERPL-MCNC: 30.1 NG/ML (ref 30–100)
ALBUMIN SERPL-MCNC: 4.6 G/DL (ref 3.5–5.2)
ALBUMIN/GLOB SERPL: 1.4 G/DL
ALP SERPL-CCNC: 100 U/L (ref 39–117)
ALT SERPL W P-5'-P-CCNC: 46 U/L (ref 1–33)
ANION GAP SERPL CALCULATED.3IONS-SCNC: 12.6 MMOL/L (ref 5–15)
AST SERPL-CCNC: 96 U/L (ref 1–32)
BILIRUB SERPL-MCNC: 0.5 MG/DL (ref 0–1.2)
BUN SERPL-MCNC: 11 MG/DL (ref 6–20)
BUN/CREAT SERPL: 13.6 (ref 7–25)
CALCIUM SPEC-SCNC: 9.6 MG/DL (ref 8.6–10.5)
CHLORIDE SERPL-SCNC: 103 MMOL/L (ref 98–107)
CHOLEST SERPL-MCNC: 194 MG/DL (ref 0–200)
CO2 SERPL-SCNC: 23.4 MMOL/L (ref 22–29)
CREAT SERPL-MCNC: 0.81 MG/DL (ref 0.57–1)
GFR SERPL CREATININE-BSD FRML MDRD: 75 ML/MIN/1.73
GLOBULIN UR ELPH-MCNC: 3.2 GM/DL
GLUCOSE SERPL-MCNC: 186 MG/DL (ref 65–99)
HDLC SERPL-MCNC: 60 MG/DL (ref 40–60)
LDLC SERPL CALC-MCNC: 104 MG/DL (ref 0–100)
LDLC/HDLC SERPL: 1.66 {RATIO}
LITHIUM SERPL-SCNC: 0.5 MMOL/L (ref 0.6–1.2)
POTASSIUM SERPL-SCNC: 4.5 MMOL/L (ref 3.5–5.2)
PROT SERPL-MCNC: 7.8 G/DL (ref 6–8.5)
SODIUM SERPL-SCNC: 139 MMOL/L (ref 136–145)
T4 SERPL-MCNC: 7.86 MCG/DL (ref 4.5–11.7)
TRIGL SERPL-MCNC: 172 MG/DL (ref 0–150)
TSH SERPL DL<=0.05 MIU/L-ACNC: 1.27 UIU/ML (ref 0.27–4.2)
VLDLC SERPL-MCNC: 30 MG/DL (ref 5–40)

## 2021-01-14 ENCOUNTER — APPOINTMENT (OUTPATIENT)
Dept: OTHER | Facility: HOSPITAL | Age: 51
End: 2021-01-14

## 2021-01-14 ENCOUNTER — HOSPITAL ENCOUNTER (OUTPATIENT)
Dept: MAMMOGRAPHY | Facility: HOSPITAL | Age: 51
Discharge: HOME | End: 2021-01-14
Admitting: NURSE PRACTITIONER

## 2021-01-14 DIAGNOSIS — Z12.31 SCREENING MAMMOGRAM, ENCOUNTER FOR: ICD-10-CM

## 2021-01-14 PROCEDURE — 77067 SCR MAMMO BI INCL CAD: CPT | Performed by: RADIOLOGY

## 2021-01-14 PROCEDURE — 77063 BREAST TOMOSYNTHESIS BI: CPT | Performed by: RADIOLOGY

## 2021-01-14 PROCEDURE — 77067 SCR MAMMO BI INCL CAD: CPT

## 2021-01-14 PROCEDURE — 77063 BREAST TOMOSYNTHESIS BI: CPT

## 2021-01-19 RX ORDER — INSULIN LISPRO 100 [IU]/ML
INJECTION, SOLUTION INTRAVENOUS; SUBCUTANEOUS
Qty: 45 ML | Refills: 0 | Status: SHIPPED | OUTPATIENT
Start: 2021-01-19 | End: 2021-04-05

## 2021-01-20 DIAGNOSIS — E11.65 UNCONTROLLED TYPE 2 DIABETES MELLITUS WITH HYPERGLYCEMIA (HCC): ICD-10-CM

## 2021-01-20 RX ORDER — INSULIN GLARGINE AND LIXISENATIDE 100; 33 U/ML; UG/ML
INJECTION, SOLUTION SUBCUTANEOUS
Qty: 54 ML | Refills: 0 | Status: SHIPPED | OUTPATIENT
Start: 2021-01-20 | End: 2021-03-09 | Stop reason: SDUPTHER

## 2021-01-26 ENCOUNTER — OFFICE VISIT (OUTPATIENT)
Dept: OBSTETRICS AND GYNECOLOGY | Facility: CLINIC | Age: 51
End: 2021-01-26

## 2021-01-26 VITALS
HEIGHT: 63 IN | SYSTOLIC BLOOD PRESSURE: 118 MMHG | DIASTOLIC BLOOD PRESSURE: 80 MMHG | WEIGHT: 223 LBS | BODY MASS INDEX: 39.51 KG/M2

## 2021-01-26 DIAGNOSIS — Z01.419 ENCOUNTER FOR WELL WOMAN EXAM WITH ROUTINE GYNECOLOGICAL EXAM: Primary | ICD-10-CM

## 2021-01-26 PROCEDURE — 99386 PREV VISIT NEW AGE 40-64: CPT | Performed by: OBSTETRICS & GYNECOLOGY

## 2021-01-26 RX ORDER — CLONAZEPAM 1 MG/1
TABLET ORAL
COMMUNITY
Start: 2021-01-18

## 2021-01-26 NOTE — PROGRESS NOTES
Subjective   Chief Complaint   Patient presents with   • Annual Exam     Patricia Brown is a 50 y.o. year old  presenting to be seen for her annual exam.    Current birth control method: Withdrawal .  Last mammogram 2 weeks ago last Pap smear about 5 or 6 years ago.  Reports occasional some heavy days.  Discussed options such as potentially an IUD however her daughter has 1 and has some bleeding issues.  I was wondering what type it might be ParaGard etc. or Mirena in a nulliparous patient.    Patient's last menstrual period was 2021.    She is sexually active.   Condoms are not typically used.   27 years.  STDs and sexual behavior discussed.  [place] is painful or she is having problems :yes  She has concerns about domestic violence: no.    Cycle Frequency: vary between 5 and 30 days   Menstrual cycle character: flow has been heavy some months and light other months   Cycle Duration: 3 - 5   Number of heavy days of flows: 2   Intermenstrual bleeding present: no   Post-coital bleeding present: no     She exercises regularly: no.  Discussed maintaining healthy weight and nutrition and injury avoidance  Self breast awareness:yes    Calcium intake: is not adequate.1  Caffeine intake: no or mild caffeine use  Social History    Tobacco Use      Smoking status: Former Smoker        Quit date:         Years since quittin.0      Smokeless tobacco: Never Used    Social History     Substance and Sexual Activity   Alcohol Use Not Currently      Discussed avoidance of illicit drugs    The following portions of the patient's history were reviewed and updated as is  appropriate:She  has a past medical history of Bipolar 1 disorder (CMS/HCC), Cervical dysplasia, Diabetes mellitus (CMS/HCC), Fibrocystic breast, Heart murmur, Hyperlipidemia, Hypertension, Obesity, and Visual impairment.  She does not have any pertinent problems on file.  She  has a past surgical history that includes Breast biopsy  (Right); Cervix lesion destruction (1992); cervical conization, leep (1994); and cervical conization, leeanna (2005).  Her family history includes Diabetes in her father and mother; Heart disease in her mother; Obesity in her mother.  She  reports that she quit smoking about 16 years ago. She has never used smokeless tobacco. She reports previous alcohol use. She reports that she does not use drugs.  She has No Known Allergies..    Current Outpatient Medications:   •  buPROPion XL (WELLBUTRIN XL) 150 MG 24 hr tablet, Take 150 mg by mouth Daily., Disp: , Rfl:   •  Cariprazine HCl (VRAYLAR) 4.5 MG capsule, Take  by mouth Daily., Disp: , Rfl:   •  clonazePAM (KlonoPIN) 1 MG tablet, , Disp: , Rfl:   •  enalapril (VASOTEC) 5 MG tablet, Take 1 tablet by mouth Daily. Will need to be seen for another refill, Disp: 90 tablet, Rfl: 1  •  famciclovir (FAMVIR) 500 MG tablet, Take 1 tablet by mouth 3 (Three) Times a Day., Disp: 15 tablet, Rfl: 3  •  glucose blood test strip, 1 each by Other route 3 (Three) Times a Day As Needed (three times a day and prn). Use as instructed, Disp: 120 each, Rfl: 12  •  Insulin Glargine-Lixisenatide (Soliqua) 100-33 UNT-MCG/ML solution pen-injector injection, INJECT SUBCUTANEOUSLY UP TO 60 UNITS DAILY BEFORE FIRST MEAL OF THE DAY AS DIRECTED, Disp: 54 mL, Rfl: 0  •  Insulin Lispro, 1 Unit Dial, (HumaLOG KwikPen) 100 UNIT/ML solution pen-injector, take 12 units plus correction 1:50 for BG >150 15 minutes before meals, TDD 50 units, Disp: 45 mL, Rfl: 0  •  Insulin Pen Needle 29G X 5MM misc, 1 pen 4 (Four) Times a Day., Disp: 450 each, Rfl: 0  •  Lancets (FREESTYLE) lancets, As previously directed, Disp: 60 each, Rfl: 0  •  lithium carbonate 300 MG capsule, Take 300 mg by mouth 2 (Two) Times a Day With Meals. 2 in am, 3 at night , Disp: , Rfl:   •  pravastatin (PRAVACHOL) 40 MG tablet, Take 1 tablet by mouth Daily., Disp: 90 tablet, Rfl: 1  •  topiramate (TOPAMAX) 100 MG tablet, Take 100 mg by mouth 2  "(Two) Times a Day., Disp: , Rfl:     Discussed risk factors for hypertension, hyperlipidemia coronary heart disease.    Review of systems    Constitutional    POS Thirsty                            NEG anorexia, fatigue, fevers or night sweats  Breast                POS nothing reported                            NEG persistent breast lump, skin dimpling or nipple discharge  GI                      POS nothing reported                            NEG bloating, change in bowel habits, melena or reflux symptoms                       POS DEEPALI is present but it IS NOT effecting her ADL's                            NEG dysuria, frequency or hematuria  Medical illness positive diabetes high cholesterol depression.  No seizures asthma hypertension thyroid or arthritis.  GYN questionnaire is positive for abnormal Pap smears.  No abuse cyst fibroids STD or endometriosis.  Social history she is  former smoker does not drink alcohol currently no illegal drugs.       Objective   /80   Ht 160 cm (63\")   Wt 101 kg (223 lb)   LMP 01/11/2021 Comment: PT DENIES PREGNANCY  Breastfeeding No   BMI 39.50 kg/m²       General:  well developed; well nourished  no acute distress  appears stated age  obese - Body mass index is 39.5 kg/m².   Skin:  No suspicious lesions seen   Thyroid:    Breasts:  Examined in supine position  Symmetric without masses or skin dimpling  Nipples normal without inversion, lesions or discharge  Fibrocystic changes are present both breasts without a discrete mass   Abdomen: no umbilical or inguinal hernias are present  no hepato-splenomegaly  Soft slightly tender suprapubically no CVAT   Pelvis: Clinical staff was present for exam  External genitalia:  normal appearance of the external genitalia including Bartholin's and Broadwell's glands.  :  urethral meatus normal;  Vaginal:  normal pink mucosa without prolapse or lesions. discharge present -  white and thick; she is able to perform a Kegel " contraction upon request;  Cervix:  Pap obtained irregular  Uterus:  normal size, shape and consistency. Difficult to palpate entirely due to body habitus  Adnexa:  non palpable bilaterally.  Rectal:  digital rectal exam not performed; anus visually normal appearing.  Cystocele GRADE 1-2 worse with standing       Lab Review   CBC, CMP, LIPIDS, TSH, UA and Vitamin D    Imaging  No data reviewed               Assessment     1. GYN examination with history of cervical dysplasia status post cryo and what sounds like 2 LEEP procedures where she was put to sleep.  These were done in [place].  Would like to get the results of last Pap smear 5 to 6 years ago and the pathology report.  2. Stress urinary incontinence for about 6 years or more more with slight cystocele she reported her gynecologist told her about 6 years ago she has failed Kegel's despite being able to do a strong Kegel here in the office.  She is not having to wear a daily pad but does have as needed.  She also leaks when she is getting up and down in addition to coughing laughing sneezing.  Kegels have not been that helpful.  Options discussed  3. Dyspareunia she relates to cystocele which is not that significant on examination.  4. She had a recent mammogram but results were not in epic but I can obtain.  5. Colonoscopy discussed.                   Plan     1. Would continue timed voiding and Kegel's with cough laughing sneezing etc. will provide her some information regarding anterior repair along with TVT or sling procedure.  I would want her to demonstrate ability to do self-catheterization here in the office prior to surgery in the event that she had some urinary retention.  2. We will get her sign release of information to find out results of Pap testing and pathology.  Apparently her gynecologist passed away unexpectedly she saw his partner 1 time but did not go back due to potential personality disagreements.  3. 1000 mg calcium in divided doses  ideally in diet; regular exercise  4. Self breast awareness discussed on day 6 or 7.  4.                No orders of the defined types were placed in this encounter.    No orders of the defined types were placed in this encounter.          This note was electronically signed.    Delta Mahajan MD  1/26/2021

## 2021-01-28 PROBLEM — R87.613 HIGH GRADE INTREPITH LESION CYTO SMR CRVX (HGSIL): Status: ACTIVE | Noted: 2021-01-28

## 2021-02-09 ENCOUNTER — DOCUMENTATION (OUTPATIENT)
Dept: OBSTETRICS AND GYNECOLOGY | Facility: CLINIC | Age: 51
End: 2021-02-09

## 2021-02-09 ENCOUNTER — TELEPHONE (OUTPATIENT)
Dept: FAMILY MEDICINE CLINIC | Facility: CLINIC | Age: 51
End: 2021-02-09

## 2021-02-09 NOTE — PROGRESS NOTES
Results review  I received records from November 14, 1996.  She had a LEEP procedure done and the pathology revealed mild dysplasia.  October 24, 2017 she had a colposcopically directed biopsy done that showed mild dysplasia at 12:00.  Chlamydia, gonorrhea were negative as were type XVI and XVIII HPV.

## 2021-02-09 NOTE — TELEPHONE ENCOUNTER
Patient's daughter who is in their 20's is going to visit their grandmother who has shingles.  Patient's daughter has never had chicken pox but has had the vaccination.   Patient would like to know if her daughter would be at risk for catching shingles and if it is contagious.  Please advise

## 2021-02-24 ENCOUNTER — OFFICE VISIT (OUTPATIENT)
Dept: ENDOCRINOLOGY | Facility: CLINIC | Age: 51
End: 2021-02-24

## 2021-02-24 VITALS
TEMPERATURE: 97.3 F | DIASTOLIC BLOOD PRESSURE: 72 MMHG | BODY MASS INDEX: 40.11 KG/M2 | SYSTOLIC BLOOD PRESSURE: 130 MMHG | HEIGHT: 63 IN | WEIGHT: 226.4 LBS

## 2021-02-24 DIAGNOSIS — IMO0002 DIABETES MELLITUS TYPE 2, UNCONTROLLED, WITH COMPLICATIONS: Primary | ICD-10-CM

## 2021-02-24 DIAGNOSIS — E78.2 MIXED HYPERLIPIDEMIA: ICD-10-CM

## 2021-02-24 DIAGNOSIS — E66.01 CLASS 3 SEVERE OBESITY DUE TO EXCESS CALORIES WITH SERIOUS COMORBIDITY AND BODY MASS INDEX (BMI) OF 40.0 TO 44.9 IN ADULT (HCC): ICD-10-CM

## 2021-02-24 LAB
EXPIRATION DATE: NORMAL
HBA1C MFR BLD: 8.5 %
Lab: NORMAL

## 2021-02-24 PROCEDURE — 99214 OFFICE O/P EST MOD 30 MIN: CPT | Performed by: INTERNAL MEDICINE

## 2021-02-24 PROCEDURE — 83036 HEMOGLOBIN GLYCOSYLATED A1C: CPT | Performed by: INTERNAL MEDICINE

## 2021-02-24 RX ORDER — EMPAGLIFLOZIN 25 MG/1
1 TABLET, FILM COATED ORAL DAILY
Qty: 90 TABLET | Refills: 1 | Status: SHIPPED | OUTPATIENT
Start: 2021-02-24 | End: 2021-05-24 | Stop reason: SDUPTHER

## 2021-02-24 RX ORDER — ATORVASTATIN CALCIUM 40 MG/1
40 TABLET, FILM COATED ORAL DAILY
Qty: 90 TABLET | Refills: 1 | Status: SHIPPED | OUTPATIENT
Start: 2021-02-24 | End: 2021-05-24 | Stop reason: SDUPTHER

## 2021-02-24 NOTE — PROGRESS NOTES
"Chief Complaint   Patient presents with   • Follow-up   • Diabetes          HPI   Patricia Brown is a 50 y.o. female had concerns including Follow-up and Diabetes.    She is checking blood sugars 3 times per day. BGs are running 200-220 in the mornings, eats breakfast around 11 AM, BGs run 120s-170s through the day. Snacks on fruit through the day. Tries to pair fruit with protein.   Has polyuria. Drinks diet cranberry juice.   Current medications for diabetes include Soliqua 60 units once daily, Humalog 12 units with meals +1: 50 greater than 150.    Weight is up since last fall.  She lost her job working from home and has been stress eating some.    Takes pravastatin 40 mg daily for cholesterol.    LFTs are elevated.  She denies alcohol use.  Has been told in the past she has fatty liver.    The following portions of the patient's history were reviewed and updated as appropriate: allergies, current medications, past family history, past medical history, past social history, past surgical history and problem list.      Review of Systems   Endocrine:        See HPI   Psychiatric/Behavioral: Positive for depressed mood.        Physical Exam  Vitals signs reviewed.   Constitutional:       Appearance: Normal appearance. She is obese.   Pulmonary:      Effort: Pulmonary effort is normal.   Neurological:      Mental Status: She is alert.   Psychiatric:         Mood and Affect: Mood normal.         Behavior: Behavior normal.        /72   Temp 97.3 °F (36.3 °C) (Infrared)   Ht 160 cm (63\")   Wt 103 kg (226 lb 6.4 oz)   BMI 40.10 kg/m²      Labs and imaging    CMP:  Lab Results   Component Value Date    BUN 11 01/11/2021    CREATININE 0.81 01/11/2021    EGFRIFNONA 75 01/11/2021    BCR 13.6 01/11/2021     01/11/2021    K 4.5 01/11/2021    CO2 23.4 01/11/2021    CALCIUM 9.6 01/11/2021    ALBUMIN 4.60 01/11/2021    BILITOT 0.5 01/11/2021    ALKPHOS 100 01/11/2021    AST 96 (H) 01/11/2021    ALT 46 (H) " 01/11/2021     Lipid Panel:  Lab Results   Component Value Date    CHOL 194 01/11/2021    TRIG 172 (H) 01/11/2021    HDL 60 01/11/2021    VLDL 30 01/11/2021     (H) 01/11/2021     HbA1c:  Lab Results   Component Value Date    HGBA1C 8.5 02/24/2021    HGBA1C 9.7 09/15/2020     Glucose:    Lab Results   Component Value Date    POCGLU 275 (A) 09/15/2020     Microalbumin:  Lab Results   Component Value Date    MALBCRERATIO  09/15/2020      Comment:      Unable to calculate     TSH:  Lab Results   Component Value Date    TSH 1.270 01/11/2021       Assessment and plan  Diagnoses and all orders for this visit:    1. Diabetes mellitus type 2, uncontrolled, with complications (CMS/[facility]) (Primary)  Uncontrolled but improved with A1c down to 8.5 from 9.7.  Complicated by hyperglycemia.  No metformin due to history of GI intolerance.  Continue Soliqua 60 units once daily.  Continue Humalog 12 units 3 times daily +1: 50 greater than 150.  Add Jardiance.  Cautioned regarding possible UTI/yeast infection or increased urinary frequency.  Consider the addition of Actos due to persistent hyperglycemia and fatty liver.  Call the office if BG's are persistently greater than 200.  Labs up-to-date with urine microalbumin normal from September 2020, TSH, lipid, CMP from January 2021.  Ophtho exam is pending.  Monofilament was updated July 2020.  -     POC Glycosylated Hemoglobin (Hb A1C)  -     Empagliflozin (Jardiance) 25 MG tablet; Take 25 mg by mouth Daily.  Dispense: 90 tablet; Refill: 1    2. Mixed hyperlipidemia  LDL above goal when checked last in January.  Discontinue pravastatin and transition to atorvastatin 40 mg daily.  Recheck CMP and lipid in 3 months.  -     atorvastatin (Lipitor) 40 MG tablet; Take 1 tablet by mouth Daily.  Dispense: 90 tablet; Refill: 1    3. Class 3 severe obesity due to excess calories with serious comorbidity and body mass index (BMI) of 40.0 to 44.9 in adult (CMS/[facility])  BMI 40.1.  Weight loss is  necessary for overall health and diabetes management.  Hopefully the addition of Jardiance will aid in weight loss.  Discussed dietary modifications.       Return in about 3 months (around 5/24/2021) for next scheduled follow up. The patient was instructed to contact the clinic with any interval questions or concerns.    Callie Ramirez, DO   Endocrinologist    Please note that portions of this document were completed with a voice recognition program. Efforts were made to edit the dictations, but occasionally words are mis-transcribed.

## 2021-02-26 ENCOUNTER — TELEPHONE (OUTPATIENT)
Dept: ENDOCRINOLOGY | Facility: CLINIC | Age: 51
End: 2021-02-26

## 2021-02-26 NOTE — TELEPHONE ENCOUNTER
Approvedon February 25  Request Reference Number: [id number]. JARDIANCE TAB 25MG is approved through 02/25/2022. Your patient may now fill this prescription and it will be covered.  Drug  Jardiance 25MG tablets  Form  [facility] Electronic Prior Authorization Form (2017 [employer]CP[employer])

## 2021-03-09 DIAGNOSIS — E11.65 UNCONTROLLED TYPE 2 DIABETES MELLITUS WITH HYPERGLYCEMIA (HCC): ICD-10-CM

## 2021-03-09 RX ORDER — INSULIN GLARGINE AND LIXISENATIDE 100; 33 U/ML; UG/ML
INJECTION, SOLUTION SUBCUTANEOUS
Qty: 54 ML | Refills: 1 | Status: SHIPPED | OUTPATIENT
Start: 2021-03-09 | End: 2021-04-16

## 2021-04-05 RX ORDER — INSULIN LISPRO 100 [IU]/ML
INJECTION, SOLUTION INTRAVENOUS; SUBCUTANEOUS
Qty: 45 ML | Refills: 0 | Status: SHIPPED | OUTPATIENT
Start: 2021-04-05 | End: 2021-05-17

## 2021-04-06 RX ORDER — FLURBIPROFEN SODIUM 0.3 MG/ML
SOLUTION/ DROPS OPHTHALMIC
Qty: 400 EACH | Refills: 3 | Status: SHIPPED | OUTPATIENT
Start: 2021-04-06 | End: 2022-01-24

## 2021-04-15 DIAGNOSIS — E11.65 UNCONTROLLED TYPE 2 DIABETES MELLITUS WITH HYPERGLYCEMIA (HCC): ICD-10-CM

## 2021-04-16 RX ORDER — INSULIN GLARGINE AND LIXISENATIDE 100; 33 U/ML; UG/ML
INJECTION, SOLUTION SUBCUTANEOUS
Qty: 45 ML | Refills: 0 | Status: SHIPPED | OUTPATIENT
Start: 2021-04-16 | End: 2021-05-05 | Stop reason: SDUPTHER

## 2021-05-05 DIAGNOSIS — E11.65 UNCONTROLLED TYPE 2 DIABETES MELLITUS WITH HYPERGLYCEMIA (HCC): ICD-10-CM

## 2021-05-05 RX ORDER — INSULIN GLARGINE AND LIXISENATIDE 100; 33 U/ML; UG/ML
INJECTION, SOLUTION SUBCUTANEOUS
Qty: 45 ML | Refills: 2 | Status: SHIPPED | OUTPATIENT
Start: 2021-05-05 | End: 2021-06-05 | Stop reason: SDUPTHER

## 2021-05-17 RX ORDER — INSULIN LISPRO 100 [IU]/ML
INJECTION, SOLUTION INTRAVENOUS; SUBCUTANEOUS
Qty: 45 ML | Refills: 0 | Status: SHIPPED | OUTPATIENT
Start: 2021-05-17 | End: 2021-05-24 | Stop reason: SDUPTHER

## 2021-05-24 ENCOUNTER — OFFICE VISIT (OUTPATIENT)
Dept: ENDOCRINOLOGY | Facility: CLINIC | Age: 51
End: 2021-05-24

## 2021-05-24 ENCOUNTER — LAB (OUTPATIENT)
Dept: LAB | Facility: HOSPITAL | Age: 51
End: 2021-05-24

## 2021-05-24 VITALS
BODY MASS INDEX: 40.15 KG/M2 | HEART RATE: 81 BPM | HEIGHT: 63 IN | SYSTOLIC BLOOD PRESSURE: 124 MMHG | WEIGHT: 226.6 LBS | DIASTOLIC BLOOD PRESSURE: 72 MMHG | OXYGEN SATURATION: 98 %

## 2021-05-24 DIAGNOSIS — IMO0002 DIABETES MELLITUS TYPE 2, UNCONTROLLED, WITH COMPLICATIONS: ICD-10-CM

## 2021-05-24 DIAGNOSIS — E78.2 MIXED HYPERLIPIDEMIA: ICD-10-CM

## 2021-05-24 DIAGNOSIS — E11.65 UNCONTROLLED TYPE 2 DIABETES MELLITUS WITH HYPERGLYCEMIA (HCC): Primary | ICD-10-CM

## 2021-05-24 DIAGNOSIS — E66.01 CLASS 3 SEVERE OBESITY DUE TO EXCESS CALORIES WITH SERIOUS COMORBIDITY AND BODY MASS INDEX (BMI) OF 40.0 TO 44.9 IN ADULT (HCC): ICD-10-CM

## 2021-05-24 PROBLEM — E66.813 CLASS 3 SEVERE OBESITY DUE TO EXCESS CALORIES WITH SERIOUS COMORBIDITY AND BODY MASS INDEX (BMI) OF 40.0 TO 44.9 IN ADULT: Status: ACTIVE | Noted: 2020-07-07

## 2021-05-24 LAB
ALBUMIN SERPL-MCNC: 4.4 G/DL (ref 3.5–5.2)
ALBUMIN/GLOB SERPL: 1.5 G/DL
ALP SERPL-CCNC: 84 U/L (ref 39–117)
ALT SERPL W P-5'-P-CCNC: 18 U/L (ref 1–33)
ANION GAP SERPL CALCULATED.3IONS-SCNC: 8.9 MMOL/L (ref 5–15)
AST SERPL-CCNC: 36 U/L (ref 1–32)
BILIRUB SERPL-MCNC: 0.2 MG/DL (ref 0–1.2)
BUN SERPL-MCNC: 10 MG/DL (ref 6–20)
BUN/CREAT SERPL: 13.2 (ref 7–25)
CALCIUM SPEC-SCNC: 8.8 MG/DL (ref 8.6–10.5)
CHLORIDE SERPL-SCNC: 111 MMOL/L (ref 98–107)
CHOLEST SERPL-MCNC: 135 MG/DL (ref 0–200)
CO2 SERPL-SCNC: 21.1 MMOL/L (ref 22–29)
CREAT SERPL-MCNC: 0.76 MG/DL (ref 0.57–1)
EXPIRATION DATE: ABNORMAL
EXPIRATION DATE: NORMAL
GFR SERPL CREATININE-BSD FRML MDRD: 81 ML/MIN/1.73
GLOBULIN UR ELPH-MCNC: 3 GM/DL
GLUCOSE BLDC GLUCOMTR-MCNC: 246 MG/DL (ref 70–130)
GLUCOSE SERPL-MCNC: 227 MG/DL (ref 65–99)
HBA1C MFR BLD: 9.3 %
HDLC SERPL-MCNC: 53 MG/DL (ref 40–60)
LDLC SERPL CALC-MCNC: 54 MG/DL (ref 0–100)
LDLC/HDLC SERPL: 0.9 {RATIO}
Lab: ABNORMAL
Lab: NORMAL
POTASSIUM SERPL-SCNC: 4.7 MMOL/L (ref 3.5–5.2)
PROT SERPL-MCNC: 7.4 G/DL (ref 6–8.5)
SODIUM SERPL-SCNC: 141 MMOL/L (ref 136–145)
TRIGL SERPL-MCNC: 171 MG/DL (ref 0–150)
VLDLC SERPL-MCNC: 28 MG/DL (ref 5–40)

## 2021-05-24 PROCEDURE — 83036 HEMOGLOBIN GLYCOSYLATED A1C: CPT | Performed by: INTERNAL MEDICINE

## 2021-05-24 PROCEDURE — 99214 OFFICE O/P EST MOD 30 MIN: CPT | Performed by: INTERNAL MEDICINE

## 2021-05-24 PROCEDURE — 80053 COMPREHEN METABOLIC PANEL: CPT | Performed by: INTERNAL MEDICINE

## 2021-05-24 PROCEDURE — 80061 LIPID PANEL: CPT | Performed by: INTERNAL MEDICINE

## 2021-05-24 PROCEDURE — 82947 ASSAY GLUCOSE BLOOD QUANT: CPT | Performed by: INTERNAL MEDICINE

## 2021-05-24 RX ORDER — EMPAGLIFLOZIN 25 MG/1
1 TABLET, FILM COATED ORAL DAILY
Qty: 90 TABLET | Refills: 1 | Status: SHIPPED | OUTPATIENT
Start: 2021-05-24 | End: 2021-12-13

## 2021-05-24 RX ORDER — ATORVASTATIN CALCIUM 40 MG/1
40 TABLET, FILM COATED ORAL DAILY
Qty: 90 TABLET | Refills: 1 | Status: SHIPPED | OUTPATIENT
Start: 2021-05-24 | End: 2021-07-23 | Stop reason: SDUPTHER

## 2021-05-24 RX ORDER — INSULIN LISPRO 100 [IU]/ML
INJECTION, SOLUTION INTRAVENOUS; SUBCUTANEOUS
Qty: 45 ML | Refills: 3 | Status: SHIPPED | OUTPATIENT
Start: 2021-05-24 | End: 2022-05-18

## 2021-05-24 NOTE — PROGRESS NOTES
Chief Complaint   Patient presents with   • Diabetes          HPI   Patricia Brown is a 51 y.o. female had concerns including Diabetes.    She is checking blood sugars 1-2 times per day.  Average blood sugars 201.  Highest 299, lowest 125.  Current medications for diabetes include Soliqua 60 units once daily, Humalog 12 units with meals +1: 50 greater than 150.  She generally eats twice per day.  Does not check her blood sugars unless she is eating.  She stopped taking the jardiance because she heard it may cause limb loss.  Denies any UTI or yeast infection symptoms.  Tolerated without difficulty.  Noted improvement in BG's when she was taking consistently.    Patient is tolerating atorvastatin without issue.  She changed from pravastatin at her last visit for LDL above goal.    She is snacking too much at night - twizzlers, cashews, cookies.   Has planned to change this. More snacking on fruit.     Her father had a stroke Tuesday. He is doing ok. Will require a stent in the carotid artery.     The following portions of the patient's history were reviewed and updated as appropriate: allergies, current medications, past family history, past medical history, past social history, past surgical history and problem list.      Review of Systems   Constitutional: Negative.    Eyes: Visual disturbance: see HPI.   Endocrine: Positive for polydipsia and polyuria.        See hpi        Physical Exam  Vitals reviewed.   Constitutional:       Appearance: Normal appearance. She is obese.   Cardiovascular:      Rate and Rhythm: Normal rate.      Pulses:           Dorsalis pedis pulses are 2+ on the right side and 2+ on the left side.   Pulmonary:      Effort: Pulmonary effort is normal.   Feet:      Right foot:      Skin integrity: Dry skin present.      Left foot:      Skin integrity: Dry skin present.      Comments: Monofilament 5/5 bilaterally    Diabetic Foot Exam Performed and Monofilament Test Performed      Neurological:  "     General: No focal deficit present.      Mental Status: She is alert. Mental status is at baseline.   Psychiatric:         Mood and Affect: Mood normal.         Behavior: Behavior normal.        /72   Pulse 81   Ht 160 cm (63\")   Wt 103 kg (226 lb 9.6 oz)   SpO2 98%   BMI 40.14 kg/m²      Labs and imaging    CMP:  Lab Results   Component Value Date    BUN 11 01/11/2021    CREATININE 0.81 01/11/2021    EGFRIFNONA 75 01/11/2021    BCR 13.6 01/11/2021     01/11/2021    K 4.5 01/11/2021    CO2 23.4 01/11/2021    CALCIUM 9.6 01/11/2021    ALBUMIN 4.60 01/11/2021    BILITOT 0.5 01/11/2021    ALKPHOS 100 01/11/2021    AST 96 (H) 01/11/2021    ALT 46 (H) 01/11/2021     Lipid Panel:  Lab Results   Component Value Date    CHOL 194 01/11/2021    TRIG 172 (H) 01/11/2021    HDL 60 01/11/2021    VLDL 30 01/11/2021     (H) 01/11/2021     HbA1c:  Lab Results   Component Value Date    HGBA1C 9.3 05/24/2021    HGBA1C 8.5 02/24/2021     Glucose:    Lab Results   Component Value Date    POCGLU 246 (A) 05/24/2021     Microalbumin:  Lab Results   Component Value Date    MALBCRERATIO  09/15/2020      Comment:      Unable to calculate     TSH:  Lab Results   Component Value Date    TSH 1.270 01/11/2021       Assessment and plan  Diagnoses and all orders for this visit:    1. Uncontrolled type 2 diabetes mellitus with hyperglycemia (CMS/[facility]) (Primary)  Uncontrolled with A1c up to 9.3., with frequent hyperglycemia.  Resume Jardiance.  No metformin due to history of GI intolerance.  Continue Soliqua 60 units once daily, Humalog 12 units with meals +1: 50 greater than 150.  Check BG's 3-4 times daily.  Call the office if persistently greater than 200.  Ophtho exam should be updated yearly.  Monofilament was updated today.  Urine microalbumin updated from September.  Checking CMP and lipid today.  -     POC Glycosylated Hemoglobin (Hb A1C)  -     POC Glucose, Blood  -     Lipid Panel  -     Comprehensive Metabolic " Panel  -     atorvastatin (Lipitor) 40 MG tablet; Take 1 tablet by mouth Daily.  Dispense: 90 tablet; Refill: 1  -     glucose blood test strip; 1 each by Other route 4 (Four) Times a Day. Use as instructed  Dispense: 400 each; Refill: 3  -     Insulin Lispro, 1 Unit Dial, (HumaLOG KwikPen) 100 UNIT/ML solution pen-injector; 12 UNITS 15  MINUTES BEFORE MEALS PLUS CORRECTION 1:50 FOR Bg >150. MAX DAILY DOSE : 50 UNITS  Dispense: 45 mL; Refill: 3    2. Class 3 severe obesity due to excess calories with serious comorbidity and body mass index (BMI) of 40.0 to 44.9 in adult (CMS/[facility])  BMI up to 40.2.  Weight loss necessary for overall health and diabetes management.  Resuming Jardiance may aid in weight loss.  Dietary modifications (decrease snacking) discussed.    3. Mixed hyperlipidemia  Changed to atorvastatin from pravastatin after last visit as LDL was above goal.  Repeat CMP and lipid panel today.  -     atorvastatin (Lipitor) 40 MG tablet; Take 1 tablet by mouth Daily.  Dispense: 90 tablet; Refill: 1         Return in about 3 months (around 8/24/2021) for next scheduled follow up. The patient was instructed to contact the clinic with any interval questions or concerns.    Callie Ramirez, DO   Endocrinologist    Please note that portions of this document were completed with a voice recognition program. Efforts were made to edit the dictations, but occasionally words are mis-transcribed.

## 2021-05-24 NOTE — PATIENT INSTRUCTIONS
Take humalog 12 units before meals plus extra if your blood sugar is high.   If BG is 150-199: extra 1 unit  -249: extra 2 units  -299: extra 3 units  -349: extra 4 units  +: extra 5 units.     The 12 units is to cover the meal. The extra is for high blood sugar. So, if it has been or will be 3-4 hrs after/before the next dose, take the extra units if needed.

## 2021-05-25 RX ORDER — BLOOD SUGAR DIAGNOSTIC
STRIP MISCELLANEOUS
Qty: 600 EACH | Refills: 3 | Status: SHIPPED | OUTPATIENT
Start: 2021-05-25 | End: 2021-09-13

## 2021-05-25 NOTE — TELEPHONE ENCOUNTER
[employer] message RX was sent for generic yesterday    Prescription Question    Patricia Brown Kelly Craft, DO 14 hours ago (5:28 PM)     Can you please send in a prescription to [facility] for AccuCheck guide test strips for me? Thank you so very much. It was nice to see you today.   Patricia Brown

## 2021-05-27 ENCOUNTER — PREP FOR SURGERY (OUTPATIENT)
Dept: OTHER | Facility: HOSPITAL | Age: 51
End: 2021-05-27

## 2021-05-27 ENCOUNTER — DOCUMENTATION (OUTPATIENT)
Dept: OBSTETRICS AND GYNECOLOGY | Facility: CLINIC | Age: 51
End: 2021-05-27

## 2021-05-27 DIAGNOSIS — R87.613 HIGH GRADE SQUAMOUS INTRAEPITHELIAL CERVICAL DYSPLASIA: Primary | ICD-10-CM

## 2021-05-27 DIAGNOSIS — I10 HYPERTENSION, UNSPECIFIED TYPE: ICD-10-CM

## 2021-05-27 RX ORDER — SODIUM CHLORIDE 0.9 % (FLUSH) 0.9 %
3 SYRINGE (ML) INJECTION EVERY 12 HOURS SCHEDULED
Status: CANCELLED | OUTPATIENT
Start: 2021-05-27

## 2021-05-27 RX ORDER — SODIUM CHLORIDE 0.9 % (FLUSH) 0.9 %
1-10 SYRINGE (ML) INJECTION AS NEEDED
Status: CANCELLED | OUTPATIENT
Start: 2021-05-27

## 2021-05-27 NOTE — PROGRESS NOTES
I spoke with Patricia at her cell phone number.  She is currently in [place] because her father had a stroke.  She should be coming back late June.  Due to the moderate severe dysplasia my MCC in July I will go ahead and tentatively schedule her for a LEEP conization on Friday, July 2.  She voices understanding and will contact Francy as soon as she knows when she will be coming back so we can get her worked in for colposcopy.

## 2021-05-28 RX ORDER — ENALAPRIL MALEATE 5 MG/1
TABLET ORAL
Qty: 90 TABLET | Refills: 3 | OUTPATIENT
Start: 2021-05-28

## 2021-06-02 ENCOUNTER — TELEPHONE (OUTPATIENT)
Dept: ENDOCRINOLOGY | Facility: CLINIC | Age: 51
End: 2021-06-02

## 2021-06-02 NOTE — TELEPHONE ENCOUNTER
CHETAN WITH [facility] CALLED STATING THAT [facility] RX NEEDS PRIOR AUTH. PRIOR AUTH THAT IS IN EFFECT IS FOR GENERIC, NOT NAME BRAND AND NEEDS A NEW ONE DONE.     NEW PRIOR AUTH REQUEST IN BEING FAXED TO THIS OFFICE. THIS WILL BE URGENT PRIORITY.

## 2021-06-02 NOTE — TELEPHONE ENCOUNTER
Returned patient call advised that the RX was on 5/5/21 She stated they never received it or have filled it. I offered to fax this copy to them. She will contact [facility] and let us know if we need to send to local pharmacy as she is out of medication.      Pharmacy    [facility] - [place], [place] - [address] [place] [address] - [phone]  - [phone] FX   [address] [place] [id number]   Phone:  [phone]  Fax:  [phone]   Orders with any of the following pharmaceutical classes: Antidiabetic    Name Dose Frequency Start Date End Date Medication Warnings Interventions? Order Mode    Insulin Lispro, 1 Unit Dial, (HumaLOG KwikPen) 100 UNIT/ML solution pen-injector   05/24/21    Outpatient    Empagliflozin (Jardiance) 25 MG tablet 1 tablet Daily 05/24/21    Outpatient    Warnings History    No Interaction Warnings Shown    Pharmacist Clinical Review History    This prescription has not been clinically reviewed.   Order Reconciliation Actions       Order Reconciliation Actions   E-Prescribing Status    Outpatient Medication Detail    Insulin Glargine-Lixisenatide (Soliqua) 100-33 UNT-MCG/ML solution pen-injector injection        Sig: Inject up to 60units daily before first meal as directed.        Sent to pharmacy as: Soliqua 100-33 UNT-MCG/ML Subcutaneous Solution Pen-injector (Insulin Glargine-Lixisenatide)        Class: Normal        Notes to Pharmacy: Requesting 1 year supply        E-Prescribing Status: Receipt confirmed by pharmacy (5/5/2021  2:29 PM EDT)

## 2021-06-03 ENCOUNTER — TELEPHONE (OUTPATIENT)
Dept: OBSTETRICS AND GYNECOLOGY | Facility: CLINIC | Age: 51
End: 2021-06-03

## 2021-06-03 NOTE — TELEPHONE ENCOUNTER
Patient called and wants to know if we can do a partial hysterectomy instead of doing a LEEP because she has had this done several times already.    Please advise.

## 2021-06-03 NOTE — TELEPHONE ENCOUNTER
If not a LEEP for diagnosis and treatment would need to do colposcopy and biopsies to be sure this is not anything worse than severe dysplasia.  If that is the case then we could schedule hysterectomy.

## 2021-06-05 DIAGNOSIS — I10 HYPERTENSION, UNSPECIFIED TYPE: ICD-10-CM

## 2021-06-05 DIAGNOSIS — E11.65 UNCONTROLLED TYPE 2 DIABETES MELLITUS WITH HYPERGLYCEMIA (HCC): ICD-10-CM

## 2021-06-07 RX ORDER — INSULIN GLARGINE AND LIXISENATIDE 100; 33 U/ML; UG/ML
INJECTION, SOLUTION SUBCUTANEOUS
Qty: 45 ML | Refills: 1 | Status: SHIPPED | OUTPATIENT
Start: 2021-06-07 | End: 2021-08-25 | Stop reason: SDUPTHER

## 2021-06-07 RX ORDER — ENALAPRIL MALEATE 5 MG/1
5 TABLET ORAL DAILY
Qty: 30 TABLET | Refills: 1 | Status: SHIPPED | OUTPATIENT
Start: 2021-06-07 | End: 2021-08-18 | Stop reason: SDUPTHER

## 2021-06-07 NOTE — TELEPHONE ENCOUNTER
Spoke with patient and she would like to do the Colposcopy instead and possibly do the hysterectomy.

## 2021-06-09 ENCOUNTER — TELEPHONE (OUTPATIENT)
Dept: ENDOCRINOLOGY | Facility: CLINIC | Age: 51
End: 2021-06-09

## 2021-06-09 NOTE — TELEPHONE ENCOUNTER
PT CALLED STATING THAT [facility] REQUIRES ADDITIONAL INFO FROM US TO APPROVE HER SOLIQUA RX. SHE ONLY HAS FOUR DOSES LEFT BEFORE SHE RUNS OUT. PLEASE LOOK INTO THIS AND REACH OUT TO PT. THANK YOU

## 2021-06-23 ENCOUNTER — OFFICE VISIT (OUTPATIENT)
Dept: OBSTETRICS AND GYNECOLOGY | Facility: CLINIC | Age: 51
End: 2021-06-23

## 2021-06-23 VITALS — DIASTOLIC BLOOD PRESSURE: 80 MMHG | SYSTOLIC BLOOD PRESSURE: 126 MMHG | WEIGHT: 226 LBS | BODY MASS INDEX: 40.03 KG/M2

## 2021-06-23 DIAGNOSIS — R87.613 HIGH GRADE INTREPITH LESION CYTO SMR CRVX (HGSIL): ICD-10-CM

## 2021-06-23 PROCEDURE — 57455 BIOPSY OF CERVIX W/SCOPE: CPT | Performed by: OBSTETRICS & GYNECOLOGY

## 2021-06-23 NOTE — PROGRESS NOTES
Colposcopy    Date of procedure:  6/23/2021    Risks and benefits discussed? yes  All questions answered? yes  Consents given by the patient  Written consent obtained? 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  Conization in about 1995 and repeat in 2005.  These were done in [place].  Procedure documentation:    The cervix was viewed colposcopically with and without a green filter.  The cervix was  bathed in acetic acid.   The findings were as follows:      The transformation zone was not able to be seen adequately due to narrow endocervix    No visible lesions  On the cervix.  Off of the cervix in the right lower quadrant at approximately 7-8 o'clock in the vagina there was some thicker white slightly raised epithelium 2 small biopsies were taken   This was a fairly narrow strip of raised white epithelium approximately 1.5 cm in length    Vaginal biopsies were taken from 7 o'clock and 8 o'clock.  Silver nitrate was applied to the biopsy sites and adequate hemostasis was applied.      The endocervical speculum was not used.  The endocervical opening was pinpoint and too small to place a narrow speculum  An ECC and/or Cytobrush biopsy was performed.         Colposcopic Impression: 1. Inadequate colposcopy therefore Cytobrush obtained  2. Colposcopic findings are inconsistent with PAP; however the biopsies on the vagina may be the source of the high-grade dysplasia.             Plan:                                              1.  Follow-up results of biopsy                                                          2.  she is tentatively scheduled for LEEP may have to also perform laser vaporization of the vagina near right lower quadrant of cervix                                                                                                                                                                                                                            This note was electronically signed.    Delta Mahajan M.D.  June 23, 2021

## 2021-06-28 ENCOUNTER — TELEPHONE (OUTPATIENT)
Dept: OBSTETRICS AND GYNECOLOGY | Facility: CLINIC | Age: 51
End: 2021-06-28

## 2021-06-28 PROBLEM — N89.3 VAGINAL DYSPLASIA: Status: ACTIVE | Noted: 2021-06-28

## 2021-06-28 NOTE — TELEPHONE ENCOUNTER
Biopsy results from colposcopy are in her chart.  Wants results.  Does not want to have procedure      I spoke with Neo and her cell phone # [phone]  Told her the results of the vaginal biopsy showed moderate to severe dysplasia.  It is listed as a cervical biopsy at 8:00 but the tissue was off the cervix and on the vagina at about 8:00.  She would rather not have this treated I think she has had a LEEP and/or cryosurgery in the past in [place].  She would like to follow this and I think that is reasonable as long she gets close follow-up and I would suggest follow-up with in about 6 months.  I will discuss this with Dr. Arias and she can come back to this office for colposcopy in 6 months time.  She is agreeable and her questions were answered.

## 2021-06-29 NOTE — PROGRESS NOTES
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  She declined proceeding with scheduled LEEP and laser of the vagina this Friday.  She would like to avoid surgery.  The new plan is for her to see back for colposcopy in 6 months.  Thank you

## 2021-06-30 ENCOUNTER — APPOINTMENT (OUTPATIENT)
Dept: PREADMISSION TESTING | Facility: HOSPITAL | Age: 51
End: 2021-06-30

## 2021-07-15 ENCOUNTER — TELEPHONE (OUTPATIENT)
Dept: OBSTETRICS AND GYNECOLOGY | Facility: CLINIC | Age: 51
End: 2021-07-15

## 2021-07-15 NOTE — TELEPHONE ENCOUNTER
----- Message from Sarah Wilson MA sent at 7/9/2021 11:01 AM EDT -----  Left message for patient to call be back    I left another voicemail with Patricia on July 15 at approximately 1:20 PM emphasizing need to follow-up with Dr. Iyer and to call the office at [phone] to schedule that appointment to follow-up her severe vaginal dysplasia.  I previously told her to follow-up with Dr. Arias however he will be retiring and would not be able to see her.

## 2021-07-23 ENCOUNTER — TELEPHONE (OUTPATIENT)
Dept: ENDOCRINOLOGY | Facility: CLINIC | Age: 51
End: 2021-07-23

## 2021-07-23 DIAGNOSIS — E11.65 UNCONTROLLED TYPE 2 DIABETES MELLITUS WITH HYPERGLYCEMIA (HCC): ICD-10-CM

## 2021-07-23 DIAGNOSIS — E78.2 MIXED HYPERLIPIDEMIA: ICD-10-CM

## 2021-07-23 RX ORDER — ATORVASTATIN CALCIUM 40 MG/1
40 TABLET, FILM COATED ORAL DAILY
Qty: 90 TABLET | Refills: 1 | Status: SHIPPED | OUTPATIENT
Start: 2021-07-23 | End: 2022-06-20

## 2021-07-23 NOTE — TELEPHONE ENCOUNTER
Left message letting pt know that I was sent a notification from [facility] that she had not picked up her Pravastatin. Her chart states to stop Pravastatin and start Atorvastatin, which I let the pharmacy know but I asked her to call me if she was not using the atorvastatin or if she was having problems getting it.

## 2021-08-17 ENCOUNTER — TELEPHONE (OUTPATIENT)
Dept: FAMILY MEDICINE CLINIC | Facility: CLINIC | Age: 51
End: 2021-08-17

## 2021-08-17 NOTE — TELEPHONE ENCOUNTER
08/17/2021 21:34   Called patient regarding her message.  She did elaborate.  She was tested on Saturday with no symptoms.  She tested negative.  On Sunday she developed symptoms.  Had a positive Covid test.  She originally had a  headache, diarrhea, body aches, cough with production.  It has improved slightly.  She continues to have a headache, body aches and a light cough.  It is nonproductive.  She is concerned about her health and her comorbidities.  History of heart murmur, hyperlipidemia, hyper tension, cardiomyopathy, diabetes, and obesity.  Will check the newest guidelines with  in the a.m.  Will get back to patient.  In the meantime rest, hydrate, take Tylenol as needed for pain, headache or fever.  Avoid dairy products due to mucus production.    Go to the emergency room for any sudden shortness of air or chest pain.    She is agreeable.  Caller: Patricia Brown    Relationship: Self    Best call back number: [phone]    What is the best time to reach you: ANYTIME     Who are you requesting to speak with (clinical staff, provider,  specific staff member): NING DONOHUE     What was the call regarding: PATIENT STATES THAT'S HSE HAS TESTED POSITIVE FOR COVID EVEN THOUGH SHE HAS THE VACCINE. SHE IS WANTING TO KNOW, GIVEN HER MEDICAL HISTORY, WHAT HER PROVIDER WOULD LIKE FOR HER TO DO.     Do you require a callback: YES

## 2021-08-18 ENCOUNTER — TELEPHONE (OUTPATIENT)
Dept: FAMILY MEDICINE CLINIC | Facility: CLINIC | Age: 51
End: 2021-08-18

## 2021-08-18 DIAGNOSIS — I10 HYPERTENSION, UNSPECIFIED TYPE: ICD-10-CM

## 2021-08-18 RX ORDER — ENALAPRIL MALEATE 5 MG/1
5 TABLET ORAL DAILY
Qty: 90 TABLET | Refills: 0 | Status: SHIPPED | OUTPATIENT
Start: 2021-08-18 | End: 2021-12-28 | Stop reason: SDUPTHER

## 2021-08-18 NOTE — TELEPHONE ENCOUNTER
Call patient regarding COVID-19 monoclonal antibody infusion.  Discussed.  She  is feeling better today much improved since Sunday.  She is currently declining infusion.  Will follow up if she worsens.  She will go to the emergency room for severe shortness of air or chest pain.    She is requesting refill for her medication enalapril.   Will reorder. She is to f/u within the next 3 months and as needed.     Mariela Hall, APRN

## 2021-08-25 DIAGNOSIS — E11.65 UNCONTROLLED TYPE 2 DIABETES MELLITUS WITH HYPERGLYCEMIA (HCC): ICD-10-CM

## 2021-08-25 NOTE — TELEPHONE ENCOUNTER
PATIENT STATES THAT [facility] HAS CANCELLED HER ORDER FOR THIS MEDICATION. SHE WOULD LIKE TO HAVE IT SENT TO HER LOCAL PHARMACY. SHE STATES THAT SHE HAS ABOUT 3 DAYS OF MEDICATION LEFT AT THE TIME OF THIS CALL.

## 2021-08-26 RX ORDER — INSULIN GLARGINE AND LIXISENATIDE 100; 33 U/ML; UG/ML
INJECTION, SOLUTION SUBCUTANEOUS
Qty: 45 ML | Refills: 1 | Status: SHIPPED | OUTPATIENT
Start: 2021-08-26 | End: 2021-09-13

## 2021-09-13 ENCOUNTER — OFFICE VISIT (OUTPATIENT)
Dept: ENDOCRINOLOGY | Facility: CLINIC | Age: 51
End: 2021-09-13

## 2021-09-13 ENCOUNTER — LAB (OUTPATIENT)
Dept: LAB | Facility: HOSPITAL | Age: 51
End: 2021-09-13

## 2021-09-13 VITALS
WEIGHT: 227 LBS | HEART RATE: 80 BPM | HEIGHT: 63 IN | OXYGEN SATURATION: 99 % | SYSTOLIC BLOOD PRESSURE: 124 MMHG | DIASTOLIC BLOOD PRESSURE: 74 MMHG | BODY MASS INDEX: 40.22 KG/M2

## 2021-09-13 DIAGNOSIS — M79.672 FOOT PAIN, BILATERAL: ICD-10-CM

## 2021-09-13 DIAGNOSIS — IMO0002 DIABETES MELLITUS TYPE 2, UNCONTROLLED, WITH COMPLICATIONS: Primary | ICD-10-CM

## 2021-09-13 DIAGNOSIS — E78.2 MIXED HYPERLIPIDEMIA: ICD-10-CM

## 2021-09-13 DIAGNOSIS — M79.671 FOOT PAIN, BILATERAL: ICD-10-CM

## 2021-09-13 LAB
EXPIRATION DATE: ABNORMAL
EXPIRATION DATE: NORMAL
GLUCOSE BLDC GLUCOMTR-MCNC: 219 MG/DL (ref 70–130)
HBA1C MFR BLD: 8.4 %
Lab: ABNORMAL
Lab: NORMAL

## 2021-09-13 PROCEDURE — 82947 ASSAY GLUCOSE BLOOD QUANT: CPT | Performed by: INTERNAL MEDICINE

## 2021-09-13 PROCEDURE — 82570 ASSAY OF URINE CREATININE: CPT | Performed by: INTERNAL MEDICINE

## 2021-09-13 PROCEDURE — 82043 UR ALBUMIN QUANTITATIVE: CPT | Performed by: INTERNAL MEDICINE

## 2021-09-13 PROCEDURE — 99214 OFFICE O/P EST MOD 30 MIN: CPT | Performed by: INTERNAL MEDICINE

## 2021-09-13 RX ORDER — LANCETS 30 GAUGE
1 EACH MISCELLANEOUS 4 TIMES DAILY
Qty: 400 EACH | Refills: 3 | Status: SHIPPED | OUTPATIENT
Start: 2021-09-13 | End: 2022-05-11 | Stop reason: SDUPTHER

## 2021-09-13 RX ORDER — PERPHENAZINE 16 MG/1
1 TABLET, FILM COATED ORAL 4 TIMES DAILY
Qty: 400 EACH | Refills: 3 | Status: SHIPPED | OUTPATIENT
Start: 2021-09-13 | End: 2022-05-11 | Stop reason: SDUPTHER

## 2021-09-13 RX ORDER — DULAGLUTIDE 1.5 MG/.5ML
1.5 INJECTION, SOLUTION SUBCUTANEOUS
Qty: 2 ML | Refills: 5 | Status: SHIPPED | OUTPATIENT
Start: 2021-09-13 | End: 2021-12-13

## 2021-09-13 RX ORDER — BLOOD-GLUCOSE METER
EACH MISCELLANEOUS
COMMUNITY
End: 2022-08-15

## 2021-09-13 RX ORDER — PERPHENAZINE 16 MG/1
1 TABLET, FILM COATED ORAL AS NEEDED
COMMUNITY
End: 2021-09-13 | Stop reason: SDUPTHER

## 2021-09-13 NOTE — PROGRESS NOTES
Chief Complaint   Patient presents with   • Diabetes          HPI   Patricia Brown is a 51 y.o. female had concerns including Diabetes.    She is checking blood sugars 3 times per day. AM BGs always in the 200s. Snacks before bed and doesn't check BGs before bed.   Current medications for diabetes include soliqua 60 units and humalog, jardiance 25 mg daily.  Was on trulicity in the past and tolerated without issue.      now. She ate recently. She took only the 12 units.     If she has higher carb meal she may take up to 16-20 units.     Having bilateral foot pain. Started several months ago. Pain is worst when she rests and first gets up to ambulate. Has sharp pain with standing and then resolves with movement/walking for a bit.    Had COVID about 5 weeks ago. No major BG issues. She had been vaccinated. Felt like the flu.     The following portions of the patient's history were reviewed and updated as appropriate: allergies, current medications, past family history, past medical history, past social history, past surgical history and problem list.      Review of Systems   Constitutional: Negative.    Endocrine:        See HPI   Musculoskeletal: Positive for gait problem.        Foot pain   Psychiatric/Behavioral: Positive for dysphoric mood.        Physical Exam  Vitals reviewed.   Constitutional:       Appearance: Normal appearance. She is obese.      Comments: Body mass index is 40.21 kg/m².   Cardiovascular:      Rate and Rhythm: Normal rate.      Pulses:           Dorsalis pedis pulses are 2+ on the right side and 2+ on the left side.   Pulmonary:      Effort: Pulmonary effort is normal.   Feet:      Right foot:      Skin integrity: Skin integrity normal.      Left foot:      Skin integrity: Skin integrity normal.      Comments: Diabetic Foot Exam Performed and Monofilament Test Performed    Monofilament 5/5 bilaterally    Neurological:      General: No focal deficit present.      Mental Status: She is  "alert. Mental status is at baseline.   Psychiatric:         Mood and Affect: Mood normal.         Behavior: Behavior normal.        /74 (BP Location: Left arm, Patient Position: Sitting, Cuff Size: Adult)   Pulse 80   Ht 160 cm (63\")   Wt 103 kg (227 lb)   SpO2 99%   BMI 40.21 kg/m²      Labs and imaging    CMP:  Lab Results   Component Value Date    BUN 10 05/24/2021    CREATININE 0.76 05/24/2021    EGFRIFNONA 81 05/24/2021    BCR 13.2 05/24/2021     05/24/2021    K 4.7 05/24/2021    CO2 21.1 (L) 05/24/2021    CALCIUM 8.8 05/24/2021    ALBUMIN 4.40 05/24/2021    BILITOT 0.2 05/24/2021    ALKPHOS 84 05/24/2021    AST 36 (H) 05/24/2021    ALT 18 05/24/2021     Lipid Panel:  Lab Results   Component Value Date    CHOL 135 05/24/2021    TRIG 171 (H) 05/24/2021    HDL 53 05/24/2021    VLDL 28 05/24/2021    LDL 54 05/24/2021     HbA1c:  Lab Results   Component Value Date    HGBA1C 8.4 09/13/2021    HGBA1C 9.3 05/24/2021     Glucose:    Lab Results   Component Value Date    POCGLU 219 (A) 09/13/2021     Microalbumin:  Lab Results   Component Value Date    MALBCRERATIO  09/15/2020      Comment:      Unable to calculate     TSH:  Lab Results   Component Value Date    TSH 1.270 01/11/2021       Assessment and plan  Diagnoses and all orders for this visit:    1. Diabetes mellitus type 2, uncontrolled, with complications (CMS/[facility]) (Primary)  Uncontrolled with persistent hyperglycemia.  A1c though is improved 8.4 from 9.3.  No metformin due to history of GI intolerance.  Continue Jardiance 25 mg daily.  Discontinue Soliqua and transition to Lantus 60 units nightly.  Start Trulicity 1.5 mg weekly and titrate up monthly to max dose as tolerated.  Continue Humalog 12 to 20 units with meals +1: 50 greater than 150.  CMP and lipid are up-to-date from May.  Urine microalbumin is due and will be checked today.  Ophtho exam should be updated yearly.  Monofilament updated today.  -     POC Glycosylated Hemoglobin (Hb " A1C)  -     POC Glucose, Blood  -     Insulin Glargine (LANTUS SOLOSTAR) 100 UNIT/ML injection pen; Inject 60 Units under the skin into the appropriate area as directed Every Night.  Dispense: 60 mL; Refill: 1  -     Dulaglutide (Trulicity) 1.5 MG/0.5ML solution pen-injector; Inject 1.5 mg under the skin into the appropriate area as directed Every 7 (Seven) Days.  Dispense: 2 mL; Refill: 5  -     Microalbumin / Creatinine Urine Ratio - Urine, Clean Catch  -     glucose blood (Contour Next Test) test strip; 1 each by Other route 4 (Four) Times a Day. Use as instructed  Dispense: 400 each; Refill: 3  -     Lancets misc; 1 each 4 (Four) Times a Day.  Dispense: 400 each; Refill: 3    2. Foot pain, bilateral  Suspect plantar fascitis. Refer to podiatry.   -     Ambulatory Referral to Podiatry    3. Mixed hyperlipidemia  At goal on atorvastatin 40 mg daily.  Last labs updated 5/24/2021.  Monitor yearly.    Return in about 3 months (around 12/13/2021) for next scheduled follow up. The patient was instructed to contact the clinic with any interval questions or concerns.    Callie Ramirez, DO   Endocrinologist    Please note that portions of this document were completed with a voice recognition program. Efforts were made to edit the dictations, but occasionally words are mis-transcribed.

## 2021-09-14 LAB
ALBUMIN UR-MCNC: 1.3 MG/DL
CREAT UR-MCNC: 87.2 MG/DL
MICROALBUMIN/CREAT UR: 14.9 MG/G

## 2021-09-15 ENCOUNTER — PRIOR AUTHORIZATION (OUTPATIENT)
Dept: ENDOCRINOLOGY | Facility: CLINIC | Age: 51
End: 2021-09-15

## 2021-09-15 NOTE — TELEPHONE ENCOUNTER
Approvedtoday  Request Reference Number: [id number]. TRULICITY INJ 1.5/0.5 is approved through 09/15/2022. Your patient may now fill this prescription and it will be covered.  Drug  Trulicity 1.5MG/0.5ML pen-injectors  Form  [employer] Electronic Prior Authorization Form (2017 NCPDP)

## 2021-10-21 ENCOUNTER — PRIOR AUTHORIZATION (OUTPATIENT)
Dept: ENDOCRINOLOGY | Facility: CLINIC | Age: 51
End: 2021-10-21

## 2021-10-21 NOTE — TELEPHONE ENCOUNTER
Patricia Kevin Rendon: [id number] - PA Case ID: [id number] - Rx #: [id number] help? Call us at [phone]  Outcome  Approvedtoday  Request Reference Number: [id number]. SOLIQUA /33 is approved through 10/21/2022. Your patient may now fill this prescription and it will be covered.  Drug  Soliqua 100-33UNT-MCG/ML pen-injectors  Form  [employer] Electronic Prior Authorization Form (2017 NCPDP)  Original Claim Info  75

## 2021-12-13 ENCOUNTER — OFFICE VISIT (OUTPATIENT)
Dept: ENDOCRINOLOGY | Facility: CLINIC | Age: 51
End: 2021-12-13

## 2021-12-13 VITALS
OXYGEN SATURATION: 99 % | HEIGHT: 63 IN | SYSTOLIC BLOOD PRESSURE: 130 MMHG | WEIGHT: 225 LBS | HEART RATE: 74 BPM | BODY MASS INDEX: 39.87 KG/M2 | DIASTOLIC BLOOD PRESSURE: 78 MMHG

## 2021-12-13 DIAGNOSIS — IMO0002 DIABETES MELLITUS TYPE 2, UNCONTROLLED, WITH COMPLICATIONS: Primary | ICD-10-CM

## 2021-12-13 DIAGNOSIS — E66.01 CLASS 2 SEVERE OBESITY DUE TO EXCESS CALORIES WITH SERIOUS COMORBIDITY AND BODY MASS INDEX (BMI) OF 39.0 TO 39.9 IN ADULT (HCC): ICD-10-CM

## 2021-12-13 LAB
EXPIRATION DATE: ABNORMAL
EXPIRATION DATE: NORMAL
GLUCOSE BLDC GLUCOMTR-MCNC: 165 MG/DL (ref 70–130)
HBA1C MFR BLD: 8.3 %
Lab: ABNORMAL
Lab: NORMAL

## 2021-12-13 PROCEDURE — 83036 HEMOGLOBIN GLYCOSYLATED A1C: CPT | Performed by: INTERNAL MEDICINE

## 2021-12-13 PROCEDURE — 99214 OFFICE O/P EST MOD 30 MIN: CPT | Performed by: INTERNAL MEDICINE

## 2021-12-13 PROCEDURE — 82947 ASSAY GLUCOSE BLOOD QUANT: CPT | Performed by: INTERNAL MEDICINE

## 2021-12-13 RX ORDER — DULAGLUTIDE 4.5 MG/.5ML
4.5 INJECTION, SOLUTION SUBCUTANEOUS WEEKLY
COMMUNITY
End: 2021-12-13

## 2021-12-13 RX ORDER — DULAGLUTIDE 4.5 MG/.5ML
4.5 INJECTION, SOLUTION SUBCUTANEOUS WEEKLY
Qty: 6 ML | Refills: 1 | Status: CANCELLED | OUTPATIENT
Start: 2021-12-13

## 2021-12-13 NOTE — PROGRESS NOTES
"Chief Complaint   Patient presents with   • Diabetes          HPI   Patricia Brown is a 51 y.o. female had concerns including Diabetes.    She is checking blood sugars 2 times per day.  Average glucose 210, highest 331, lowest 148.  Weight is down 2 pounds since her last visit here.  Current medications for diabetes include Lantus 60 units once daily, Humalog 12 units with meals (1-2 times daily), Trulicity 1.5 mg weekly.  She discontinued Jardiance due to polyuria and nocturia.  Symptoms are improved.    The following portions of the patient's history were reviewed and updated as appropriate: allergies, current medications, past family history, past medical history, past social history, past surgical history and problem list.      Review of Systems   Constitutional: Negative.    Endocrine:        See HPI        Physical Exam  Vitals reviewed.   Constitutional:       Appearance: Normal appearance. She is obese.      Comments: Body mass index is 39.86 kg/m².   Cardiovascular:      Rate and Rhythm: Normal rate.   Pulmonary:      Effort: Pulmonary effort is normal.   Neurological:      General: No focal deficit present.      Mental Status: She is alert. Mental status is at baseline.   Psychiatric:         Mood and Affect: Mood normal.         Behavior: Behavior normal.        /78   Pulse 74   Ht 160 cm (63\")   Wt 102 kg (225 lb)   SpO2 99%   BMI 39.86 kg/m²      Labs and imaging    CMP:  Lab Results   Component Value Date    BUN 10 05/24/2021    CREATININE 0.76 05/24/2021    EGFRIFNONA 81 05/24/2021    BCR 13.2 05/24/2021     05/24/2021    K 4.7 05/24/2021    CO2 21.1 (L) 05/24/2021    CALCIUM 8.8 05/24/2021    ALBUMIN 4.40 05/24/2021    BILITOT 0.2 05/24/2021    ALKPHOS 84 05/24/2021    AST 36 (H) 05/24/2021    ALT 18 05/24/2021     Lipid Panel:  Lab Results   Component Value Date    CHOL 135 05/24/2021    TRIG 171 (H) 05/24/2021    HDL 53 05/24/2021    VLDL 28 05/24/2021    LDL 54 05/24/2021 "     HbA1c:  Lab Results   Component Value Date    HGBA1C 8.3 12/13/2021    HGBA1C 8.4 09/13/2021     Glucose:    Lab Results   Component Value Date    POCGLU 165 (A) 12/13/2021     Microalbumin:  Lab Results   Component Value Date    MALBCRERATIO 14.9 09/13/2021     TSH:  Lab Results   Component Value Date    TSH 1.270 01/11/2021       Assessment and plan  Diagnoses and all orders for this visit:    1. Diabetes mellitus type 2, uncontrolled, with complications ([facility]) (Primary)  Uncontrolled with persistent hyperglycemia.  A1c 8.3.  No other complications due to diabetes.  No metformin due to history of GI intolerance.  Discontinue Jardiance due to polyuria.  Consider resuming in the future as glucose levels are improved.  Continue Lantus 60 units nightly, Humalog 12 units with meals +2: 50 greater than 150.  Increase Trulicity to 3 mg weekly and titrate up to max dose in a month if tolerated.  Ophtho exam up-to-date from 11/16/2021.  Monofilament up-to-date from September.  Labs are up-to-date with a lipid from May, urine microalbumin from September.  -     POC Glycosylated Hemoglobin (Hb A1C)  -     POC Glucose, Blood  -     Dulaglutide 3 MG/0.5ML solution pen-injector; Inject 0.5 mL under the skin into the appropriate area as directed Every 7 (Seven) Days.  Dispense: 2 mL; Refill: 5  -     Continuous Blood Gluc  (FreeStyle Alcon 2 [place]) device; 1 each Daily.  Dispense: 1 each; Refill: 0  -     Continuous Blood Gluc Sensor (FreeStyle Alcon 2 Sensor) misc; 1 each Every 14 (Fourteen) Days.  Dispense: 6 each; Refill: 2    2. Class 2 severe obesity due to excess calories with serious comorbidity and body mass index (BMI) of 39.0 to 39.9 in adult (HCC)  BMI down to 39.9 with a 2 pound weight loss since her last visit here.  Hopefully increasing Trulicity will aid in continued weight loss.  Decrease calorie intake with a goal for weight loss.       Return in about 4 months (around 4/13/2022) for next scheduled  follow up. The patient was instructed to contact the clinic with any interval questions or concerns.    Callie Ramirez DO   Endocrinologist    Please note that portions of this note were completed with a voice recognition program.

## 2021-12-28 DIAGNOSIS — I10 HYPERTENSION, UNSPECIFIED TYPE: ICD-10-CM

## 2021-12-28 RX ORDER — ENALAPRIL MALEATE 5 MG/1
5 TABLET ORAL DAILY
Qty: 90 TABLET | Refills: 0 | Status: SHIPPED | OUTPATIENT
Start: 2021-12-28 | End: 2022-03-23

## 2022-01-01 PROBLEM — Z01.419 WELL WOMAN EXAM: Status: ACTIVE | Noted: 2022-01-01

## 2022-01-24 RX ORDER — FLURBIPROFEN SODIUM 0.3 MG/ML
SOLUTION/ DROPS OPHTHALMIC
Qty: 400 EACH | Refills: 3 | Status: SHIPPED | OUTPATIENT
Start: 2022-01-24

## 2022-02-14 NOTE — PROGRESS NOTES
Subjective   Chief Complaint   Patient presents with   • Abnormal Pap Smear     Patricia Brown is a 51 y.o. year old  presenting to be seen for a PAP in follow-up of a prior abnormal PAP and/or HPV screen.  She is a former patient of Dr. Mahajan's.  She is has a longstanding history of dysplasia.  She has had several different procedures done on her cervix the last 1 being about 15 years ago.  She had a Pap smear done last year.  Colposcopy with biopsies were done.  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  She was set to come back about 6 months later but due to Dr. Mahajan's custodial that got postponed.  She is seeing me to first time to assume care for this problem.    OTHER THINGS SHE WANTS TO DISCUSS TODAY:  Nothing else     The following portions of the patient's history were reviewed and updated as appropriate:current medications, allergies, past family history, past medical history, past social history and past surgical history.    Social History    Tobacco Use      Smoking status: Former Smoker        Packs/day: 1.00        Years: 20.00        Pack years: 20        Types: Cigarettes        Start date:         Quit date:         Years since quittin.1      Smokeless tobacco: Never Used    Review of Systems  Constitutional POS: nothing reported    NEG: anorexia or night sweats   Genitourinary POS: nothing reported    NEG: dysuria or hematuria      Gastointestinal POS: nothing reported and had colonoscopy in the past 5 years - results are not in record for review    NEG: bloating, change in bowel habits, melena or reflux symptoms   Integument POS: nothing reported    NEG: moles that are changing in size, shape, color or rashes   Breast POS: nothing reported    NEG: persistent breast lump, skin dimpling or nipple discharge        Objective    /72   Resp 14   Wt 103 kg (228 lb)   LMP 02/03/2022 (Approximate)   Breastfeeding No   BMI 40.39 kg/m²     General:  well developed; well nourished  no acute distress   Pelvis: Clinical staff was present for exam  External genitalia:  normal appearance of the external genitalia including Bartholin's and Arrington's glands.  :  urethral meatus normal;  Vaginal:  normal pink mucosa without prolapse or lesions.  Cervix:  normal appearance.     Lab Review   No data reviewed    Imaging   No data reviewed       Assessment   1. 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  Inadequate colposcopy of cervix (6/2021) due to cervical stenosis     Plan   1. Pap was done today.  If she does not receive the results of the Pap within 2 weeks  time, she was instructed to call to find out the results.  I explained to Patricia that because she is being seen in follow-up of a previously abnormal Pap smear, her next Pap needs to be performed in 4-6 months.  She will need to be seen roughly every 6 months until 3 consecutive normal Paps have been obtained.  At that point, she can return to routine screening intervals.  2. The following data needs to be obtained to update her medical records: last colonoscopy.  3. The importance of keeping all planned follow-up and taking all medications as prescribed was emphasized.  4. Follow up for repeat PAP smear 4 months         This note was electronically signed.    Tyson Iyer M.D.  February 15, 2022    Part of this note may be an electronic transcription/translation of spoken language to printed text using the Dragon Dictation System.

## 2022-02-15 ENCOUNTER — OFFICE VISIT (OUTPATIENT)
Dept: OBSTETRICS AND GYNECOLOGY | Facility: CLINIC | Age: 52
End: 2022-02-15

## 2022-02-15 VITALS
BODY MASS INDEX: 40.39 KG/M2 | RESPIRATION RATE: 14 BRPM | DIASTOLIC BLOOD PRESSURE: 72 MMHG | SYSTOLIC BLOOD PRESSURE: 128 MMHG | WEIGHT: 228 LBS

## 2022-02-15 DIAGNOSIS — IMO0002 DIABETES MELLITUS TYPE 2, UNCONTROLLED, WITH COMPLICATIONS: Primary | ICD-10-CM

## 2022-02-15 DIAGNOSIS — N89.3 VAGINAL DYSPLASIA: ICD-10-CM

## 2022-02-15 DIAGNOSIS — R87.613 HIGH GRADE INTREPITH LESION CYTO SMR CRVX (HGSIL): Primary | ICD-10-CM

## 2022-02-15 PROBLEM — E78.2 MIXED HYPERLIPIDEMIA: Status: RESOLVED | Noted: 2020-07-07 | Resolved: 2022-02-15

## 2022-02-15 PROBLEM — F31.9 BIPOLAR 1 DISORDER: Status: RESOLVED | Noted: 2020-07-07 | Resolved: 2022-02-15

## 2022-02-15 PROBLEM — E11.65 UNCONTROLLED TYPE 2 DIABETES MELLITUS WITH HYPERGLYCEMIA: Status: RESOLVED | Noted: 2020-07-19 | Resolved: 2022-02-15

## 2022-02-15 PROBLEM — E78.5 HYPERLIPIDEMIA: Status: ACTIVE | Noted: 2018-01-01

## 2022-02-15 PROBLEM — F32.A DEPRESSED: Status: RESOLVED | Noted: 2020-07-07 | Resolved: 2022-02-15

## 2022-02-15 PROBLEM — I10 ESSENTIAL HYPERTENSION: Status: RESOLVED | Noted: 2020-07-07 | Resolved: 2022-02-15

## 2022-02-15 PROCEDURE — 99213 OFFICE O/P EST LOW 20 MIN: CPT | Performed by: OBSTETRICS & GYNECOLOGY

## 2022-02-15 RX ORDER — DULAGLUTIDE 4.5 MG/.5ML
4.5 INJECTION, SOLUTION SUBCUTANEOUS
Qty: 2 ML | Refills: 3 | Status: SHIPPED | OUTPATIENT
Start: 2022-02-15 | End: 2022-07-27

## 2022-02-17 DIAGNOSIS — N89.3 VAGINAL DYSPLASIA: ICD-10-CM

## 2022-02-17 DIAGNOSIS — R87.613 HIGH GRADE INTREPITH LESION CYTO SMR CRVX (HGSIL): ICD-10-CM

## 2022-02-28 ENCOUNTER — TELEPHONE (OUTPATIENT)
Dept: OBSTETRICS AND GYNECOLOGY | Facility: CLINIC | Age: 52
End: 2022-02-28

## 2022-02-28 NOTE — TELEPHONE ENCOUNTER
PT CALLED STATING THAT SOMEONE HAD CALLED HER WITH LAB RESULTS AND SHE WAS JUST RETURNING THE CALL.

## 2022-03-06 NOTE — PROGRESS NOTES
Colposcopy of cervix    Date of procedure:  3/6/2022   Risks and benefits discussed? yes   All questions answered? yes   Consents given by: patient   Written consent obtained? yes   Pre-op indication: LGSIL (2/2022)  HGSIL (1/2021)  VAIN 2-3 (1/2021)          Procedure documentation:  The cervix was initially viewed colposcopically through a green filter.  The cervix was next bathed in acetic acid.   The findings were as follows:    Transformation zone seen? No   Findings: 1.  There was an area of mosaic change at the 8 o'clock position at the edge of the cervix  2. Cervix was essentially flush with the vagina consistent with multiple prior surgical procedures   Ectocervical biopsies: not obtained.   Endocervical curettage: not performed               Colposcopic Impression: 1. Suspected moderate dysplasia of cervix at portio reasonably flush with the apex of vagina  2. Inadequate colposcopy  3. Colposcopic findings are consistent with cytology       Plan:  Needs LEEP with associated probable laser to periphery of cervix         This note was electronically signed.    Tyson Iyer M.D.  March 6, 2022

## 2022-03-07 ENCOUNTER — OFFICE VISIT (OUTPATIENT)
Dept: OBSTETRICS AND GYNECOLOGY | Facility: CLINIC | Age: 52
End: 2022-03-07

## 2022-03-07 VITALS — BODY MASS INDEX: 39.86 KG/M2 | WEIGHT: 225 LBS | RESPIRATION RATE: 14 BRPM

## 2022-03-07 DIAGNOSIS — R87.612 LGSIL ON PAP SMEAR OF CERVIX: ICD-10-CM

## 2022-03-07 DIAGNOSIS — N89.3 VAGINAL DYSPLASIA: ICD-10-CM

## 2022-03-07 DIAGNOSIS — R87.613 HIGH GRADE INTREPITH LESION CYTO SMR CRVX (HGSIL): Primary | ICD-10-CM

## 2022-03-07 PROBLEM — I42.9 CARDIOMYOPATHY: Status: RESOLVED | Noted: 2021-01-11 | Resolved: 2022-03-07

## 2022-03-07 PROBLEM — R01.1 HEART MURMUR: Status: RESOLVED | Noted: 2020-01-25 | Resolved: 2022-03-07

## 2022-03-07 PROCEDURE — 57452 EXAM OF CERVIX W/SCOPE: CPT | Performed by: OBSTETRICS & GYNECOLOGY

## 2022-03-15 PROBLEM — I42.2 HYPERTROPHIC CARDIOMYOPATHY: Status: ACTIVE | Noted: 2017-01-01

## 2022-03-23 DIAGNOSIS — I10 HYPERTENSION, UNSPECIFIED TYPE: ICD-10-CM

## 2022-03-23 RX ORDER — ENALAPRIL MALEATE 5 MG/1
5 TABLET ORAL DAILY
Qty: 90 TABLET | Refills: 0 | Status: SHIPPED | OUTPATIENT
Start: 2022-03-23 | End: 2022-06-21

## 2022-04-01 DIAGNOSIS — E11.8 TYPE 2 DIABETES MELLITUS WITH UNSPECIFIED COMPLICATIONS: ICD-10-CM

## 2022-04-01 RX ORDER — INSULIN GLARGINE 100 [IU]/ML
INJECTION, SOLUTION SUBCUTANEOUS
Qty: 60 ML | Refills: 1 | Status: SHIPPED | OUTPATIENT
Start: 2022-04-01 | End: 2022-08-15

## 2022-05-11 ENCOUNTER — PRIOR AUTHORIZATION (OUTPATIENT)
Dept: ENDOCRINOLOGY | Facility: CLINIC | Age: 52
End: 2022-05-11

## 2022-05-11 ENCOUNTER — TELEPHONE (OUTPATIENT)
Dept: ENDOCRINOLOGY | Facility: CLINIC | Age: 52
End: 2022-05-11

## 2022-05-11 RX ORDER — BLOOD SUGAR DIAGNOSTIC
STRIP MISCELLANEOUS
Qty: 400 EACH | Refills: 3 | Status: SHIPPED | OUTPATIENT
Start: 2022-05-11 | End: 2022-06-06 | Stop reason: SDUPTHER

## 2022-05-11 RX ORDER — LANCETS 33 GAUGE
EACH MISCELLANEOUS
Qty: 400 EACH | Refills: 3 | Status: SHIPPED | OUTPATIENT
Start: 2022-05-11

## 2022-05-11 NOTE — TELEPHONE ENCOUNTER
PLEASE CALL IN FOR PT HER DELICA LANCETS AND VERIO TEST STRIPS TO [facility] IN [place]      PTS  NUMBER IF YOU NEED -[phone]

## 2022-05-11 NOTE — TELEPHONE ENCOUNTER
BRIAN HERNANDEZ Key: [id number] - PA Case ID: [id number] - Rx #: [id number] help? Call us at [phone]  Outcome  Approvedtoday  Your PA request has been approved. Additional information will be provided in the approval communication. (Message 1145)  Drug  Trulicity 4.5MG/0.5ML pen-injectors  Form  [facility] Electronic PA Form (2017 NCPDP)  Original Claim Info  76,75,75

## 2022-05-17 DIAGNOSIS — E11.65 UNCONTROLLED TYPE 2 DIABETES MELLITUS WITH HYPERGLYCEMIA: ICD-10-CM

## 2022-05-18 RX ORDER — INSULIN LISPRO 100 [IU]/ML
INJECTION, SOLUTION INTRAVENOUS; SUBCUTANEOUS
Qty: 45 ML | Refills: 0 | Status: SHIPPED | OUTPATIENT
Start: 2022-05-18 | End: 2022-11-17

## 2022-06-06 ENCOUNTER — TELEPHONE (OUTPATIENT)
Dept: ENDOCRINOLOGY | Facility: CLINIC | Age: 52
End: 2022-06-06

## 2022-06-06 RX ORDER — BLOOD SUGAR DIAGNOSTIC
STRIP MISCELLANEOUS
Qty: 400 EACH | Refills: 3 | Status: SHIPPED | OUTPATIENT
Start: 2022-06-06

## 2022-06-06 NOTE — TELEPHONE ENCOUNTER
PATIENT IS REQUESTING AN RX FOR ONE TOUCH VERIO TEST STRIPS. PLEASE SEND TO [facility]. SHE HAS THE METER BUT NEEDS TEST STRIPS, AS WELL.

## 2022-06-20 DIAGNOSIS — E11.65 UNCONTROLLED TYPE 2 DIABETES MELLITUS WITH HYPERGLYCEMIA: ICD-10-CM

## 2022-06-20 DIAGNOSIS — E78.2 MIXED HYPERLIPIDEMIA: ICD-10-CM

## 2022-06-20 RX ORDER — ATORVASTATIN CALCIUM 40 MG/1
TABLET, FILM COATED ORAL
Qty: 90 TABLET | Refills: 0 | Status: SHIPPED | OUTPATIENT
Start: 2022-06-20 | End: 2022-08-15 | Stop reason: SDUPTHER

## 2022-06-21 DIAGNOSIS — I10 HYPERTENSION, UNSPECIFIED TYPE: ICD-10-CM

## 2022-06-21 RX ORDER — ENALAPRIL MALEATE 5 MG/1
5 TABLET ORAL DAILY
Qty: 30 TABLET | Refills: 0 | Status: SHIPPED | OUTPATIENT
Start: 2022-06-21 | End: 2022-07-25

## 2022-06-21 NOTE — TELEPHONE ENCOUNTER
Rx Refill Note  Requested Prescriptions     Pending Prescriptions Disp Refills   • enalapril (VASOTEC) 5 MG tablet [Pharmacy Med Name: ENALAPRIL MALEATE 5 MG TABLET] 90 tablet 0     Sig: TAKE 1 TABLET BY MOUTH DAILY. WILL NEED TO BE SEEN FOR ANOTHER REFILL      Last office visit with prescribing clinician: 1/11/2021      Next office visit with prescribing clinician: Visit date not found            MARY UREÑA MA  06/21/22, 09:26 EDT

## 2022-07-15 ENCOUNTER — PREP FOR SURGERY (OUTPATIENT)
Dept: OTHER | Facility: HOSPITAL | Age: 52
End: 2022-07-15

## 2022-07-15 NOTE — H&P
Patricia Brown  : 1970  MRN: [id number]  CSN: [id number]    History and Physical    Subjective   Patricia Brown is a 52 y.o. year old  who present for LEEP due to HGSIL of cervix and VAIN 2.  Colposcopy demonstrates cervix essentially flush with apex of vagina from prior procedures.  Vaginal dysplasia most likely related to adjacent cervix/to the vaginal apex.  No additional vaginal lesions seen.    Past Medical History:   Diagnosis Date   • Gestational diabetes    • Bipolar 1 disorder (HCC)    • Type 2 diabetes mellitus (HCC)    • Hypertrophic cardiomyopathy (HCC)     Dx when in [place]   • Hyperlipidemia    • Essential hypertension 2020   • Hypertension, essential 2020   • Cervical dysplasia      Past Surgical History:   Procedure Laterality Date   • CERVIX LESION DESTRUCTION      CRYOTHERAPY   • CERVICAL CONIZATION, LEEP      Mild dysplasia   • MAMMO STEREOTACTIC BREAST BIOPSY 1ST W WO DEVICE      Done in [place]   • CERVICAL CONIZATION, LEEP       OB History    Para Term  AB Living   3 2 2 0 1 2   SAB IAB Ectopic Molar Multiple Live Births   1 0 0 0 0 2      # Outcome Date GA Lbr Murali/2nd Weight Sex Delivery Anes PTL Lv   3 Term 96   3799 g (8 lb 6 oz) F Vag-Spont   STEPHEN      Name: Monet   2 Term 89   3317 g (7 lb 5 oz) M Vag-Spont   STEPHEN      Complications: Getational diabetes mellitus      Name: Óscar   1 SAB               Obstetric Comments    - Óscar    - Trinidad     Social History    Tobacco Use      Smoking status: Former Smoker        Packs/day: 1.00        Years: 20.00        Pack years: 20        Types: Cigarettes        Start date:         Quit date:         Years since quittin.5      Smokeless tobacco: Never Used      Current Outpatient Medications:   •  atorvastatin (LIPITOR) 40 MG tablet, TAKE 1 TABLET AT BEDTIME, Disp: 90 tablet, Rfl: 0  •  B-D UF III MINI PEN NEEDLES 31G X 5 MM misc, USE 4  TIMES A DAY, Disp: 400 each, Rfl: 3  •  Blood Glucose Monitoring Suppl (Contour Next One) kit, Test glucose  4x per day, Disp: , Rfl:   •  buPROPion XL (WELLBUTRIN XL) 150 MG 24 hr tablet, Take 150 mg by mouth Daily., Disp: , Rfl:   •  Cariprazine HCl 4.5 MG capsule, Take  by mouth Daily., Disp: , Rfl:   •  clonazePAM (KlonoPIN) 1 MG tablet, , Disp: , Rfl:   •  Continuous Blood Gluc  (FreeStyle Alcon 2 [place]) device, 1 each Daily., Disp: 1 each, Rfl: 0  •  Continuous Blood Gluc Sensor (FreeStyle Alcon 2 Sensor) misc, 1 each Every 14 (Fourteen) Days., Disp: 6 each, Rfl: 2  •  Dulaglutide (Trulicity) 4.5 MG/0.5ML solution pen-injector, Inject 4.5 mg under the skin into the appropriate area as directed Every 7 (Seven) Days., Disp: 2 mL, Rfl: 3  •  enalapril (VASOTEC) 5 MG tablet, TAKE 1 TABLET BY MOUTH DAILY. WILL NEED TO BE SEEN FOR ANOTHER REFILL, Disp: 30 tablet, Rfl: 0  •  famciclovir (FAMVIR) 500 MG tablet, Take 1 tablet by mouth 3 (Three) Times a Day., Disp: 15 tablet, Rfl: 3  •  glucose blood (OneTouch Verio) test strip, Use to test blood sugar 4 times daily.  Dx E11.65, Disp: 400 each, Rfl: 3  •  Insulin Lispro, 1 Unit Dial, (HumaLOG KwikPen) 100 UNIT/ML solution pen-injector, INJECT SUBCUTANEOUSLY 12  UNITS 15 MINUTES BEFORE  MEALS PLUS CORRECTION 1:50  FOR BLOOD GLUCOSE &gt;150. MAX DAILY DOSE: 50 UNITS, Disp: 45 mL, Rfl: 0  •  Lantus SoloStar 100 UNIT/ML injection pen, INJECT 60 UNITS UNDER THE SKIN INTO THE APPROPRIATE AREA AS DIRECTED EVERY NIGHT., Disp: 60 mL, Rfl: 1  •  lithium carbonate 300 MG capsule, Take 300 mg by mouth 2 (Two) Times a Day With Meals. 2 in am, 3 at night, Disp: , Rfl:   •  OneTouch Delica Lancets 33G misc, Use to test blood sugar 4 times daily.  Dx E11.65, Disp: 400 each, Rfl: 3  •  topiramate (TOPAMAX) 100 MG tablet, Take 100 mg by mouth 2 (Two) Times a Day., Disp: , Rfl:     No Known Allergies    Review of Systems   Constitutional: Negative for chills, fever and unexpected  weight change.   HENT: Negative for ear pain, facial swelling, sinus pressure, sneezing and sore throat.    Respiratory: Negative for cough, shortness of breath and wheezing.    Cardiovascular: Negative for chest pain and palpitations.   Hematological: Does not bruise/bleed easily.         Objective     Vital Signs: See nursing documentation   General: well developed; well nourished  no acute distress   Mental status: Alert and oriented   Heart: Not performed.   Lungs: breathing is unlabored   Abdomen: soft, non-tender; no masses  no umbilical or inguinal hernias are present  no hepato-splenomegaly   Pelvis: Not performed.        Assessment   1. HGSIL of cervix  2. History of vaginal dysplasia most likely related to multiple prior cervical procedures and adjacent vaginal epithelium to cervical findings     Plan   1. LEEP centrally with anticipated peripheral laser ablation of dysplasia involving vaginal fornix        Tyson Iyer MD       (Pt's PCP is Mariela Hall, [place])

## 2022-07-18 ENCOUNTER — LAB (OUTPATIENT)
Dept: PREADMISSION TESTING | Facility: HOSPITAL | Age: 52
End: 2022-07-18

## 2022-07-18 ENCOUNTER — OFFICE VISIT (OUTPATIENT)
Dept: OBSTETRICS AND GYNECOLOGY | Facility: CLINIC | Age: 52
End: 2022-07-18

## 2022-07-18 VITALS — WEIGHT: 228 LBS | RESPIRATION RATE: 14 BRPM | BODY MASS INDEX: 40.39 KG/M2

## 2022-07-18 DIAGNOSIS — R87.613 HIGH GRADE INTREPITH LESION CYTO SMR CRVX (HGSIL): ICD-10-CM

## 2022-07-18 DIAGNOSIS — N89.3 VAGINAL DYSPLASIA: ICD-10-CM

## 2022-07-18 DIAGNOSIS — Z01.818 PREOP EXAMINATION: Primary | ICD-10-CM

## 2022-07-18 DIAGNOSIS — R87.612 LGSIL ON PAP SMEAR OF CERVIX: ICD-10-CM

## 2022-07-18 PROBLEM — I10 ESSENTIAL HYPERTENSION: Status: ACTIVE | Noted: 2019-01-01

## 2022-07-18 LAB — SARS-COV-2 RNA PNL SPEC NAA+PROBE: NOT DETECTED

## 2022-07-18 PROCEDURE — U0004 COV-19 TEST NON-CDC HGH THRU: HCPCS

## 2022-07-18 PROCEDURE — S0260 H&P FOR SURGERY: HCPCS | Performed by: OBSTETRICS & GYNECOLOGY

## 2022-07-18 NOTE — PROGRESS NOTES
Subjective   Patricia Brown is a 52 y.o. 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  Colposcopy has been done.  Cervix is flush with the apex of the vagina.  Colposcopy was inadequate due to cervical stenosis.    Past Medical History:   Diagnosis Date   • Gestational diabetes    • Bipolar 1 disorder (HCC)    • Type 2 diabetes mellitus (HCC)    • Hypertrophic cardiomyopathy (HCC) 2017    Dx when in [place]   • Hyperlipidemia 2018   • Essential hypertension 2019     Past Surgical History:   Procedure Laterality Date   • CERVIX LESION DESTRUCTION      CRYOTHERAPY   • CERVICAL CONIZATION, LEEP      Mild dysplasia   • MAMMO STEREOTACTIC BREAST BIOPSY 1ST W WO DEVICE  2004    Done in [place]   • CERVICAL CONIZATION, LEEP       OB History    Para Term  AB Living   3 2 2 0 1 2   SAB IAB Ectopic Molar Multiple Live Births   1 0 0 0 0 2      # Outcome Date GA Lbr Murali/2nd Weight Sex Delivery Anes PTL Lv   3 Term 96   3799 g (8 lb 6 oz) F Vag-Spont   STEPHEN      Name: Monet   2 Term 94   3317 g (7 lb 5 oz) M Vag-Spont   STEPHEN      Complications: Getational diabetes mellitus      Name: Óscar   1 SAB               Obstetric Comments    - Óscar    - Trinidad     Social History     Tobacco Use   Smoking Status Former Smoker   • Packs/day: 1.00   • Years: 20.00   • Pack years: 20.00   • Types: Cigarettes   • Start date:    • Quit date:    • Years since quittin.5   Smokeless Tobacco Never Used     Social History     Substance and Sexual Activity   Alcohol Use Not Currently   • Alcohol/week: 0.0 standard drinks     Social History     Substance and Sexual Activity   Drug Use Never     Prior to Admission medications    Medication Sig Start Date End Date Taking? Authorizing Provider   atorvastatin (LIPITOR) 40 MG tablet TAKE 1 TABLET AT BEDTIME 22  Yes Callie Ramirez,    B-D UF III MINI PEN  NEEDLES 31G X 5 MM misc USE 4 TIMES A DAY 1/24/22  Yes Kasi Bansal PA-C   Blood Glucose Monitoring Suppl (Contour Next One) kit Test glucose  4x per day   Yes Chase Barrientos MD   buPROPion XL (WELLBUTRIN XL) 150 MG 24 hr tablet Take 150 mg by mouth Daily.   Yes Chase Barrientos MD   Cariprazine HCl 4.5 MG capsule Take  by mouth Daily.   Yes Chase Barrientos MD   clonazePAM (KlonoPIN) 1 MG tablet  1/18/21  Yes Chase Barrientos MD   Continuous Blood Gluc  (FreeStyle Alcon 2 [place]) device 1 each Daily. 12/13/21  Yes Callie Ramirez DO   Continuous Blood Gluc Sensor (FreeStyle Alcon 2 Sensor) misc 1 each Every 14 (Fourteen) Days. 12/13/21  Yes Callie Ramirez DO   Dulaglutide (Trulicity) 4.5 MG/0.5ML solution pen-injector Inject 4.5 mg under the skin into the appropriate area as directed Every 7 (Seven) Days. 2/15/22  Yes Callie Ramirez DO   enalapril (VASOTEC) 5 MG tablet TAKE 1 TABLET BY MOUTH DAILY. WILL NEED TO BE SEEN FOR ANOTHER REFILL 6/21/22  Yes Mariela Hall APRN   famciclovir (FAMVIR) 500 MG tablet Take 1 tablet by mouth 3 (Three) Times a Day. 1/11/21  Yes Mariela Hall APRN   glucose blood (OneTouch Verio) test strip Use to test blood sugar 4 times daily.  Dx E11.65 6/6/22  Yes Callie Ramirez DO   Insulin Lispro, 1 Unit Dial, (HumaLOG KwikPen) 100 UNIT/ML solution pen-injector INJECT SUBCUTANEOUSLY 12  UNITS 15 MINUTES BEFORE  MEALS PLUS CORRECTION 1:50  FOR BLOOD GLUCOSE &gt;150. MAX DAILY DOSE: 50 UNITS 5/18/22  Yes Callie Ramirez DO   Lantus SoloStar 100 UNIT/ML injection pen INJECT 60 UNITS UNDER THE SKIN INTO THE APPROPRIATE AREA AS DIRECTED EVERY NIGHT. 4/1/22  Yes Melissa Casanova PA   lithium carbonate 300 MG capsule Take 300 mg by mouth 2 (Two) Times a Day With Meals. 2 in am, 3 at night   Yes Floyd Historical, MD   OneTouch Delica Lancets 33G misc Use to test blood sugar 4 times daily.  Dx E11.65 5/11/22  Yes  Callie Ramirez,    topiramate (TOPAMAX) 100 MG tablet Take 100 mg by mouth 2 (Two) Times a Day.   Yes Provider, Chase, MD     No Known Allergies    Review of Systems      Objective   Resp 14   Wt 103 kg (228 lb)   LMP 06/25/2022 (Approximate)   Breastfeeding No   BMI 40.39 kg/m²   General: well developed; well nourished  no acute distress   Heart: Not performed.   Lungs: breathing is unlabored   Abdomen: soft, non-tender; no masses  no umbilical or inguinal hernias are present  no hepato-splenomegaly   Pelvis: Not performed.        Assessment   1. HGSIL with associated vaginal dysplasia     Plan   1. Central LEEP with peripheral laser ablation of cervical and vaginal dysplasia          Tyson Iyer M.D.  7/18/2022       (Pt's PCP is Mariela Hall, [facility])

## 2022-07-20 ENCOUNTER — LAB REQUISITION (OUTPATIENT)
Dept: LAB | Facility: HOSPITAL | Age: 52
End: 2022-07-20

## 2022-07-20 ENCOUNTER — OUTSIDE FACILITY SERVICE (OUTPATIENT)
Dept: OBSTETRICS AND GYNECOLOGY | Facility: CLINIC | Age: 52
End: 2022-07-20

## 2022-07-20 DIAGNOSIS — R87.613 HIGH GRADE SQUAMOUS INTRAEPITHELIAL LESION ON CYTOLOGIC SMEAR OF CERVIX (HGSIL): ICD-10-CM

## 2022-07-20 PROBLEM — Z98.890 POST-OPERATIVE STATE: Status: ACTIVE | Noted: 2022-07-20

## 2022-07-20 PROCEDURE — 57061 DESTRUCTION VAG LESIONS SMPL: CPT | Performed by: OBSTETRICS & GYNECOLOGY

## 2022-07-20 PROCEDURE — 88307 TISSUE EXAM BY PATHOLOGIST: CPT | Performed by: OBSTETRICS & GYNECOLOGY

## 2022-07-20 PROCEDURE — 57522 CONIZATION OF CERVIX: CPT | Performed by: OBSTETRICS & GYNECOLOGY

## 2022-07-22 LAB
CYTO UR: NORMAL
LAB AP CASE REPORT: NORMAL
LAB AP CLINICAL INFORMATION: NORMAL
PATH REPORT.FINAL DX SPEC: NORMAL
PATH REPORT.GROSS SPEC: NORMAL

## 2022-07-25 DIAGNOSIS — I10 HYPERTENSION, UNSPECIFIED TYPE: ICD-10-CM

## 2022-07-25 RX ORDER — ENALAPRIL MALEATE 5 MG/1
TABLET ORAL
Qty: 30 TABLET | Refills: 0 | Status: SHIPPED | OUTPATIENT
Start: 2022-07-25 | End: 2022-08-29

## 2022-07-25 NOTE — TELEPHONE ENCOUNTER
Last office visit with prescribing clinician:1/11/21  Next office visit with prescribing clinician:N/A

## 2022-07-27 ENCOUNTER — TELEPHONE (OUTPATIENT)
Dept: ENDOCRINOLOGY | Facility: CLINIC | Age: 52
End: 2022-07-27

## 2022-07-27 DIAGNOSIS — E11.8 TYPE 2 DIABETES MELLITUS WITH UNSPECIFIED COMPLICATIONS: ICD-10-CM

## 2022-07-27 RX ORDER — DULAGLUTIDE 4.5 MG/.5ML
4.5 INJECTION, SOLUTION SUBCUTANEOUS
Qty: 2 ML | Refills: 0 | Status: SHIPPED | OUTPATIENT
Start: 2022-07-27 | End: 2022-08-15 | Stop reason: SDUPTHER

## 2022-08-04 ENCOUNTER — OFFICE VISIT (OUTPATIENT)
Dept: OBSTETRICS AND GYNECOLOGY | Facility: CLINIC | Age: 52
End: 2022-08-04

## 2022-08-04 VITALS
WEIGHT: 229 LBS | DIASTOLIC BLOOD PRESSURE: 76 MMHG | BODY MASS INDEX: 40.57 KG/M2 | SYSTOLIC BLOOD PRESSURE: 122 MMHG | RESPIRATION RATE: 14 BRPM

## 2022-08-04 DIAGNOSIS — Z98.890 POST-OPERATIVE STATE: Primary | ICD-10-CM

## 2022-08-04 PROCEDURE — 99024 POSTOP FOLLOW-UP VISIT: CPT | Performed by: OBSTETRICS & GYNECOLOGY

## 2022-08-04 NOTE — PROGRESS NOTES
Subjective   Chief Complaint   Patient presents with   • Post-op     Patricia Brown is a 52 y.o. year old  presenting to be seen for her post-operative visit.  Currently she reports no problems with eating, bowel movements, voiding, or wound drainage and pain is well controlled.    The pathology results from her procedure are in Patricia's record and are ONOFRE 1 with clear margins.      OTHER THINGS SHE WANTS TO DISCUSS TODAY:  Nothing else    The following portions of the patient's history were reviewed and updated as appropriate:current medications and allergies       Objective   /76   Resp 14   Wt 104 kg (229 lb)   LMP 2022 (Approximate)   Breastfeeding No   BMI 40.57 kg/m²     General:  well developed; well nourished  no acute distress   Abdomen: soft, non-tender; no masses  no umbilical or inguinal hernias are present  no hepato-splenomegaly   Pelvis: Clinical staff was present for exam  External genitalia:  normal appearance of the external genitalia including Bartholin's and [place]'s glands.  :  urethral meatus normal;  Vaginal:  normal pink mucosa without prolapse or lesions.  Cervix:  normal appearance.          Assessment   1. S/P LEEP     Plan   1. May return to full activity with no restrictions  2. The importance of keeping all planned follow-up and taking all medications as prescribed was emphasized.  3. Follow up for repeat PAP smear 6 months.         This note was electronically signed.    Tyson Iyer M.D.  2022    Part of this note may be an electronic transcription/translation of spoken language to printed text using the Dragon Dictation System.

## 2022-08-15 ENCOUNTER — OFFICE VISIT (OUTPATIENT)
Dept: ENDOCRINOLOGY | Facility: CLINIC | Age: 52
End: 2022-08-15

## 2022-08-15 VITALS
BODY MASS INDEX: 40.93 KG/M2 | HEART RATE: 83 BPM | DIASTOLIC BLOOD PRESSURE: 73 MMHG | HEIGHT: 63 IN | SYSTOLIC BLOOD PRESSURE: 120 MMHG | WEIGHT: 231 LBS | OXYGEN SATURATION: 98 %

## 2022-08-15 DIAGNOSIS — E11.65 TYPE 2 DIABETES MELLITUS WITH HYPERGLYCEMIA, WITH LONG-TERM CURRENT USE OF INSULIN: Primary | ICD-10-CM

## 2022-08-15 DIAGNOSIS — Z79.4 TYPE 2 DIABETES MELLITUS WITH HYPERGLYCEMIA, WITH LONG-TERM CURRENT USE OF INSULIN: Primary | ICD-10-CM

## 2022-08-15 DIAGNOSIS — E78.2 MIXED HYPERLIPIDEMIA: ICD-10-CM

## 2022-08-15 LAB
EXPIRATION DATE: NORMAL
EXPIRATION DATE: NORMAL
GLUCOSE BLDC GLUCOMTR-MCNC: 88 MG/DL (ref 70–130)
HBA1C MFR BLD: 7.7 %
Lab: NORMAL
Lab: NORMAL

## 2022-08-15 PROCEDURE — 99214 OFFICE O/P EST MOD 30 MIN: CPT | Performed by: INTERNAL MEDICINE

## 2022-08-15 PROCEDURE — 83036 HEMOGLOBIN GLYCOSYLATED A1C: CPT | Performed by: INTERNAL MEDICINE

## 2022-08-15 PROCEDURE — 82947 ASSAY GLUCOSE BLOOD QUANT: CPT | Performed by: INTERNAL MEDICINE

## 2022-08-15 RX ORDER — DULAGLUTIDE 4.5 MG/.5ML
4.5 INJECTION, SOLUTION SUBCUTANEOUS
Qty: 6 ML | Refills: 1 | Status: SHIPPED | OUTPATIENT
Start: 2022-08-15 | End: 2022-12-15 | Stop reason: ALTCHOICE

## 2022-08-15 RX ORDER — INSULIN ASPART INJECTION 100 [IU]/ML
INJECTION, SOLUTION SUBCUTANEOUS
Qty: 75 ML | Refills: 1 | Status: SHIPPED | OUTPATIENT
Start: 2022-08-15

## 2022-08-15 RX ORDER — ATORVASTATIN CALCIUM 40 MG/1
40 TABLET, FILM COATED ORAL
Qty: 90 TABLET | Refills: 1 | Status: SHIPPED | OUTPATIENT
Start: 2022-08-15 | End: 2023-03-27

## 2022-08-15 RX ORDER — INSULIN GLARGINE 100 [IU]/ML
60 INJECTION, SOLUTION SUBCUTANEOUS NIGHTLY
Qty: 60 ML | Refills: 1 | Status: SHIPPED | OUTPATIENT
Start: 2022-08-15 | End: 2023-03-29

## 2022-08-15 NOTE — PROGRESS NOTES
"Chief Complaint   Patient presents with   • Diabetes          HPI   Patricia Brown is a 52 y.o. female had concerns including Diabetes.    She is checking blood sugars 2+ times per day. BGs mostly in the 100s.  Current medications for diabetes include trulicity 4.5 mg weekly, lantus 60 units once daily, humalog 12 units with meals and 2:50>150.    A1c has improved to 7.7 from 8.3 at her last visit in December.    The following portions of the patient's history were reviewed and updated as appropriate: allergies, current medications, past family history, past medical history, past social history, past surgical history and problem list.      Review of Systems   Endocrine:        See HPI        Physical Exam  Vitals reviewed.   Constitutional:       Appearance: Normal appearance. She is obese.      Comments: Body mass index is 40.92 kg/m².   Cardiovascular:      Rate and Rhythm: Normal rate.      Pulses:           Dorsalis pedis pulses are 2+ on the left side.   Pulmonary:      Effort: Pulmonary effort is normal.   Feet:      Right foot:      Skin integrity: Dry skin present.      Toenail Condition: Right toenails are abnormally thick.      Left foot:      Skin integrity: Dry skin present.      Toenail Condition: Left toenails are abnormally thick.      Comments: Diabetic Foot Exam Performed and Monofilament Test Performed    Monofilament 5/5 bilaterally  Neurological:      General: No focal deficit present.      Mental Status: She is alert. Mental status is at baseline.   Psychiatric:         Mood and Affect: Mood normal.         Behavior: Behavior normal.        /73   Pulse 83   Ht 160 cm (63\")   Wt 105 kg (231 lb)   LMP 07/23/2022 (Approximate)   SpO2 98%   BMI 40.92 kg/m²      Labs and imaging    CMP:  Lab Results   Component Value Date    BUN 10 05/24/2021    CREATININE 0.76 05/24/2021    EGFRIFNONA 81 05/24/2021    BCR 13.2 05/24/2021     05/24/2021    K 4.7 05/24/2021    CO2 21.1 (L) " 05/24/2021    CALCIUM 8.8 05/24/2021    ALBUMIN 4.40 05/24/2021    BILITOT 0.2 05/24/2021    ALKPHOS 84 05/24/2021    AST 36 (H) 05/24/2021    ALT 18 05/24/2021     Lipid Panel:  Lab Results   Component Value Date    CHOL 135 05/24/2021    TRIG 171 (H) 05/24/2021    HDL 53 05/24/2021    VLDL 28 05/24/2021    LDL 54 05/24/2021     HbA1c:  Lab Results   Component Value Date    HGBA1C 7.7 08/15/2022    HGBA1C 8.3 12/13/2021     Glucose:    Lab Results   Component Value Date    POCGLU 88 08/15/2022     Microalbumin:  Lab Results   Component Value Date    MALBCRERATIO 14.9 09/13/2021     TSH:  Lab Results   Component Value Date    TSH 1.270 01/11/2021       Assessment and plan  Diagnoses and all orders for this visit:    1. Type 2 diabetes mellitus with hyperglycemia, with long-term current use of insulin (HCC) (Primary)  Uncontrolled with hyperglycemia though A1c is improved to 7.7.  No other complications related to diabetes.  Recommend more consistent glucose monitoring.  No metformin due to history of GI intolerance.  SGLT2 inhibitor discontinued due to history of polyuria.  Consider trial again in the future.  Continue Trulicity 4.5 mg weekly.  Insulin formulary is changed.  Continue Basaglar 60 units once daily, Fiasp 20 to 24 units with meals +2: 50 greater than 150.  Labs are due and an order was entered.  Monofilament updated today.  Ophtho exam is up-to-date from last November.  -     POC Glucose, Blood  -     POC Glycosylated Hemoglobin (Hb A1C)  -     Insulin Aspart, w/Niacinamide, (Fiasp FlexTouch) 100 UNIT/ML solution pen-injector; 20-24 units with meals plus 2:50>150, MDD 80 units  Dispense: 75 mL; Refill: 1  -     Insulin Glargine (BASAGLAR KWIKPEN) 100 UNIT/ML injection pen; Inject 60 Units under the skin into the appropriate area as directed Every Night.  Dispense: 60 mL; Refill: 1  -     Dulaglutide (Trulicity) 4.5 MG/0.5ML solution pen-injector; Inject 0.5 mL under the skin into the appropriate area  as directed Every 7 (Seven) Days.  Dispense: 6 mL; Refill: 1  -     CBC (No Diff); Future  -     Comprehensive Metabolic Panel; Future  -     Lipid Panel; Future  -     Microalbumin / Creatinine Urine Ratio - Urine, Clean Catch; Future  -     TSH; Future    2. Mixed hyperlipidemia  On atorvastatin 40 mg daily.  Due for lipid panel.  Monitor yearly.  -     atorvastatin (LIPITOR) 40 MG tablet; Take 1 tablet by mouth every night at bedtime.  Dispense: 90 tablet; Refill: 1         Return in about 3 months (around 11/15/2022) for next scheduled follow up. The patient was instructed to contact the clinic with any interval questions or concerns.    Callie Ramirez, DO   Endocrinologist    Please note that portions of this note were completed with a voice recognition program.

## 2022-08-27 DIAGNOSIS — I10 HYPERTENSION, UNSPECIFIED TYPE: ICD-10-CM

## 2022-08-29 RX ORDER — ENALAPRIL MALEATE 5 MG/1
TABLET ORAL
Qty: 30 TABLET | Refills: 0 | Status: SHIPPED | OUTPATIENT
Start: 2022-08-29 | End: 2022-10-01

## 2022-08-29 NOTE — TELEPHONE ENCOUNTER
Rx Refill Note  Requested Prescriptions     Pending Prescriptions Disp Refills   • enalapril (VASOTEC) 5 MG tablet [Pharmacy Med Name: ENALAPRIL MALEATE 5 MG TABLET] 30 tablet 0     Sig: TAKE 1 TABLET BY MOUTH EVERY DAY      Last office visit with prescribing clinician: 1/11/2021      Next office visit with prescribing clinician: Visit date not found            Vicky Santoro MA  08/29/22, 08:41 EDT

## 2022-09-16 ENCOUNTER — TELEPHONE (OUTPATIENT)
Dept: ENDOCRINOLOGY | Facility: CLINIC | Age: 52
End: 2022-09-16

## 2022-09-16 NOTE — TELEPHONE ENCOUNTER
PT CALLED REQUESTING TRULICITY (90 DAY RX) TO BE SENT IN TO [facility] IN [place], [place]

## 2022-10-01 DIAGNOSIS — I10 HYPERTENSION, UNSPECIFIED TYPE: ICD-10-CM

## 2022-10-01 RX ORDER — ENALAPRIL MALEATE 5 MG/1
TABLET ORAL
Qty: 30 TABLET | Refills: 0 | Status: SHIPPED | OUTPATIENT
Start: 2022-10-01 | End: 2022-11-05 | Stop reason: SDUPTHER

## 2022-10-01 NOTE — TELEPHONE ENCOUNTER
Rx Refill Note  Requested Prescriptions     Pending Prescriptions Disp Refills   • enalapril (VASOTEC) 5 MG tablet [Pharmacy Med Name: ENALAPRIL MALEATE 5 MG TABLET] 30 tablet 0     Sig: TAKE 1 TABLET BY MOUTH EVERY DAY      Last office visit with prescribing clinician: 1/11/2021      Next office visit with prescribing clinician: Visit date not found            Emmanuel Resendiz MA  10/01/22, 10:52 EDT

## 2022-10-12 ENCOUNTER — TELEPHONE (OUTPATIENT)
Dept: FAMILY MEDICINE CLINIC | Facility: CLINIC | Age: 52
End: 2022-10-12

## 2022-10-12 NOTE — TELEPHONE ENCOUNTER
Caller: Patricia Brown    Relationship to patient: Self    Best call back number: [phone]    Type of visit: PHYSICAL    Requested date: SATURDAY    Additional notes:PATIENT HAS BEEN SCHEDULED WITH A PROVIDER WHO IS NOT PCP

## 2022-10-25 ENCOUNTER — PRIOR AUTHORIZATION (OUTPATIENT)
Dept: ENDOCRINOLOGY | Facility: CLINIC | Age: 52
End: 2022-10-25

## 2022-10-25 NOTE — TELEPHONE ENCOUNTER
BIRAN HERNANDEZ Key: [id number] - PA Case ID: [id number] help? Call us at [phone]  Outcome  Approvedtoday  Your PA request has been approved. Additional information will be provided in the approval communication. (Message 1145)  Drug  [employer] 100-33UNT-MCG/ML pen-injectors  Form  [facility] Electronic PA Form (2017 [employer])

## 2022-11-05 ENCOUNTER — LAB (OUTPATIENT)
Dept: LAB | Facility: HOSPITAL | Age: 52
End: 2022-11-05

## 2022-11-05 ENCOUNTER — OFFICE VISIT (OUTPATIENT)
Dept: FAMILY MEDICINE CLINIC | Facility: CLINIC | Age: 52
End: 2022-11-05

## 2022-11-05 VITALS
OXYGEN SATURATION: 99 % | HEART RATE: 82 BPM | RESPIRATION RATE: 21 BRPM | HEIGHT: 63 IN | DIASTOLIC BLOOD PRESSURE: 86 MMHG | WEIGHT: 238 LBS | SYSTOLIC BLOOD PRESSURE: 130 MMHG | TEMPERATURE: 98.2 F | BODY MASS INDEX: 42.17 KG/M2

## 2022-11-05 DIAGNOSIS — L81.9 ATYPICAL PIGMENTED SKIN LESION: ICD-10-CM

## 2022-11-05 DIAGNOSIS — I10 ESSENTIAL HYPERTENSION: Chronic | ICD-10-CM

## 2022-11-05 DIAGNOSIS — K52.9 CHRONIC DIARRHEA: ICD-10-CM

## 2022-11-05 DIAGNOSIS — Z76.0 MEDICATION REFILL: ICD-10-CM

## 2022-11-05 DIAGNOSIS — L91.8 ACQUIRED SKIN TAG: ICD-10-CM

## 2022-11-05 DIAGNOSIS — R21 RASH AND NONSPECIFIC SKIN ERUPTION: ICD-10-CM

## 2022-11-05 DIAGNOSIS — Z23 NEED FOR INFLUENZA VACCINATION: ICD-10-CM

## 2022-11-05 DIAGNOSIS — Z12.31 ENCOUNTER FOR SCREENING MAMMOGRAM FOR MALIGNANT NEOPLASM OF BREAST: ICD-10-CM

## 2022-11-05 DIAGNOSIS — Z00.00 ANNUAL PHYSICAL EXAM: Primary | ICD-10-CM

## 2022-11-05 DIAGNOSIS — Z00.00 ANNUAL PHYSICAL EXAM: ICD-10-CM

## 2022-11-05 PROBLEM — E78.5 HYPERLIPIDEMIA: Chronic | Status: ACTIVE | Noted: 2018-01-01

## 2022-11-05 PROBLEM — I42.2 HYPERTROPHIC CARDIOMYOPATHY: Chronic | Status: ACTIVE | Noted: 2017-01-01

## 2022-11-05 PROBLEM — K57.90 DIVERTICULOSIS: Chronic | Status: ACTIVE | Noted: 2022-11-05

## 2022-11-05 PROBLEM — K57.90 DIVERTICULOSIS: Status: ACTIVE | Noted: 2022-11-05

## 2022-11-05 LAB
ALBUMIN SERPL-MCNC: 4.6 G/DL (ref 3.5–5.2)
ALBUMIN/GLOB SERPL: 1.7 G/DL
ALP SERPL-CCNC: 95 U/L (ref 39–117)
ALT SERPL W P-5'-P-CCNC: 17 U/L (ref 1–33)
ANION GAP SERPL CALCULATED.3IONS-SCNC: 10.2 MMOL/L (ref 5–15)
AST SERPL-CCNC: 47 U/L (ref 1–32)
BILIRUB SERPL-MCNC: 0.2 MG/DL (ref 0–1.2)
BILIRUB UR QL STRIP: NEGATIVE
BUN SERPL-MCNC: 12 MG/DL (ref 6–20)
BUN/CREAT SERPL: 12.9 (ref 7–25)
CALCIUM SPEC-SCNC: 9.1 MG/DL (ref 8.6–10.5)
CHLORIDE SERPL-SCNC: 110 MMOL/L (ref 98–107)
CHOLEST SERPL-MCNC: 155 MG/DL (ref 0–200)
CLARITY UR: CLEAR
CO2 SERPL-SCNC: 21.8 MMOL/L (ref 22–29)
COLOR UR: YELLOW
CREAT SERPL-MCNC: 0.93 MG/DL (ref 0.57–1)
DEPRECATED RDW RBC AUTO: 43 FL (ref 37–54)
EGFRCR SERPLBLD CKD-EPI 2021: 75 ML/MIN/1.73
ERYTHROCYTE [DISTWIDTH] IN BLOOD BY AUTOMATED COUNT: 12.8 % (ref 12.3–15.4)
GLOBULIN UR ELPH-MCNC: 2.7 GM/DL
GLUCOSE SERPL-MCNC: 160 MG/DL (ref 65–99)
GLUCOSE UR STRIP-MCNC: NEGATIVE MG/DL
HCT VFR BLD AUTO: 46.3 % (ref 34–46.6)
HDLC SERPL-MCNC: 62 MG/DL (ref 40–60)
HGB BLD-MCNC: 14.4 G/DL (ref 12–15.9)
HGB UR QL STRIP.AUTO: NEGATIVE
KETONES UR QL STRIP: NEGATIVE
LDLC SERPL CALC-MCNC: 60 MG/DL (ref 0–100)
LDLC/HDLC SERPL: 0.84 {RATIO}
LEUKOCYTE ESTERASE UR QL STRIP.AUTO: NEGATIVE
MCH RBC QN AUTO: 28.6 PG (ref 26.6–33)
MCHC RBC AUTO-ENTMCNC: 31.1 G/DL (ref 31.5–35.7)
MCV RBC AUTO: 91.9 FL (ref 79–97)
NITRITE UR QL STRIP: NEGATIVE
PH UR STRIP.AUTO: 6.5 [PH] (ref 5–8)
PLATELET # BLD AUTO: 239 10*3/MM3 (ref 140–450)
PMV BLD AUTO: 12.2 FL (ref 6–12)
POTASSIUM SERPL-SCNC: 4.3 MMOL/L (ref 3.5–5.2)
PROT SERPL-MCNC: 7.3 G/DL (ref 6–8.5)
PROT UR QL STRIP: NEGATIVE
RBC # BLD AUTO: 5.04 10*6/MM3 (ref 3.77–5.28)
SODIUM SERPL-SCNC: 142 MMOL/L (ref 136–145)
SP GR UR STRIP: 1.01 (ref 1–1.03)
TRIGL SERPL-MCNC: 205 MG/DL (ref 0–150)
TSH SERPL DL<=0.05 MIU/L-ACNC: 1.41 UIU/ML (ref 0.27–4.2)
UROBILINOGEN UR QL STRIP: NORMAL
VLDLC SERPL-MCNC: 33 MG/DL (ref 5–40)
WBC NRBC COR # BLD: 11.6 10*3/MM3 (ref 3.4–10.8)

## 2022-11-05 PROCEDURE — 90686 IIV4 VACC NO PRSV 0.5 ML IM: CPT | Performed by: NURSE PRACTITIONER

## 2022-11-05 PROCEDURE — 81003 URINALYSIS AUTO W/O SCOPE: CPT

## 2022-11-05 PROCEDURE — 99396 PREV VISIT EST AGE 40-64: CPT | Performed by: NURSE PRACTITIONER

## 2022-11-05 PROCEDURE — 90471 IMMUNIZATION ADMIN: CPT | Performed by: NURSE PRACTITIONER

## 2022-11-05 PROCEDURE — 11200 RMVL SKIN TAGS UP TO&INC 15: CPT | Performed by: NURSE PRACTITIONER

## 2022-11-05 PROCEDURE — 80061 LIPID PANEL: CPT

## 2022-11-05 PROCEDURE — 80050 GENERAL HEALTH PANEL: CPT

## 2022-11-05 RX ORDER — MONTELUKAST SODIUM 4 MG/1
1 TABLET, CHEWABLE ORAL 2 TIMES DAILY PRN
Qty: 60 TABLET | Refills: 3 | Status: SHIPPED | OUTPATIENT
Start: 2022-11-05

## 2022-11-05 RX ORDER — CLOTRIMAZOLE 1 %
1 CREAM (GRAM) TOPICAL 2 TIMES DAILY
Qty: 30 G | Refills: 0 | Status: SHIPPED | OUTPATIENT
Start: 2022-11-05 | End: 2022-11-15

## 2022-11-05 RX ORDER — ENALAPRIL MALEATE 5 MG/1
5 TABLET ORAL DAILY
Qty: 90 TABLET | Refills: 3 | Status: SHIPPED | OUTPATIENT
Start: 2022-11-05

## 2022-11-05 NOTE — PROGRESS NOTES
Follow Up Office Note     Patient Name: Patricia Brown  : 1970   MRN: [id number]     Chief Complaint:    Chief Complaint   Patient presents with   • Annual Exam     Pt has a red spot on her left arm she would like looked at and also little red bumps on both hands.        History of Present Illness: Patricia Brown is a 52 y.o. female who presents today for annual physical exam. She reports that overall she feels well. She does have some complaints/health concerns today however.    Patient has chronic DM2 which has been improving with medication therapy. Her last A1C was 7.7 in August of this year. Patient follows with [facility] for management.    Patient has a red circular skin lesion on left forearm that she feels has been increasing in size. Patient reports that she has had the lesion for several years. She states that it does not itch and is not painful.     Patient c/o chronic diarrhea x several years. She states that she has had to take Imodium daily to control her symptoms. She reports that she saw a gastroenterologist and had EGD and colonoscopy approximately five years ago when she was living in [place], [place]. She reports that the results were normal and was informed that there was no findings to explain her diarrhea. She states that she has been taking Imodium since.    Patient c/o skin tag right abdominal wall that has been catching on her clothes and she would like to have it removed.     Patient is fasting, and wishes to have annual fasting labs today.  Patient was advised that [facility] and [facility] are closed on , but she can get her labs done at [facility]. Patient lives in [place], [place] and states that she wants to go ahead and get labs while she is in [place] and will go today to [facility] to have her labs done.     Subjective      Review of Systems:   Review of Systems   Constitutional: Negative.    HENT:  Negative.    Eyes: Negative.         Eye exam up to date.   Respiratory: Negative.    Cardiovascular: Negative.    Gastrointestinal: Positive for diarrhea (chronic). Negative for abdominal pain, anal bleeding, blood in stool, nausea and vomiting.   Genitourinary: Negative.    Musculoskeletal: Negative.    Skin: Positive for color change and rash (New development of red bumps on right hand).        Skin lesion left forearm.  Skin tag on right abdomen.   Neurological: Negative.    Psychiatric/Behavioral: Negative.         Past Medical History:   Past Medical History:   Diagnosis Date   • Bipolar 1 disorder (HCC) 2002   • Essential hypertension 2019   • Gestational diabetes 1994   • Hyperlipidemia 2018   • Hypertrophic cardiomyopathy (HCC) 2017    Dx when in [place]   • Type 2 diabetes mellitus (HCC) 2008     Patient's last menstrual period was 09/20/2022.    Medications:     Current Outpatient Medications:   •  atorvastatin (LIPITOR) 40 MG tablet, Take 1 tablet by mouth every night at bedtime., Disp: 90 tablet, Rfl: 1  •  B-D UF III MINI PEN NEEDLES 31G X 5 MM misc, USE 4 TIMES A DAY, Disp: 400 each, Rfl: 3  •  buPROPion XL (WELLBUTRIN XL) 150 MG 24 hr tablet, Take 150 mg by mouth Daily., Disp: , Rfl:   •  Cariprazine HCl 4.5 MG capsule, Take  by mouth Daily., Disp: , Rfl:   •  clonazePAM (KlonoPIN) 1 MG tablet, , Disp: , Rfl:   •  Dulaglutide (Trulicity) 4.5 MG/0.5ML solution pen-injector, Inject 0.5 mL under the skin into the appropriate area as directed Every 7 (Seven) Days., Disp: 6 mL, Rfl: 1  •  enalapril (VASOTEC) 5 MG tablet, Take 1 tablet by mouth Daily., Disp: 90 tablet, Rfl: 3  •  famciclovir (FAMVIR) 500 MG tablet, Take 1 tablet by mouth 3 (Three) Times a Day., Disp: 15 tablet, Rfl: 3  •  glucose blood (OneTouch Verio) test strip, Use to test blood sugar 4 times daily.  Dx E11.65, Disp: 400 each, Rfl: 3  •  Insulin Aspart, w/Niacinamide, (Fiasp FlexTouch) 100 UNIT/ML solution pen-injector, 20-24 units with  meals plus 2:50>150, MDD 80 units, Disp: 75 mL, Rfl: 1  •  Insulin Glargine (BASAGLAR KWIKPEN) 100 UNIT/ML injection pen, Inject 60 Units under the skin into the appropriate area as directed Every Night., Disp: 60 mL, Rfl: 1  •  Insulin Lispro, 1 Unit Dial, (HumaLOG KwikPen) 100 UNIT/ML solution pen-injector, INJECT SUBCUTANEOUSLY 12  UNITS 15 MINUTES BEFORE  MEALS PLUS CORRECTION 1:50  FOR BLOOD GLUCOSE >150. MAX DAILY DOSE: 50 UNITS, Disp: 45 mL, Rfl: 0  •  lithium carbonate 300 MG capsule, Take 300 mg by mouth 2 (Two) Times a Day With Meals. 2 in am, 3 at night, Disp: , Rfl:   •  OneTouch Delica Lancets 33G misc, Use to test blood sugar 4 times daily.  Dx E11.65, Disp: 400 each, Rfl: 3  •  topiramate (TOPAMAX) 100 MG tablet, Take 100 mg by mouth 2 (Two) Times a Day., Disp: , Rfl:   •  clotrimazole (LOTRIMIN) 1 % cream, Apply 1 application topically to the appropriate area as directed 2 (Two) Times a Day for 10 days., Disp: 30 g, Rfl: 0  •  colestipol (COLESTID) 1 g tablet, Take 1 tablet by mouth 2 (Two) Times a Day As Needed (diarrhea). Take 1/2-1 tablet PO TWICE A DAY prn diarrhea, Disp: 60 tablet, Rfl: 3    Allergies:   No Known Allergies    PHQ-2/PHQ-9 Depression Screening 11/5/2022   Retired Total Score -   Little Interest or Pleasure in Doing Things 0-->not at all   Feeling Down, Depressed or Hopeless 0-->not at all   PHQ-9: Brief Depression Severity Measure Score 0     ROSALINDA-7  Feeling nervous, anxious or on edge: Not at all  Not being able to stop or control worrying: Not at all  Worrying too much about different things: Not at all  Trouble Relaxing: Not at all  Being so restless that it is hard to sit still: Not at all  Feeling afraid as if something awful might happen: Not at all  Becoming easily annoyed or irritable: Not at all  ROSALINDA 7 Total Score: 0  If you checked any problems, how difficult have these problems made it for you to do your work, take care of things at home, or get along with other people:  "Not difficult at all        Objective     Physical Exam:  Vital Signs:   Vitals:    11/05/22 0940   BP: 130/86   Pulse: 82   Resp: 21   Temp: 98.2 °F (36.8 °C)   TempSrc: Temporal   SpO2: 99%   Weight: 108 kg (238 lb)   Height: 160 cm (62.99\")     Body mass index is 42.17 kg/m².     Physical Exam  Vitals and nursing note reviewed. Exam conducted with a chaperone present.   Constitutional:       General: She is not in acute distress.     Appearance: Normal appearance. She is well-developed and well-groomed. She is not ill-appearing, toxic-appearing or diaphoretic.   HENT:      Head: Normocephalic and atraumatic.      Right Ear: Tympanic membrane, ear canal and external ear normal.      Left Ear: Tympanic membrane, ear canal and external ear normal.      Nose: Nose normal.      Mouth/Throat:      Lips: Pink.      Mouth: Mucous membranes are moist.      Pharynx: Oropharynx is clear. Uvula midline. No posterior oropharyngeal erythema.   Neck:      Thyroid: No thyroid mass, thyromegaly or thyroid tenderness.   Cardiovascular:      Rate and Rhythm: Normal rate and regular rhythm.      Pulses: Normal pulses.      Heart sounds: Normal heart sounds, S1 normal and S2 normal. No murmur heard.  Pulmonary:      Effort: Pulmonary effort is normal. No respiratory distress.      Breath sounds: Normal breath sounds.   Abdominal:      General: Bowel sounds are normal. There is no distension.      Palpations: Abdomen is soft.      Tenderness: There is no abdominal tenderness.   Musculoskeletal:         General: Normal range of motion.      Cervical back: Normal range of motion and neck supple.      Right lower leg: No edema.      Left lower leg: No edema.   Lymphadenopathy:      Cervical: No cervical adenopathy.   Skin:     General: Skin is warm and dry.      Capillary Refill: Capillary refill takes less than 2 seconds.      Findings: Lesion and rash present.          Neurological:      General: No focal deficit present.      Mental " Status: She is alert and oriented to person, place, and time.   Psychiatric:         Attention and Perception: Attention and perception normal.         Mood and Affect: Mood and affect normal.         Speech: Speech normal.         Behavior: Behavior normal. Behavior is cooperative.         Thought Content: Thought content normal.         Cognition and Memory: Cognition and memory normal.         Judgment: Judgment normal.         Assessment / Plan      Assessment/Plan:   Diagnoses and all orders for this visit:    1. Annual physical exam (Primary)  -     Comprehensive Metabolic Panel; Future  -     CBC (No Diff); Future  -     Lipid Panel; Future  -     Urinalysis With Culture If Indicated - Urine, Clean Catch; Future  -     TSH; Future    2. Essential hypertension  Assessment & Plan:  Hypertension is stable and controlled.  Continue current treatment regimen.  Dietary sodium restriction.  Weight loss.  Regular aerobic exercise.  Continue current medications.  Ambulatory blood pressure monitoring.  Blood pressure will be reassessed at the next regular appointment.    Orders:  -     enalapril (VASOTEC) 5 MG tablet; Take 1 tablet by mouth Daily.  Dispense: 90 tablet; Refill: 3    3. Atypical pigmented skin lesion  Assessment & Plan:  Suspect possible tinea corporis. Will do trial of topical antifungal. However, due to length of time skin lesion has been present as well as increase in dimension of lesion  will refer to dermatology.      Orders:  -     Ambulatory Referral to Dermatology  -     clotrimazole (LOTRIMIN) 1 % cream; Apply 1 application topically to the appropriate area as directed 2 (Two) Times a Day for 10 days.  Dispense: 30 g; Refill: 0    4. Chronic diarrhea  Assessment & Plan:  Recommend trial of colestipol in place of Imodium.  Recommend GI referral should patient's symptoms fail to improve with medication therapy.    Orders:  -     colestipol (COLESTID) 1 g tablet; Take 1 tablet by mouth 2 (Two)  "Times a Day As Needed (diarrhea). Take 1/2-1 tablet PO TWICE A DAY prn diarrhea  Dispense: 60 tablet; Refill: 3    5. Rash and nonspecific skin eruption  -     Ambulatory Referral to Dermatology    6. Acquired skin tag  -     Skin Tag Removal    7. Encounter for screening mammogram for malignant neoplasm of breast  -     Mammo Screening Digital Tomosynthesis Bilateral With CAD; Future    8. Need for influenza vaccination  -     FluLaval/Fluzone >6 mos (4509-4900)    9. Medication refill  -     enalapril (VASOTEC) 5 MG tablet; Take 1 tablet by mouth Daily.  Dispense: 90 tablet; Refill: 3       Skin Tag Removal    Date/Time: 11/5/2022 10:17 AM  Performed by: Marianela Shoemaker APRN  Authorized by: Marianela Shoemaker APRN   Consent: Verbal consent obtained. Written consent obtained.  Risks and benefits: risks, benefits and alternatives were discussed  Consent given by: patient  Patient understanding: patient states understanding of the procedure being performed  Patient consent: the patient's understanding of the procedure matches consent given  Procedure consent: procedure consent matches procedure scheduled  Patient identity confirmed: verbally with patient  Time out: Immediately prior to procedure a \"time out\" was called to verify the correct patient, procedure, equipment, support staff and site/side marked as required.  Local anesthesia used: ethyl chloride spray.    Anesthesia:  Local anesthesia used: ethyl chloride spray.    Sedation:  Patient sedated: no    Comments: Skin tag removed using iris scissors without difficulty. No complications. Trace amount of bleeding. Patient tolerated procedure well. Antibiotic ointment and clean dressing applied. Skin tag removal after-care discussed with patient.        The patient is here for a health maintenance visit.  Currently, the patient consumes a regular diet and has no routine exercise regimen. Screening lab work is ordered.  Immunization administered today and vaccine " education is provided.  Advice and education is given regarding nutrition, aerobic exercise, routine dental evaluations, routine eye exams, reproductive health, cardiovascular risk reduction, sunscreen use, self skin examination (annual dermatology evaluations) and seat belt use (general overall safety).  Further recommendations after lab evaluation.  Annual wellness evaluations recommended.    Follow Up:   PRN and at next scheduled appointment(s) with PCP.    Discussed the nature of the medical condition(s) risks, complications, implications, management, safe and proper use of medications. Encouraged medication compliance, and keeping scheduled follow up appointments with me and any other providers.      RTC if symptoms fail to improve, to ER if symptoms worsen.        *Dictated Utilizing Dragon Dictation   Please note that portions of this note were completed with a voice recognition program.   Part of this note may be an electronic transcription/translation of spoken language to printed text using the Dragon Dictation System.          CHRISTA Garcia  [facility]

## 2022-11-05 NOTE — ASSESSMENT & PLAN NOTE
Suspect possible tinea corporis. Will do trial of topical antifungal. However, due to length of time skin lesion has been present as well as increase in dimension of lesion  will refer to dermatology.

## 2022-11-05 NOTE — ASSESSMENT & PLAN NOTE
Recommend trial of colestipol in place of Imodium.  Recommend GI referral should patient's symptoms fail to improve with medication therapy.

## 2022-11-17 ENCOUNTER — OFFICE VISIT (OUTPATIENT)
Dept: ENDOCRINOLOGY | Facility: CLINIC | Age: 52
End: 2022-11-17

## 2022-11-17 ENCOUNTER — LAB (OUTPATIENT)
Dept: LAB | Facility: HOSPITAL | Age: 52
End: 2022-11-17

## 2022-11-17 VITALS
HEART RATE: 97 BPM | WEIGHT: 237 LBS | OXYGEN SATURATION: 97 % | HEIGHT: 63 IN | BODY MASS INDEX: 41.99 KG/M2 | DIASTOLIC BLOOD PRESSURE: 76 MMHG | SYSTOLIC BLOOD PRESSURE: 128 MMHG

## 2022-11-17 DIAGNOSIS — Z79.4 TYPE 2 DIABETES MELLITUS WITH HYPERGLYCEMIA, WITH LONG-TERM CURRENT USE OF INSULIN: Primary | ICD-10-CM

## 2022-11-17 DIAGNOSIS — E11.65 TYPE 2 DIABETES MELLITUS WITH HYPERGLYCEMIA, WITH LONG-TERM CURRENT USE OF INSULIN: Primary | ICD-10-CM

## 2022-11-17 DIAGNOSIS — E78.2 MIXED HYPERLIPIDEMIA: ICD-10-CM

## 2022-11-17 DIAGNOSIS — R74.01 ELEVATED AST (SGOT): ICD-10-CM

## 2022-11-17 LAB
ALBUMIN UR-MCNC: <1.2 MG/DL
CREAT UR-MCNC: 31.6 MG/DL
EXPIRATION DATE: NORMAL
EXPIRATION DATE: NORMAL
GLUCOSE BLDC GLUCOMTR-MCNC: 77 MG/DL (ref 70–130)
HBA1C MFR BLD: 8 %
Lab: NORMAL
Lab: NORMAL
MICROALBUMIN/CREAT UR: NORMAL MG/G{CREAT}

## 2022-11-17 PROCEDURE — 99214 OFFICE O/P EST MOD 30 MIN: CPT | Performed by: INTERNAL MEDICINE

## 2022-11-17 PROCEDURE — 82570 ASSAY OF URINE CREATININE: CPT | Performed by: INTERNAL MEDICINE

## 2022-11-17 PROCEDURE — 83036 HEMOGLOBIN GLYCOSYLATED A1C: CPT | Performed by: INTERNAL MEDICINE

## 2022-11-17 PROCEDURE — 82043 UR ALBUMIN QUANTITATIVE: CPT | Performed by: INTERNAL MEDICINE

## 2022-11-17 PROCEDURE — 82947 ASSAY GLUCOSE BLOOD QUANT: CPT | Performed by: INTERNAL MEDICINE

## 2022-11-17 RX ORDER — PROCHLORPERAZINE 25 MG/1
1 SUPPOSITORY RECTAL TAKE AS DIRECTED
Qty: 3 EACH | Refills: 11 | Status: SHIPPED | OUTPATIENT
Start: 2022-11-17 | End: 2023-03-29 | Stop reason: SDUPTHER

## 2022-11-17 RX ORDER — PROCHLORPERAZINE 25 MG/1
1 SUPPOSITORY RECTAL TAKE AS DIRECTED
Qty: 1 EACH | Refills: 3 | Status: SHIPPED | OUTPATIENT
Start: 2022-11-17

## 2022-11-17 RX ORDER — PROCHLORPERAZINE 25 MG/1
1 SUPPOSITORY RECTAL ONCE
Qty: 1 EACH | Refills: 0 | Status: SHIPPED | OUTPATIENT
Start: 2022-11-17 | End: 2022-11-17

## 2022-11-17 NOTE — PATIENT INSTRUCTIONS
Take fiasp 22-24 units before meals plus extra if your blood sugar is high.   If BG is 150-199: extra 2 units  -249: extra 4 units  -299: extra 6 units  -349: extra 8 units  +: extra 10 units.

## 2022-11-17 NOTE — PROGRESS NOTES
"Chief Complaint   Patient presents with   • Diabetes          HPI   Patricia Brown is a 52 y.o. female had concerns including Diabetes.    She is checking blood sugars 2+ times per day. AM BGs are typically not < 100. Has been eating more Halloween candy recently.   Current medications for diabetes include trulicity 4.5 mg weekly, basaglar 60 units daily, humalog 22-24 units with meals.   Takes all doses of insulin.  She has missed two doses of trulicity in recent months due to not picking up from the pharmacy.    TG levels high recently.     AST high. Doesn't drink alcohol.     The following portions of the patient's history were reviewed and updated as appropriate: allergies, current medications, past family history, past medical history, past social history, past surgical history and problem list.      Review of Systems   Constitutional: Positive for unexpected weight gain.   Endocrine:        See HPI        Physical Exam  Vitals reviewed.   Constitutional:       Appearance: Normal appearance. She is obese.      Comments: Body mass index is 41.98 kg/m².   Cardiovascular:      Rate and Rhythm: Normal rate.   Pulmonary:      Effort: Pulmonary effort is normal.   Neurological:      General: No focal deficit present.      Mental Status: She is alert. Mental status is at baseline.   Psychiatric:         Mood and Affect: Mood normal.         Behavior: Behavior normal.        /76   Pulse 97   Ht 160 cm (63\")   Wt 108 kg (237 lb)   SpO2 97%   BMI 41.98 kg/m²      Labs and imaging    CMP:  Lab Results   Component Value Date    BUN 12 11/05/2022    CREATININE 0.93 11/05/2022    EGFRIFNONA 81 05/24/2021    BCR 12.9 11/05/2022     11/05/2022    K 4.3 11/05/2022    CO2 21.8 (L) 11/05/2022    CALCIUM 9.1 11/05/2022    ALBUMIN 4.60 11/05/2022    BILITOT 0.2 11/05/2022    ALKPHOS 95 11/05/2022    AST 47 (H) 11/05/2022    ALT 17 11/05/2022     Lipid Panel:  Lab Results   Component Value Date    CHOL 155 " 11/05/2022    TRIG 205 (H) 11/05/2022    HDL 62 (H) 11/05/2022    VLDL 33 11/05/2022    LDL 60 11/05/2022     HbA1c:  Lab Results   Component Value Date    HGBA1C 8.0 11/17/2022    HGBA1C 7.7 08/15/2022     Glucose:    Lab Results   Component Value Date    POCGLU 77 11/17/2022     Microalbumin:  Lab Results   Component Value Date    MALBCRERATIO 14.9 09/13/2021     TSH:  Lab Results   Component Value Date    TSH 1.410 11/05/2022       Assessment and plan  Diagnoses and all orders for this visit:    1. Type 2 diabetes mellitus with hyperglycemia, with long-term current use of insulin (HCC) (Primary)  Uncontrolled with A1c 8.0.  With hyperglycemia.  No other complications to diabetes.  Continue Trulicity 4.5 mg weekly.    Continue Basaglar 60 units once daily.  Continue Fiasp 20 to 24 units with meals +2: 50 greater than 150.  Decrease snacking, decrease sweets, more frequent BG monitoring.  Prescription for CGM was sent to the pharmacy.  Labs are up-to-date from November.  Urine microalbumin due and will be checked today.  Ophtho exam should be updated yearly.  Monofilament up-to-date from August.  -     POC Glucose, Blood  -     POC Glycosylated Hemoglobin (Hb A1C)  -     Continuous Blood Gluc  (Dexcom G6 ) device; 1 each 1 (One) Time for 1 dose.  Dispense: 1 each; Refill: 0  -     Continuous Blood Gluc Sensor (Dexcom G6 Sensor); 1 each Take As Directed.  Dispense: 3 each; Refill: 11  -     Continuous Blood Gluc Transmit (Dexcom G6 Transmitter) misc; 1 each Take As Directed.  Dispense: 1 each; Refill: 3  -     Microalbumin / Creatinine Urine Ratio - Urine, Clean Catch    2. Mixed hyperlipidemia  With hypertriglyceridemia in part due to uncontrolled diabetes.  Decrease foods high in fat.  Continue atorvastatin 40 mg daily.  Monitor lipids yearly.    3. Elevated AST (SGOT)  Mildly elevated AST.  Suspect fatty liver.  Patient does not drink alcohol.  Weight loss, dietary modifications advised. Can  monitor.       Return in about 4 months (around 3/17/2023) for next scheduled follow up. The patient was instructed to contact the clinic with any interval questions or concerns.    Callie Ramirez, DO   Endocrinologist    Please note that portions of this note were completed with a voice recognition program.

## 2022-11-18 ENCOUNTER — PRIOR AUTHORIZATION (OUTPATIENT)
Dept: ENDOCRINOLOGY | Facility: CLINIC | Age: 52
End: 2022-11-18

## 2022-11-18 NOTE — TELEPHONE ENCOUNTER
BRIAN HERNANDEZ Key: [id number] - PA Case ID: [id number] - Rx #: [id number] help? Call us at [phone]  Outcome  Approvedon November 17  Your PA request has been approved. Additional information will be provided in the approval communication. (Message 1145)  Drug  Dexcom G6 Transmitter  Form  [employer] Electronic PA Form (2017 NCPDP)

## 2022-12-15 ENCOUNTER — TELEPHONE (OUTPATIENT)
Dept: ENDOCRINOLOGY | Facility: CLINIC | Age: 52
End: 2022-12-15

## 2022-12-15 DIAGNOSIS — E11.65 TYPE 2 DIABETES MELLITUS WITH HYPERGLYCEMIA, WITH LONG-TERM CURRENT USE OF INSULIN: Primary | ICD-10-CM

## 2022-12-15 DIAGNOSIS — Z79.4 TYPE 2 DIABETES MELLITUS WITH HYPERGLYCEMIA, WITH LONG-TERM CURRENT USE OF INSULIN: Primary | ICD-10-CM

## 2022-12-15 RX ORDER — LIRAGLUTIDE 6 MG/ML
1.8 INJECTION SUBCUTANEOUS DAILY
Qty: 9 ML | Refills: 1 | Status: SHIPPED | OUTPATIENT
Start: 2022-12-15 | End: 2023-01-16 | Stop reason: SDUPTHER

## 2022-12-15 NOTE — TELEPHONE ENCOUNTER
Pt called states Trulicity 4.5 mg is out of stock pt wants to know her next step. Pt did state she has tried soliqua and ozempic. Pt last f/u 11/17/22 pt next f/u 03/29/23. Please notify pt

## 2022-12-19 ENCOUNTER — TELEPHONE (OUTPATIENT)
Dept: ENDOCRINOLOGY | Facility: CLINIC | Age: 52
End: 2022-12-19

## 2022-12-19 NOTE — TELEPHONE ENCOUNTER
Pt called and needs prior authorization for victoza. She was on trulicity but it's on back order. Pt has not had this medication for a month and her blood sugar is running in the 300s. Please assist.       Call pt:  [phone]      [facility]:  Phone: [phone] Fax: [phone]

## 2022-12-19 NOTE — TELEPHONE ENCOUNTER
PA submitted via Counts include 234 beds at the [facility].  Left message for patient to return call.

## 2022-12-19 NOTE — TELEPHONE ENCOUNTER
Spoke with patient.  Still waiting on PA of Victoza.  Glucometer is reading High right now.  She states it is over 250 all of the time.   Complaints of headache and blurry vision and is extremely thirsty.  Denies any vomiting or nausea.  Denies any recent illnesses or changes in medicine besides not being able to get the trulicity or victoza. Currently taking Basaglar 60 units at night.  Short-acting (humalog) she has been using 24 units with meals.  Has not been doing the correction dose.

## 2022-12-19 NOTE — TELEPHONE ENCOUNTER
I spoke to the patient.  She was able to get Trulicity from her pharmacy.  Her glucose on recheck was down to 203 after taking 12 units of fast acting insulin.  I reminded her of the correction insulin scale and her last after visit summary.  Call the office if BG's are trending up over 200s again.

## 2023-01-16 ENCOUNTER — TELEPHONE (OUTPATIENT)
Dept: ENDOCRINOLOGY | Facility: CLINIC | Age: 53
End: 2023-01-16
Payer: COMMERCIAL

## 2023-01-16 DIAGNOSIS — Z79.4 TYPE 2 DIABETES MELLITUS WITH HYPERGLYCEMIA, WITH LONG-TERM CURRENT USE OF INSULIN: ICD-10-CM

## 2023-01-16 DIAGNOSIS — E11.65 TYPE 2 DIABETES MELLITUS WITH HYPERGLYCEMIA, WITH LONG-TERM CURRENT USE OF INSULIN: ICD-10-CM

## 2023-01-16 RX ORDER — LIRAGLUTIDE 6 MG/ML
1.8 INJECTION SUBCUTANEOUS DAILY
Qty: 9 ML | Refills: 1 | Status: SHIPPED | OUTPATIENT
Start: 2023-01-16 | End: 2023-03-27

## 2023-01-16 NOTE — TELEPHONE ENCOUNTER
PATIENT CALLED BACK STATING THAT SHE IS WANTING AN UPDATE ON WHAT TO DO WITH HER MEDICATION.  STATED THAT SHE IS OUT OF TRULICITY AND THAT SHE CAN NOT GET A REFILL DUE TO THE SHORTAGE.  SHE IS WANTING A CALL BACK BY END OF DAY DUE TO HAVING TO WORK TOMORROW AND WONT BE ABLE TO  THE PRESCRIPTION TILL TOMORROW EVENING.  PATIENT STATED SHE CAN NOT GO TWO DAYS WITH OUT MEDICATION, DUE TO HER BLOOD SUGAR RISING.

## 2023-01-16 NOTE — TELEPHONE ENCOUNTER
Spoke with patient.  She states her pharmacy still cannot get the Trulicity.  Advised there was a prescription sent for Victoza last month.  She is going to contact the pharmacy and have them fill.

## 2023-01-16 NOTE — TELEPHONE ENCOUNTER
PT CALLED STATING TRULICITY IS ON BACK ORDER, PT REQUESTED VICTOZA TO BE SENT IN. SHE STATED HER INSURANCE WILL NOT COVER VICTOZA.     PT IS OUT OF MEDS.

## 2023-01-16 NOTE — TELEPHONE ENCOUNTER
Spoke with patient.  She states that the pharmacy deleted the Victoza prescription.  Asking for rx to be resent.  Rx pended.

## 2023-01-18 ENCOUNTER — TELEPHONE (OUTPATIENT)
Dept: ENDOCRINOLOGY | Facility: CLINIC | Age: 53
End: 2023-01-18
Payer: COMMERCIAL

## 2023-01-18 NOTE — TELEPHONE ENCOUNTER
Patient called stated her pharmacy cannot get trulicity or victoza and she is without insulin. Please advise.

## 2023-01-18 NOTE — TELEPHONE ENCOUNTER
Patient notified.  She wants to know if she needs to increase her insulin?  Pharmacy states Victoza should be in tomorrow but she states they have said that for the last 2 days.  Blood sugars are running upper 200's to 300.  On the phone it was 271 and she had not eaten since 12pm.  She is currently still using the Humalog 26 units with meals (will finish it and switch over to Fiasp in a couple of days) and Basaglar 60 units

## 2023-01-19 ENCOUNTER — HOSPITAL ENCOUNTER (OUTPATIENT)
Dept: MAMMOGRAPHY | Facility: HOSPITAL | Age: 53
Discharge: HOME | End: 2023-01-19
Admitting: NURSE PRACTITIONER
Payer: COMMERCIAL

## 2023-01-19 DIAGNOSIS — Z12.31 ENCOUNTER FOR SCREENING MAMMOGRAM FOR MALIGNANT NEOPLASM OF BREAST: ICD-10-CM

## 2023-01-19 PROCEDURE — 77063 BREAST TOMOSYNTHESIS BI: CPT

## 2023-01-19 PROCEDURE — 77067 SCR MAMMO BI INCL CAD: CPT | Performed by: RADIOLOGY

## 2023-01-19 PROCEDURE — 77067 SCR MAMMO BI INCL CAD: CPT

## 2023-01-19 PROCEDURE — 77063 BREAST TOMOSYNTHESIS BI: CPT | Performed by: RADIOLOGY

## 2023-02-01 ENCOUNTER — TELEPHONE (OUTPATIENT)
Dept: FAMILY MEDICINE CLINIC | Facility: CLINIC | Age: 53
End: 2023-02-01

## 2023-02-01 NOTE — TELEPHONE ENCOUNTER
Caller: Patricia Brown    Relationship: Self    Best call back number: [phone]    Requested Prescriptions:   MIGRAINE MEDICATION    Pharmacy where request should be sent: [id number] - [address] - [phone]  - [phone] FX    Additional details provided by patient: HAS HAD A HISTORY OF NON SERIOUS MIGRAINES BUT THE LAST TWO DAYS SHE'S HAD PERSISTENT MIGRAINES ALL DAY    Does the patient have less than a 3 day supply:  [x] Yes  [] No    Would you like a call back once the refill request has been completed: [x] Yes [] No    If the office needs to give you a call back, can they leave a voicemail: [] Yes [x] No    Perry Walden, PCT   02/01/23 11:19 EST

## 2023-02-10 ENCOUNTER — OFFICE VISIT (OUTPATIENT)
Dept: OBSTETRICS AND GYNECOLOGY | Facility: CLINIC | Age: 53
End: 2023-02-10
Payer: COMMERCIAL

## 2023-02-10 VITALS — BODY MASS INDEX: 42.34 KG/M2 | RESPIRATION RATE: 14 BRPM | WEIGHT: 239 LBS

## 2023-02-10 DIAGNOSIS — R87.612 LGSIL ON PAP SMEAR OF CERVIX: ICD-10-CM

## 2023-02-10 DIAGNOSIS — N89.3 VAGINAL DYSPLASIA: ICD-10-CM

## 2023-02-10 DIAGNOSIS — R87.613 HIGH GRADE INTREPITH LESION CYTO SMR CRVX (HGSIL): Primary | ICD-10-CM

## 2023-02-10 PROCEDURE — 99213 OFFICE O/P EST LOW 20 MIN: CPT | Performed by: OBSTETRICS & GYNECOLOGY

## 2023-02-10 RX ORDER — DULAGLUTIDE 4.5 MG/.5ML
INJECTION, SOLUTION SUBCUTANEOUS
COMMUNITY
Start: 2023-01-23 | End: 2023-03-27 | Stop reason: ALTCHOICE

## 2023-02-10 RX ORDER — LITHIUM CARBONATE 300 MG/1
1 TABLET, FILM COATED, EXTENDED RELEASE ORAL EVERY 12 HOURS SCHEDULED
COMMUNITY
Start: 2023-01-07 | End: 2023-02-10 | Stop reason: SDUPTHER

## 2023-02-10 RX ORDER — PROCHLORPERAZINE 25 MG/1
SUPPOSITORY RECTAL
COMMUNITY
Start: 2022-11-17 | End: 2023-03-29 | Stop reason: SDUPTHER

## 2023-02-15 LAB — REF LAB TEST METHOD: NORMAL

## 2023-03-26 DIAGNOSIS — E78.2 MIXED HYPERLIPIDEMIA: ICD-10-CM

## 2023-03-27 DIAGNOSIS — E11.65 TYPE 2 DIABETES MELLITUS WITH HYPERGLYCEMIA, WITH LONG-TERM CURRENT USE OF INSULIN: ICD-10-CM

## 2023-03-27 DIAGNOSIS — Z79.4 TYPE 2 DIABETES MELLITUS WITH HYPERGLYCEMIA, WITH LONG-TERM CURRENT USE OF INSULIN: ICD-10-CM

## 2023-03-27 RX ORDER — LIRAGLUTIDE 6 MG/ML
INJECTION SUBCUTANEOUS
Qty: 9 ML | Refills: 0 | Status: SHIPPED | OUTPATIENT
Start: 2023-03-27 | End: 2023-03-29

## 2023-03-27 RX ORDER — ATORVASTATIN CALCIUM 40 MG/1
TABLET, FILM COATED ORAL
Qty: 90 TABLET | Refills: 1 | Status: SHIPPED | OUTPATIENT
Start: 2023-03-27

## 2023-03-29 ENCOUNTER — OFFICE VISIT (OUTPATIENT)
Dept: ENDOCRINOLOGY | Facility: CLINIC | Age: 53
End: 2023-03-29
Payer: COMMERCIAL

## 2023-03-29 VITALS
HEART RATE: 84 BPM | SYSTOLIC BLOOD PRESSURE: 122 MMHG | DIASTOLIC BLOOD PRESSURE: 82 MMHG | BODY MASS INDEX: 42.35 KG/M2 | HEIGHT: 63 IN | OXYGEN SATURATION: 98 % | WEIGHT: 239 LBS

## 2023-03-29 DIAGNOSIS — E66.01 CLASS 3 SEVERE OBESITY DUE TO EXCESS CALORIES WITH SERIOUS COMORBIDITY AND BODY MASS INDEX (BMI) OF 40.0 TO 44.9 IN ADULT: ICD-10-CM

## 2023-03-29 DIAGNOSIS — E11.65 TYPE 2 DIABETES MELLITUS WITH HYPERGLYCEMIA, WITH LONG-TERM CURRENT USE OF INSULIN: Primary | ICD-10-CM

## 2023-03-29 DIAGNOSIS — R29.818 SUSPECTED SLEEP APNEA: ICD-10-CM

## 2023-03-29 DIAGNOSIS — Z79.4 TYPE 2 DIABETES MELLITUS WITH HYPERGLYCEMIA, WITH LONG-TERM CURRENT USE OF INSULIN: Primary | ICD-10-CM

## 2023-03-29 DIAGNOSIS — R74.01 ELEVATED AST (SGOT): ICD-10-CM

## 2023-03-29 DIAGNOSIS — E78.2 MIXED HYPERLIPIDEMIA: ICD-10-CM

## 2023-03-29 LAB
EXPIRATION DATE: ABNORMAL
EXPIRATION DATE: NORMAL
GLUCOSE BLDC GLUCOMTR-MCNC: 223 MG/DL (ref 70–130)
HBA1C MFR BLD: 7.7 %
Lab: ABNORMAL
Lab: NORMAL

## 2023-03-29 PROCEDURE — 99214 OFFICE O/P EST MOD 30 MIN: CPT | Performed by: INTERNAL MEDICINE

## 2023-03-29 PROCEDURE — 95251 CONT GLUC MNTR ANALYSIS I&R: CPT | Performed by: INTERNAL MEDICINE

## 2023-03-29 PROCEDURE — 83036 HEMOGLOBIN GLYCOSYLATED A1C: CPT | Performed by: INTERNAL MEDICINE

## 2023-03-29 RX ORDER — INSULIN GLARGINE 100 [IU]/ML
60 INJECTION, SOLUTION SUBCUTANEOUS NIGHTLY
Qty: 60 ML | Refills: 1 | Status: SHIPPED | OUTPATIENT
Start: 2023-03-29 | End: 2023-03-29 | Stop reason: SDUPTHER

## 2023-03-29 RX ORDER — PROCHLORPERAZINE 25 MG/1
1 SUPPOSITORY RECTAL TAKE AS DIRECTED
Qty: 9 EACH | Refills: 3 | Status: SHIPPED | OUTPATIENT
Start: 2023-03-29

## 2023-03-29 RX ORDER — DULAGLUTIDE 4.5 MG/.5ML
4.5 INJECTION, SOLUTION SUBCUTANEOUS
Qty: 6 ML | Refills: 1 | Status: SHIPPED | OUTPATIENT
Start: 2023-03-29

## 2023-03-29 RX ORDER — INSULIN GLARGINE 100 [IU]/ML
54 INJECTION, SOLUTION SUBCUTANEOUS NIGHTLY
Qty: 60 ML | Refills: 1 | Status: SHIPPED | OUTPATIENT
Start: 2023-03-29

## 2023-03-29 RX ORDER — PROCHLORPERAZINE 25 MG/1
1 SUPPOSITORY RECTAL DAILY
Qty: 1 EACH | Refills: 0 | Status: SHIPPED | OUTPATIENT
Start: 2023-03-29

## 2023-03-29 NOTE — PROGRESS NOTES
"Chief Complaint   Patient presents with   • Diabetes          HPI   Patricia Brown is a 53 y.o. female had concerns including Diabetes.    She is checking blood sugars 4+ times per day with CGM. Data reviewed from the last two weeks shows average glucose 234 with SD of 51. In target range 15%, high 49%, very high 36%, low 0%.   Pattern of: postprandial and all day hyperglycemia.    Current medications for diabetes include basaglar 50 units, fiasp 24-26 untis with meals, was on trulicity/victoza but issues with supply at pharmacy.  Was taking higher doses of basaglar but had low BGs overnight.   Hasn't missed trulicity other than one dose.   Hasn't been on SGLT-2 inhibitor before - was concerned about frequent urination.    Ophtho exam is pending.     Notes fatigue. Thinks she may have sleep apnea. Hasn't had recent eval.     The following portions of the patient's history were reviewed and updated as appropriate: allergies, current medications, past family history, past medical history, past social history, past surgical history and problem list.      Review of Systems   Constitutional: Positive for fatigue.   Respiratory: Positive for shortness of breath (chronic).    Endocrine:        See HPI        Physical Exam  Vitals reviewed.   Constitutional:       Appearance: Normal appearance. She is obese.      Comments: Body mass index is 42.34 kg/m².   Cardiovascular:      Rate and Rhythm: Normal rate.   Pulmonary:      Effort: Pulmonary effort is normal.   Neurological:      General: No focal deficit present.      Mental Status: She is alert. Mental status is at baseline.   Psychiatric:         Mood and Affect: Mood normal.         Behavior: Behavior normal.        /82   Pulse 84   Ht 160 cm (63\")   Wt 108 kg (239 lb)   SpO2 98%   BMI 42.34 kg/m²      Labs and imaging    CMP:  Lab Results   Component Value Date    BUN 12 11/05/2022    CREATININE 0.93 11/05/2022    EGFRIFNONA 81 05/24/2021    BCR 12.9 " 11/05/2022     11/05/2022    K 4.3 11/05/2022    CO2 21.8 (L) 11/05/2022    CALCIUM 9.1 11/05/2022    ALBUMIN 4.60 11/05/2022    BILITOT 0.2 11/05/2022    ALKPHOS 95 11/05/2022    AST 47 (H) 11/05/2022    ALT 17 11/05/2022     Lipid Panel:  Lab Results   Component Value Date    CHOL 155 11/05/2022    TRIG 205 (H) 11/05/2022    HDL 62 (H) 11/05/2022    VLDL 33 11/05/2022    LDL 60 11/05/2022     HbA1c:  Lab Results   Component Value Date    HGBA1C 7.7 03/29/2023    HGBA1C 8.0 11/17/2022     Glucose:    Lab Results   Component Value Date    POCGLU 223 (A) 03/29/2023     Microalbumin:  Lab Results   Component Value Date    MALBCRERATIO  11/17/2022      Comment:      Unable to calculate     TSH:  Lab Results   Component Value Date    TSH 1.410 11/05/2022       Assessment and plan  Diagnoses and all orders for this visit:    1. Type 2 diabetes mellitus with hyperglycemia, with long-term current use of insulin (HCC) (Primary)  Uncontrolled with hyperglycemia.  A1c 7.7.  No complications from diabetes.  No metformin due to history of GI intolerance.  Add Jardiance 25 mg daily.  Caution for UTI or yeast infections.  Mild diuretic in fact should resolve within a few days.  Call the office if not tolerated.  Prescription sent.  Continue Trulicity 4.5 mg weekly.  Prescription sent.  Increase Basaglar to 54 units nightly.  Continue  Fiasp 24 units with meals and add correction scale 2: 50 greater than 150.  Instructions and after visit summary.  Continue Dexcom.  Labs are up-to-date from November including normal urine microalbumin.  Ophtho exam is overdue and patient was encouraged to schedule.  Monofilament up-to-date from August.  -     POC Glucose, Blood  -     POC Glycosylated Hemoglobin (Hb A1C)  -     Dulaglutide (Trulicity) 4.5 MG/0.5ML solution pen-injector; Inject 0.5 mL under the skin into the appropriate area as directed Every 7 (Seven) Days.  Dispense: 6 mL; Refill: 1  -     Continuous Blood Gluc Sensor  (Dexcom G6 Sensor); 1 each Take As Directed.  Dispense: 9 each; Refill: 3  -     Discontinue: Insulin Glargine (BASAGLAR KWIKPEN) 100 UNIT/ML injection pen; Inject 60 Units under the skin into the appropriate area as directed Every Night.  Dispense: 60 mL; Refill: 1  -     Continuous Blood Gluc  (Dexcom G6 ) device; 1 each by Other route Daily.  Dispense: 1 each; Refill: 0  -     Insulin Glargine (BASAGLAR KWIKPEN) 100 UNIT/ML injection pen; Inject 54 Units under the skin into the appropriate area as directed Every Night.  Dispense: 60 mL; Refill: 1  -     empagliflozin (Jardiance) 25 MG tablet tablet; Take 1 tablet by mouth Daily.  Dispense: 30 tablet; Refill: 5    2. Mixed hyperlipidemia  With hypertriglyceridemia.  Should improve his glucose levels come down.  LDL at goal on atorvastatin 40 mg daily.  Monitor yearly.    3. Suspected sleep apnea  Untreated LAUREN will contribute to difficulty with weight loss, hypertension, headaches, increased insulin resistance, increased risk of strokes and heart attacks.  Referral placed to Dr. Weiss.  -     Ambulatory Referral to Sleep Medicine    4. Elevated AST (SGOT)  Suspect possible fatty liver.  Monitor.  No history of alcohol use.    5. Class 3 severe obesity due to excess calories with serious comorbidity and body mass index (BMI) of 40.0 to 44.9 in adult (HCC)  BMI 42.3.  Weight loss is necessary for overall health and diabetes management.  Continue Trulicity as above.  Add Jardiance may help.  Sleep apnea evaluation/management as above.       Return in about 4 months (around 7/29/2023) for next scheduled follow up. The patient was instructed to contact the clinic with any interval questions or concerns.    Callie Ramirez, DO   Endocrinologist    Please note that portions of this note were completed with a voice recognition program.

## 2023-03-29 NOTE — PATIENT INSTRUCTIONS
Increase basaglar to 54 units.     Add jardiance 25 mg daily in the morning.     Take fiasp 24 units before meals plus extra if your blood sugar is high.   If BG is 150-199: extra 2 units  -249: extra 4 units  -299: extra 6 units  -349: extra 8 units  +: extra 10 units.

## 2023-03-31 ENCOUNTER — PRIOR AUTHORIZATION (OUTPATIENT)
Dept: ENDOCRINOLOGY | Facility: CLINIC | Age: 53
End: 2023-03-31
Payer: COMMERCIAL

## 2023-03-31 NOTE — TELEPHONE ENCOUNTER
BRIAN HERNANDEZ Key: [id number] - PA Case ID: [id number] - Rx #: [id number] help? Call us at [phone]  Outcome  Approvedon March 29  Your PA request has been approved. Additional information will be provided in the approval communication. (Message 1148)  Drug  Jardiance 25MG tablets  Form  [employer] Electronic PA Form (2017 NCPDP)

## 2023-08-06 NOTE — PROGRESS NOTES
Subjective   Chief Complaint   Patient presents with    Abnormal Pap Smear     Patricia Brown is a 53 y.o. year old  presenting to be seen for a PAP in follow-up of a prior abnormal PAP and/or HPV screen.    The following portions of the patient's history were reviewed and updated as appropriate:no additional history reviewed.    Social History    Tobacco Use      Smoking status: Former        Packs/day: 1.00        Years: 20.00        Pack years: 20        Types: Cigarettes        Start date: 1985        Quit date: 2005        Years since quittin.6      Smokeless tobacco: Never         Objective   Resp 14   Wt 105 kg (232 lb)   LMP 2023 (Approximate)   BMI 41.10 kg/mý     General:  well developed; well nourished  no acute distress   Pelvis: Clinical staff was present for exam  External genitalia:  normal appearance of the external genitalia including Bartholin's and [place]'s glands.  :  urethral meatus normal;  Vaginal:  normal pink mucosa without prolapse or lesions.  Cervix:  normal appearance.     Lab Review   No data reviewed    Imaging   No data reviewed       Assessment   LGSIL S/P LEEP (2022) with clear margins - 1 normal PAP since  Prior h/o VAIN 2-3 S/p laser (2022)     Plan   Pap was done today.  If she does not receive the results of the Pap within 2 weeks  time, she was instructed to call to find out the results.  I explained to Patricia that because she is being seen in follow-up of a previously abnormal Pap smear, her next Pap needs to be performed in 4-6 months.  She will need to be seen roughly every 6 months until 3 consecutive normal Paps have been obtained.  At that point, she can return to routine screening intervals.  The importance of keeping all planned follow-up and taking all medications as prescribed was emphasized.  Follow up for repeat PAP smear 6 months           This note was electronically signed.    Tyson Iyer M.D.  August 10,  2023    Part of this note may be an electronic transcription/translation of spoken language to printed text using the Dragon Dictation System.

## 2023-08-10 ENCOUNTER — OFFICE VISIT (OUTPATIENT)
Dept: OBSTETRICS AND GYNECOLOGY | Facility: CLINIC | Age: 53
End: 2023-08-10
Payer: COMMERCIAL

## 2023-08-10 VITALS — BODY MASS INDEX: 41.1 KG/M2 | RESPIRATION RATE: 14 BRPM | WEIGHT: 232 LBS

## 2023-08-10 DIAGNOSIS — N89.3 VAGINAL DYSPLASIA: ICD-10-CM

## 2023-08-10 DIAGNOSIS — Z01.42 PAP SMEAR TO CONFIRM RECENT NORMAL SMEAR FOLLOWING INITIAL ABNORMAL SMEAR: Primary | ICD-10-CM

## 2023-08-10 DIAGNOSIS — R87.613 HIGH GRADE INTREPITH LESION CYTO SMR CRVX (HGSIL): ICD-10-CM

## 2023-08-10 RX ORDER — SEMAGLUTIDE 0.68 MG/ML
INJECTION, SOLUTION SUBCUTANEOUS
COMMUNITY
Start: 2023-07-13 | End: 2023-08-10 | Stop reason: SDUPTHER

## 2023-08-12 DIAGNOSIS — K52.9 CHRONIC DIARRHEA: ICD-10-CM

## 2023-08-14 LAB — REF LAB TEST METHOD: NORMAL

## 2023-08-14 RX ORDER — MONTELUKAST SODIUM 4 MG/1
TABLET, CHEWABLE ORAL
Qty: 60 TABLET | Refills: 0 | Status: SHIPPED | OUTPATIENT
Start: 2023-08-14

## 2023-08-14 NOTE — TELEPHONE ENCOUNTER
Rx Refill Note  Requested Prescriptions     Pending Prescriptions Disp Refills    colestipol (COLESTID) 1 g tablet [Pharmacy Med Name: COLESTIPOL HCL 1 GM TABLET] 60 tablet 3     Sig: TAKE 1/2 TO 1 TABLET BY MOUTH TWICE A DAY AS NEEDED FOR DIARRHEA      Last office visit with prescribing clinician: 11/5/2022   Last telemedicine visit with prescribing clinician: Visit date not found   Next office visit with prescribing clinician: Visit date not found                         Would you like a call back once the refill request has been completed: [] Yes [] No    If the office needs to give you a call back, can they leave a voicemail: [] Yes [] No    Lennie Todd MA  08/14/23, 09:26 EDT

## 2023-08-18 ENCOUNTER — TELEPHONE (OUTPATIENT)
Dept: ENDOCRINOLOGY | Facility: CLINIC | Age: 53
End: 2023-08-18

## 2023-08-18 NOTE — TELEPHONE ENCOUNTER
PATIENT STARTED TAKING OZEMPIC ON LAST SUNDAY, SHE HAS BEEN EXPERIENCING SWOLLEN TONGUE ISSUES. SHE THINKS THIS IS FROM THE OZEMPIC. SHE WANTS TO SEE IF WE CAN TRY SOMETHING ELSE BESIDES OZEMPIC DUE TO SIDE EFFECT OF SWOLLEN TONGUE. PATIENTS NUMBER [phone]

## 2023-08-21 NOTE — TELEPHONE ENCOUNTER
Discontinue ozempic for now. Dr Zamorano will contact the patient with the alternative medication.

## 2023-08-28 RX ORDER — DULAGLUTIDE 4.5 MG/.5ML
4.5 INJECTION, SOLUTION SUBCUTANEOUS
Qty: 6 ML | Refills: 1 | Status: SHIPPED | OUTPATIENT
Start: 2023-08-28

## 2023-09-09 DIAGNOSIS — Z79.4 TYPE 2 DIABETES MELLITUS WITH HYPERGLYCEMIA, WITH LONG-TERM CURRENT USE OF INSULIN: ICD-10-CM

## 2023-09-09 DIAGNOSIS — E11.65 TYPE 2 DIABETES MELLITUS WITH HYPERGLYCEMIA, WITH LONG-TERM CURRENT USE OF INSULIN: ICD-10-CM

## 2023-09-11 RX ORDER — EMPAGLIFLOZIN 25 MG/1
TABLET, FILM COATED ORAL
Qty: 90 TABLET | Refills: 1 | Status: SHIPPED | OUTPATIENT
Start: 2023-09-11

## 2023-09-11 NOTE — TELEPHONE ENCOUNTER
Rx Refill Note  Requested Prescriptions     Pending Prescriptions Disp Refills    Jardiance 25 MG tablet tablet [Pharmacy Med Name: JARDIANCE 25 MG TABLET] 30 tablet 5     Sig: TAKE 1 TABLET BY MOUTH EVERY DAY      Last office visit with prescribing clinician: 7/11/2023     Next office visit with prescribing clinician: 11/29/2023       Michael Rodriguez MA  09/11/23, 08:27 EDT

## 2023-09-14 DIAGNOSIS — K52.9 CHRONIC DIARRHEA: ICD-10-CM

## 2023-09-14 RX ORDER — MONTELUKAST SODIUM 4 MG/1
TABLET, CHEWABLE ORAL
Qty: 60 TABLET | Refills: 0 | Status: SHIPPED | OUTPATIENT
Start: 2023-09-14

## 2023-09-14 NOTE — TELEPHONE ENCOUNTER
Rx Refill Note  Requested Prescriptions     Pending Prescriptions Disp Refills    colestipol (COLESTID) 1 g tablet [Pharmacy Med Name: COLESTIPOL HCL 1 GM TABLET] 60 tablet 0     Sig: TAKE 1/2-1 TABLET BY MOUTH TWICE DAILY AS NEEDED FOR DIARRHEA      Last office visit with prescribing clinician: Visit date not found   Last telemedicine visit with prescribing clinician: Visit date not found   Next office visit with prescribing clinician: Visit date not found                         Would you like a call back once the refill request has been completed: [] Yes [] No    If the office needs to give you a call back, can they leave a voicemail: [] Yes [] No    Vicky Santoro MA  09/14/23, 09:05 EDT

## 2023-09-25 DIAGNOSIS — E78.2 MIXED HYPERLIPIDEMIA: ICD-10-CM

## 2023-09-25 RX ORDER — ATORVASTATIN CALCIUM 40 MG/1
TABLET, FILM COATED ORAL
Qty: 90 TABLET | Refills: 1 | Status: SHIPPED | OUTPATIENT
Start: 2023-09-25

## 2023-09-25 NOTE — TELEPHONE ENCOUNTER
Rx Refill Note  Requested Prescriptions     Pending Prescriptions Disp Refills    atorvastatin (LIPITOR) 40 MG tablet [Pharmacy Med Name: ATORVASTATIN 40 MG TABLET] 90 tablet 1     Sig: TAKE 1 TABLET BY MOUTH EVERYDAY AT BEDTIME      Last office visit with prescribing clinician: 7/11/2023     Next office visit with prescribing clinician: 11/29/2023       Michael Rodriguez MA  09/25/23, 08:28 EDT

## 2023-10-13 DIAGNOSIS — K52.9 CHRONIC DIARRHEA: ICD-10-CM

## 2023-10-14 RX ORDER — MONTELUKAST SODIUM 4 MG/1
TABLET, CHEWABLE ORAL
Qty: 60 TABLET | Refills: 0 | Status: SHIPPED | OUTPATIENT
Start: 2023-10-14

## 2023-10-17 DIAGNOSIS — K52.9 CHRONIC DIARRHEA: ICD-10-CM

## 2023-10-17 RX ORDER — MONTELUKAST SODIUM 4 MG/1
TABLET, CHEWABLE ORAL
Qty: 60 TABLET | Refills: 0 | Status: SHIPPED | OUTPATIENT
Start: 2023-10-17

## 2023-11-20 ENCOUNTER — PRIOR AUTHORIZATION (OUTPATIENT)
Dept: ENDOCRINOLOGY | Facility: CLINIC | Age: 53
End: 2023-11-20
Payer: COMMERCIAL

## 2023-11-20 NOTE — TELEPHONE ENCOUNTER
BRIAN HERNANDEZ Key: [id number] - PA Case ID: [id number] help? Call us at [phone]  Outcome  Approvedtoday  Your PA request has been approved. Additional information will be provided in the approval communication. (Message 1145)  Drug  Dexcom G6 Transmitter  Form  [employer] Electronic PA Form (2017 NCPDP)

## 2023-11-29 ENCOUNTER — OFFICE VISIT (OUTPATIENT)
Dept: ENDOCRINOLOGY | Facility: CLINIC | Age: 53
End: 2023-11-29
Payer: COMMERCIAL

## 2023-11-29 VITALS
HEART RATE: 79 BPM | BODY MASS INDEX: 42.17 KG/M2 | HEIGHT: 63 IN | WEIGHT: 238 LBS | SYSTOLIC BLOOD PRESSURE: 120 MMHG | DIASTOLIC BLOOD PRESSURE: 82 MMHG | OXYGEN SATURATION: 97 %

## 2023-11-29 DIAGNOSIS — E11.65 TYPE 2 DIABETES MELLITUS WITH HYPERGLYCEMIA, WITH LONG-TERM CURRENT USE OF INSULIN: Primary | ICD-10-CM

## 2023-11-29 DIAGNOSIS — E78.2 MIXED HYPERLIPIDEMIA: ICD-10-CM

## 2023-11-29 DIAGNOSIS — Z79.4 TYPE 2 DIABETES MELLITUS WITH HYPERGLYCEMIA, WITH LONG-TERM CURRENT USE OF INSULIN: Primary | ICD-10-CM

## 2023-11-29 LAB
ALBUMIN SERPL-MCNC: 4 G/DL (ref 3.5–5.2)
ALBUMIN UR-MCNC: <1.2 MG/DL
ALBUMIN/GLOB SERPL: 1.5 G/DL
ALP SERPL-CCNC: 88 U/L (ref 39–117)
ALT SERPL W P-5'-P-CCNC: 15 U/L (ref 1–33)
ANION GAP SERPL CALCULATED.3IONS-SCNC: 11.4 MMOL/L (ref 5–15)
AST SERPL-CCNC: 27 U/L (ref 1–32)
BILIRUB SERPL-MCNC: 0.3 MG/DL (ref 0–1.2)
BUN SERPL-MCNC: 13 MG/DL (ref 6–20)
BUN/CREAT SERPL: 13.4 (ref 7–25)
CALCIUM SPEC-SCNC: 8.8 MG/DL (ref 8.6–10.5)
CHLORIDE SERPL-SCNC: 109 MMOL/L (ref 98–107)
CHOLEST SERPL-MCNC: 137 MG/DL (ref 0–200)
CO2 SERPL-SCNC: 20.6 MMOL/L (ref 22–29)
CREAT SERPL-MCNC: 0.97 MG/DL (ref 0.57–1)
CREAT UR-MCNC: 40.3 MG/DL
EGFRCR SERPLBLD CKD-EPI 2021: 70.9 ML/MIN/1.73
EXPIRATION DATE: ABNORMAL
EXPIRATION DATE: ABNORMAL
GLOBULIN UR ELPH-MCNC: 2.6 GM/DL
GLUCOSE BLDC GLUCOMTR-MCNC: 161 MG/DL (ref 70–130)
GLUCOSE SERPL-MCNC: 151 MG/DL (ref 65–99)
HBA1C MFR BLD: 7.4 % (ref 4.5–5.7)
HDLC SERPL-MCNC: 50 MG/DL (ref 40–60)
LDLC SERPL CALC-MCNC: 61 MG/DL (ref 0–100)
LDLC/HDLC SERPL: 1.12 {RATIO}
Lab: ABNORMAL
Lab: ABNORMAL
MICROALBUMIN/CREAT UR: NORMAL MG/G{CREAT}
POTASSIUM SERPL-SCNC: 4.1 MMOL/L (ref 3.5–5.2)
PROT SERPL-MCNC: 6.6 G/DL (ref 6–8.5)
SODIUM SERPL-SCNC: 141 MMOL/L (ref 136–145)
TRIGL SERPL-MCNC: 154 MG/DL (ref 0–150)
TSH SERPL DL<=0.05 MIU/L-ACNC: 1.7 UIU/ML (ref 0.27–4.2)
VLDLC SERPL-MCNC: 26 MG/DL (ref 5–40)

## 2023-11-29 PROCEDURE — 80061 LIPID PANEL: CPT | Performed by: INTERNAL MEDICINE

## 2023-11-29 PROCEDURE — 82043 UR ALBUMIN QUANTITATIVE: CPT | Performed by: INTERNAL MEDICINE

## 2023-11-29 PROCEDURE — 82570 ASSAY OF URINE CREATININE: CPT | Performed by: INTERNAL MEDICINE

## 2023-11-29 PROCEDURE — 80050 GENERAL HEALTH PANEL: CPT | Performed by: INTERNAL MEDICINE

## 2023-11-29 RX ORDER — TRIAMCINOLONE ACETONIDE 1 MG/G
CREAM TOPICAL
COMMUNITY
Start: 2023-10-30

## 2023-11-29 RX ORDER — TIRZEPATIDE 5 MG/.5ML
5 INJECTION, SOLUTION SUBCUTANEOUS
Qty: 2 ML | Refills: 1 | Status: SHIPPED | OUTPATIENT
Start: 2023-11-29 | End: 2023-12-01 | Stop reason: SDUPTHER

## 2023-11-29 NOTE — PATIENT INSTRUCTIONS
Start mounjaro 5 mg weekly (in place of trulicity). This dose can be increased monthly as tolerated to max dose.     If mounjaro isn't covered, continue trulicity and increase basaglar to 60 units at night.

## 2023-11-29 NOTE — PROGRESS NOTES
"Chief Complaint   Patient presents with    Diabetes          HPI   Patricia Brown is a 53 y.o. female had concerns including Diabetes.    She is checking blood sugars 4+ times per day with CGM. Data reviewed from the last two weeks shows average glucose 181 with variability of 43. In target range 50%, high 45%, very high 5%, low 0%, very low 0%.   Pattern of: overnight hyperglycemia    Current medications for diabetes include jardiance 25 mg daily, Trulicity 4.5 mg weekly, Basaglar 55 units daily, Fiasp 24 units with meals +2: 50 greater than 150    She was on Ozempic but developed tongue swelling.  Is tolerating the Trulicity without issue. Appetite isn't as suppressed.    The following portions of the patient's history were reviewed and updated as appropriate: allergies, current medications, past family history, past medical history, past social history, past surgical history, and problem list.      Review of Systems   Constitutional: Negative.    Endocrine:        See HPI   Skin:  Positive for rash.        Physical Exam  Vitals reviewed.   Constitutional:       Appearance: Normal appearance. She is obese.      Comments: Body mass index is 42.16 kg/m².   Cardiovascular:      Rate and Rhythm: Normal rate.   Pulmonary:      Effort: Pulmonary effort is normal.   Neurological:      General: No focal deficit present.      Mental Status: She is alert. Mental status is at baseline.   Psychiatric:         Mood and Affect: Mood normal.         Behavior: Behavior normal.        /82   Pulse 79   Ht 160 cm (63\")   Wt 108 kg (238 lb)   SpO2 97%   BMI 42.16 kg/m²      Labs and imaging    CMP:  Lab Results   Component Value Date    BUN 12 11/05/2022    CREATININE 0.93 11/05/2022    EGFR 75.0 11/05/2022    BCR 12.9 11/05/2022     11/05/2022    K 4.3 11/05/2022    CO2 21.8 (L) 11/05/2022    CALCIUM 9.1 11/05/2022    ALBUMIN 4.60 11/05/2022    BILITOT 0.2 11/05/2022    ALKPHOS 95 11/05/2022    AST 47 (H) " 11/05/2022    ALT 17 11/05/2022     Lipid Panel:  Lab Results   Component Value Date    CHOL 155 11/05/2022    TRIG 205 (H) 11/05/2022    HDL 62 (H) 11/05/2022    VLDL 33 11/05/2022    LDL 60 11/05/2022     HbA1c:  Lab Results   Component Value Date    HGBA1C 7.4 (A) 11/29/2023    HGBA1C 6.8 07/11/2023     Glucose:    Lab Results   Component Value Date    POCGLU 161 (A) 11/29/2023     Microalbumin:  Lab Results   Component Value Date    MALBCRERATIO  11/17/2022      Comment:      Unable to calculate     TSH:  Lab Results   Component Value Date    TSH 1.410 11/05/2022       Assessment and plan  Diagnoses and all orders for this visit:    1. Type 2 diabetes mellitus with hyperglycemia, with long-term current use of insulin (Primary)  Uncontrolled with hyperglycemia.  A1c up to 7.4.  No complications from diabetes.  No metformin due to history of GI intolerance.  Continue Basaglar 55 units for now.  Increase to 60 units if she stays on Trulicity.  Continue Fiasp 24 units with meals +2: 50 greater than 150.    Continue Jardiance 25 mg daily.  Discontinue Trulicity and transition to Mounjaro.  Titrate up on dose monthly as tolerated.  Caution for possible GI side effects.  She did not tolerate Ozempic due to tongue swelling but has not had any problems with Trulicity.  Cautioned her that she may have an allergic response to Mounjaro and should be discontinued immediately.  Monofilament up-to-date from July.  Labs are due and will be checked today.  Ophtho exam is up-to-date from May.  -     POC Glucose, Blood  -     POC Glycosylated Hemoglobin (Hb A1C)  -     Tirzepatide (Mounjaro) 5 MG/0.5ML solution pen-injector; Inject 0.5 mL under the skin into the appropriate area as directed Every 7 (Seven) Days.  Dispense: 2 mL; Refill: 1  -     CBC (No Diff)  -     Comprehensive Metabolic Panel  -     Microalbumin / Creatinine Urine Ratio - Urine, Clean Catch  -     TSH    2. Mixed hyperlipidemia  Check fasting lipid panel today.   On atorvastatin 40 mg daily.  -     Lipid Panel         Return in about 4 months (around 3/29/2024) for next scheduled follow up. The patient was instructed to contact the clinic with any interval questions or concerns.    Callie Ramirez, DO   Endocrinologist    Please note that portions of this note were completed with a voice recognition program.

## 2023-11-30 LAB
DEPRECATED RDW RBC AUTO: 43.8 FL (ref 37–54)
ERYTHROCYTE [DISTWIDTH] IN BLOOD BY AUTOMATED COUNT: 13.9 % (ref 12.3–15.4)
HCT VFR BLD AUTO: 42.1 % (ref 34–46.6)
HGB BLD-MCNC: 13.8 G/DL (ref 12–15.9)
MCH RBC QN AUTO: 28.4 PG (ref 26.6–33)
MCHC RBC AUTO-ENTMCNC: 32.8 G/DL (ref 31.5–35.7)
MCV RBC AUTO: 86.6 FL (ref 79–97)
PLATELET # BLD AUTO: 178 10*3/MM3 (ref 140–450)
PMV BLD AUTO: 11.7 FL (ref 6–12)
RBC # BLD AUTO: 4.86 10*6/MM3 (ref 3.77–5.28)
WBC NRBC COR # BLD AUTO: 8.01 10*3/MM3 (ref 3.4–10.8)

## 2023-12-01 ENCOUNTER — TELEPHONE (OUTPATIENT)
Dept: ENDOCRINOLOGY | Facility: CLINIC | Age: 53
End: 2023-12-01
Payer: COMMERCIAL

## 2023-12-01 DIAGNOSIS — Z79.4 TYPE 2 DIABETES MELLITUS WITH HYPERGLYCEMIA, WITH LONG-TERM CURRENT USE OF INSULIN: ICD-10-CM

## 2023-12-01 DIAGNOSIS — E11.65 TYPE 2 DIABETES MELLITUS WITH HYPERGLYCEMIA, WITH LONG-TERM CURRENT USE OF INSULIN: ICD-10-CM

## 2023-12-01 NOTE — TELEPHONE ENCOUNTER
Caller: Patricia Brown    Relationship: Self    Best call back number: [phone]    Requested Prescriptions:   - Tirzepatide (Mounjaro) 5 MG/0.5ML solution pen-injector [[id number]] (Order [id number])     Pharmacy where request should be sent:    [facility]/pharmacy #[id number] - [address] - [phone]  - [phone]     Last office visit with prescribing clinician: 11/29/2023     Next office visit with prescribing clinician: 4/29/2024     Additional details provided by patient: PATIENT WAS TOLD BY HER PHARMACY THEY DID NOT RECEIVE THE PRESCRIPTION. IT WAS SENT WEDNESDAY BUT THEY ARE STATING IT IS NOT IN THEIR QUEUE. SHE IS REQUESTING IT BE SENT AGAIN AS SOON AS POSSIBLE AS SHE IS SET TO START FOR THE WEEK ON SUNDAY AND HAS NONE.     Does the patient have less than a 3 day supply:  [x] Yes  [] No    Would you like a call back once the refill request has been completed: [x] Yes [] No    If the office needs to give you a call back, can they leave a voicemail: [x] Yes [] No

## 2023-12-04 ENCOUNTER — PRIOR AUTHORIZATION (OUTPATIENT)
Dept: ENDOCRINOLOGY | Facility: CLINIC | Age: 53
End: 2023-12-04
Payer: COMMERCIAL

## 2023-12-04 ENCOUNTER — TELEPHONE (OUTPATIENT)
Dept: ENDOCRINOLOGY | Facility: CLINIC | Age: 53
End: 2023-12-04
Payer: COMMERCIAL

## 2023-12-04 NOTE — TELEPHONE ENCOUNTER
BRIAN HERNANDEZ Key: [id number] - PA Case ID: [id number] - Rx #: [id number] help? Call us at [phone]  Outcome  Approvedtoday  Your PA request has been approved. Additional information will be provided in the approval communication. (Message 1145)  Drug  Mounjaro 5MG/0.5ML pen-injectors  Form  [facility] Electronic PA Form (2017 NCPDP)

## 2023-12-04 NOTE — TELEPHONE ENCOUNTER
PATIENT STATES THAT MOUNJARO IS REQUIRING A PRIOR AUTH. PATIENT EXPECTED THAT TO BE COMPLETED LAST WEEK.     CALL BACK [phone]

## 2023-12-11 DIAGNOSIS — I10 ESSENTIAL HYPERTENSION: Chronic | ICD-10-CM

## 2023-12-11 DIAGNOSIS — Z76.0 MEDICATION REFILL: ICD-10-CM

## 2023-12-11 RX ORDER — ENALAPRIL MALEATE 5 MG/1
5 TABLET ORAL DAILY
Qty: 30 TABLET | Refills: 0 | Status: SHIPPED | OUTPATIENT
Start: 2023-12-11

## 2023-12-11 NOTE — TELEPHONE ENCOUNTER
Rx Refill Note  Requested Prescriptions     Pending Prescriptions Disp Refills    enalapril (VASOTEC) 5 MG tablet [Pharmacy Med Name: ENALAPRIL MALEATE 5 MG TABLET] 90 tablet 3     Sig: TAKE 1 TABLET BY MOUTH EVERY DAY      Last office visit with prescribing clinician: 11/5/2022   Last telemedicine visit with prescribing clinician: Visit date not found   Next office visit with prescribing clinician: Visit date not found                         Would you like a call back once the refill request has been completed: [] Yes [] No    If the office needs to give you a call back, can they leave a voicemail: [] Yes [] No    Brittany Mendes  12/11/23, 10:39 EST

## 2023-12-23 DIAGNOSIS — E11.65 TYPE 2 DIABETES MELLITUS WITH HYPERGLYCEMIA, WITH LONG-TERM CURRENT USE OF INSULIN: ICD-10-CM

## 2023-12-23 DIAGNOSIS — Z79.4 TYPE 2 DIABETES MELLITUS WITH HYPERGLYCEMIA, WITH LONG-TERM CURRENT USE OF INSULIN: ICD-10-CM

## 2023-12-26 RX ORDER — PROCHLORPERAZINE 25 MG/1
SUPPOSITORY RECTAL
Qty: 1 EACH | Refills: 2 | Status: SHIPPED | OUTPATIENT
Start: 2023-12-26 | End: 2023-12-27 | Stop reason: SDUPTHER

## 2023-12-26 NOTE — TELEPHONE ENCOUNTER
Rx Refill Note  Requested Prescriptions     Pending Prescriptions Disp Refills    Continuous Blood Gluc Transmit (Dexcom G6 Transmitter) misc [Pharmacy Med Name: DEXCOM G6 TRANSMITTER] 1 each 3     Sig: USE AS DIRECTED EVERY DAY AND CHANGE EVERY 90 DAYS      Last office visit with prescribing clinician: 11/29/2023     Next office visit with prescribing clinician: 4/29/2024       Michael Rodriguez MA  12/26/23, 08:32 EST

## 2023-12-27 ENCOUNTER — TELEPHONE (OUTPATIENT)
Dept: ENDOCRINOLOGY | Facility: CLINIC | Age: 53
End: 2023-12-27
Payer: COMMERCIAL

## 2023-12-27 DIAGNOSIS — E11.65 TYPE 2 DIABETES MELLITUS WITH HYPERGLYCEMIA, WITH LONG-TERM CURRENT USE OF INSULIN: Primary | ICD-10-CM

## 2023-12-27 DIAGNOSIS — E11.65 TYPE 2 DIABETES MELLITUS WITH HYPERGLYCEMIA, WITH LONG-TERM CURRENT USE OF INSULIN: ICD-10-CM

## 2023-12-27 DIAGNOSIS — Z79.4 TYPE 2 DIABETES MELLITUS WITH HYPERGLYCEMIA, WITH LONG-TERM CURRENT USE OF INSULIN: ICD-10-CM

## 2023-12-27 DIAGNOSIS — Z79.4 TYPE 2 DIABETES MELLITUS WITH HYPERGLYCEMIA, WITH LONG-TERM CURRENT USE OF INSULIN: Primary | ICD-10-CM

## 2023-12-27 RX ORDER — BLOOD-GLUCOSE METER
1 KIT MISCELLANEOUS 4 TIMES DAILY
Qty: 1 EACH | Refills: 0 | Status: SHIPPED | OUTPATIENT
Start: 2023-12-27

## 2023-12-27 RX ORDER — PROCHLORPERAZINE 25 MG/1
1 SUPPOSITORY RECTAL
Qty: 1 EACH | Refills: 3 | Status: SHIPPED | OUTPATIENT
Start: 2023-12-27

## 2023-12-27 NOTE — TELEPHONE ENCOUNTER
Spoke with patient.  She states that her sensor was not working properly.  Dexcom is sending her a replacement.

## 2023-12-27 NOTE — TELEPHONE ENCOUNTER
Jaymie Aldana is a 38year old female. No chief complaint on file. Virtual/Telephone Check-In    Jaymie Aldana verbally consents to a Virtual/Telephone Check-In service on 12/09/20.   Patient has been referred to the [facility] website at [email or web address] PT CALLED AND SAID SHE TOOK MONJAURO FOR 3 WEEKS AND TOLERATED IT SO SHE SAID SHE COULD GO TO THE NEXT DOSE. SHE SAID SHE WILL BE OUT SOON SO SHE NEEDS IT FILLED ASAP.   DEXCOM HAS GIVEN HER TROUBLE SO SHE WENT BACK TO [facility] AND IT HAS MESSED UP AS WELL. SHE ASKED IF SHE COULD GET A NEW METER AND TEST STRIPS SENT TO HER PHARMACY ASAP.   MD at [facility] MAIN OR   • KNEE ARTHROSCOPY      left knee   • LAP GASTRIC BYPASS/SLICK-EN-Y     • OTHER SURGICAL HISTORY Right 02/2017    vein ablation   • REPAIR DETACH RETINA,SCLERAL BUCKLE Left 03/05/2018   • REPAIR RETINAL DETACH, SCLERAL BUCKLE - OD - [facility] dyspnea. She appears very comfortable. I was not able to see her scratch site properly through video  Physical Exam      ASSESSMENT AND PLAN:   1.  Cellulitis, unspecified cellulitis site   -As patient reports that the puppy has been vaccinated, I have en

## 2024-01-08 DIAGNOSIS — I10 ESSENTIAL HYPERTENSION: Chronic | ICD-10-CM

## 2024-01-08 DIAGNOSIS — Z76.0 MEDICATION REFILL: ICD-10-CM

## 2024-01-08 RX ORDER — ENALAPRIL MALEATE 5 MG/1
5 TABLET ORAL DAILY
Qty: 30 TABLET | Refills: 0 | Status: SHIPPED | OUTPATIENT
Start: 2024-01-08

## 2024-01-08 NOTE — TELEPHONE ENCOUNTER
Rx Refill Note  Requested Prescriptions     Pending Prescriptions Disp Refills    enalapril (VASOTEC) 5 MG tablet [Pharmacy Med Name: ENALAPRIL MALEATE 5 MG TABLET] 30 tablet 0     Sig: TAKE 1 TABLET BY MOUTH EVERY DAY      Last office visit with prescribing clinician: Visit date not found   Last telemedicine visit with prescribing clinician: Visit date not found   Next office visit with prescribing clinician: Visit date not found                         Would you like a call back once the refill request has been completed: [] Yes [] No    If the office needs to give you a call back, can they leave a voicemail: [] Yes [] No    Vicky Santoro MA  01/08/24, 07:45 EST

## 2024-02-12 DIAGNOSIS — I10 ESSENTIAL HYPERTENSION: Chronic | ICD-10-CM

## 2024-02-12 DIAGNOSIS — Z76.0 MEDICATION REFILL: ICD-10-CM

## 2024-02-12 RX ORDER — ENALAPRIL MALEATE 5 MG/1
5 TABLET ORAL DAILY
Qty: 30 TABLET | Refills: 0 | Status: SHIPPED | OUTPATIENT
Start: 2024-02-12

## 2024-02-20 NOTE — TELEPHONE ENCOUNTER
Rx Refill Note  Requested Prescriptions      No prescriptions requested or ordered in this encounter        Last office visit with prescribing clinician: 11/29/2023      Next office visit with prescribing clinician: 4/29/2024       Amanda Ruggiero (Jodi)  02/20/24, 14:25 EST     Pended [id number]

## 2024-02-20 NOTE — TELEPHONE ENCOUNTER
Patient has been using Mounjaro 7.5 mg for 2 months and feels she is ready for another dose increase. Patient reports no ill effects at current dose.     [facility]

## 2024-02-21 RX ORDER — TIRZEPATIDE 10 MG/.5ML
10 INJECTION, SOLUTION SUBCUTANEOUS WEEKLY
Qty: 2 ML | Refills: 2 | Status: SHIPPED | OUTPATIENT
Start: 2024-02-21

## 2024-02-22 ENCOUNTER — OFFICE VISIT (OUTPATIENT)
Dept: OBSTETRICS AND GYNECOLOGY | Facility: CLINIC | Age: 54
End: 2024-02-22
Payer: COMMERCIAL

## 2024-02-22 VITALS — RESPIRATION RATE: 14 BRPM | BODY MASS INDEX: 41.81 KG/M2 | WEIGHT: 236 LBS

## 2024-02-22 DIAGNOSIS — R87.612 LGSIL ON PAP SMEAR OF CERVIX: ICD-10-CM

## 2024-02-22 DIAGNOSIS — Z01.42 PAP SMEAR TO CONFIRM RECENT NORMAL SMEAR FOLLOWING INITIAL ABNORMAL SMEAR: Primary | ICD-10-CM

## 2024-02-22 DIAGNOSIS — N89.3 VAGINAL DYSPLASIA: ICD-10-CM

## 2024-02-22 DIAGNOSIS — R87.613 HIGH GRADE INTREPITH LESION CYTO SMR CRVX (HGSIL): ICD-10-CM

## 2024-02-22 NOTE — PROGRESS NOTES
Subjective   Chief Complaint   Patient presents with    Abnormal Pap Smear     Patricia Brown is a 53 y.o. year old  presenting to be seen for a PAP in follow-up of a prior abnormal PAP and/or HPV screen.  Cycles have been getting more erratic.  They are coming less often than monthly.    The following portions of the patient's history were reviewed and updated as appropriate:no additional history reviewed.    Social History    Tobacco Use      Smoking status: Former        Packs/day: 1.00        Years: 20.00        Additional pack years: 0.00        Total pack years: 20.00        Types: Cigarettes        Start date: 1985        Quit date: 2005        Years since quittin.1      Smokeless tobacco: Never    Review of Systems  Constitutional POS: nothing reported    NEG: anorexia or night sweats   Genitourinary POS: nothing reported    NEG: dysuria or hematuria      Gastointestinal POS: nothing reported    NEG: bloating, change in bowel habits, melena, or reflux symptoms   Integument POS: nothing reported    NEG: moles that are changing in size, shape, color or rashes   Breast POS: nothing reported    NEG: persistent breast lump, skin dimpling, or nipple discharge        Objective   Resp 14   Wt 107 kg (236 lb)   LMP 2024 (Approximate)   BMI 41.81 kg/m²     General:  well developed; well nourished  no acute distress   Pelvis: Clinical staff was present for exam  External genitalia:  normal appearance of the external genitalia including Bartholin's and Houston's glands.  :  urethral meatus normal;  Vaginal:  normal pink mucosa without prolapse or lesions.     Lab Review   No data reviewed    Imaging   No data reviewed       Assessment   LGSIL and VAIN - 2 normal PAP since  Oligomenorrhea related to perimenopause.  Unless bleeding becomes too frequent nothing needs to be done at this point     Plan   Pap was done today.  If she does not receive the results of the Pap within 2 weeks   time, she was instructed to call to find out the results.  I explained to Patricia that because she is being seen in follow-up of a previously abnormal Pap smear, her next Pap needs to be performed in 4-6 months.  She will need to be seen roughly every 6 months until 3 consecutive normal Paps have been obtained.  At that point, she can return to routine screening intervals.  The importance of keeping all planned follow-up and taking all medications as prescribed was emphasized.  Follow up for annual exam 1 year           This note was electronically signed.    Tyson Iyer M.D.  February 22, 2024    Part of this note may be an electronic transcription/translation of spoken language to printed text using the Dragon Dictation System.

## 2024-02-24 DIAGNOSIS — Z79.4 TYPE 2 DIABETES MELLITUS WITH HYPERGLYCEMIA, WITH LONG-TERM CURRENT USE OF INSULIN: ICD-10-CM

## 2024-02-24 DIAGNOSIS — E11.65 TYPE 2 DIABETES MELLITUS WITH HYPERGLYCEMIA, WITH LONG-TERM CURRENT USE OF INSULIN: ICD-10-CM

## 2024-02-27 ENCOUNTER — TELEPHONE (OUTPATIENT)
Dept: ENDOCRINOLOGY | Facility: CLINIC | Age: 54
End: 2024-02-27
Payer: COMMERCIAL

## 2024-02-27 RX ORDER — TIRZEPATIDE 7.5 MG/.5ML
INJECTION, SOLUTION SUBCUTANEOUS
Refills: 1 | OUTPATIENT
Start: 2024-02-27

## 2024-02-27 NOTE — TELEPHONE ENCOUNTER
Rx Refill Note  Requested Prescriptions     Pending Prescriptions Disp Refills    Mounjaro 7.5 MG/0.5ML solution pen-injector pen [Pharmacy Med Name: MOUNJARO 7.5 MG/0.5 ML PEN]  1     Sig: INJECT 0.5 ML UNDER THE SKIN INTO THE APPROPRIATE AREA AS DIRECTED EVERY 7 (SEVEN) DAYS.      Last office visit with prescribing clinician: 11/29/2023     Next office visit with prescribing clinician: 4/29/2024       Michael Rodriguez MA  02/27/24, 12:43 EST

## 2024-02-27 NOTE — TELEPHONE ENCOUNTER
PATIENT STATES THAT HER PHARMACY DOES NOT HAVE MOUNJARO 10 MG AVAILABLE AND WOULD LIKE TO KNOW IF SHE CAN GET AN RX FOR MOUNJARO 7.5 MG TO USE UNTIL THE 10 MG DOSE IS AVAILABLE.     [facility] [place]     CALL BACK [phone]

## 2024-02-29 LAB — REF LAB TEST METHOD: NORMAL

## 2024-03-12 DIAGNOSIS — Z76.0 MEDICATION REFILL: ICD-10-CM

## 2024-03-12 DIAGNOSIS — I10 ESSENTIAL HYPERTENSION: Chronic | ICD-10-CM

## 2024-03-12 RX ORDER — DULAGLUTIDE 4.5 MG/.5ML
INJECTION, SOLUTION SUBCUTANEOUS
Refills: 1 | OUTPATIENT
Start: 2024-03-12

## 2024-03-12 RX ORDER — ENALAPRIL MALEATE 5 MG/1
5 TABLET ORAL DAILY
Qty: 30 TABLET | Refills: 0 | Status: SHIPPED | OUTPATIENT
Start: 2024-03-12

## 2024-03-12 NOTE — TELEPHONE ENCOUNTER
Rx Refill Note  Requested Prescriptions     Pending Prescriptions Disp Refills    enalapril (VASOTEC) 5 MG tablet 30 tablet 0     Sig: Take 1 tablet by mouth Daily.      Last office visit with prescribing clinician: Visit date not found   Last telemedicine visit with prescribing clinician: Visit date not found   Next office visit with prescribing clinician: Visit date not found                         Would you like a call back once the refill request has been completed: [] Yes [] No    If the office needs to give you a call back, can they leave a voicemail: [] Yes [] No    Marcela Swenson LPN  03/12/24, 09:15 EDT

## 2024-03-19 ENCOUNTER — TELEPHONE (OUTPATIENT)
Dept: ENDOCRINOLOGY | Facility: CLINIC | Age: 54
End: 2024-03-19

## 2024-03-19 RX ORDER — DULAGLUTIDE 4.5 MG/.5ML
INJECTION, SOLUTION SUBCUTANEOUS
Refills: 1 | OUTPATIENT
Start: 2024-03-19

## 2024-03-19 NOTE — TELEPHONE ENCOUNTER
Rx Refill Note  Requested Prescriptions     Pending Prescriptions Disp Refills    Trulicity 4.5 MG/0.5ML solution pen-injector [Pharmacy Med Name: TRULICITY 4.5 MG/0.5 ML PEN]  1     Sig: INJECT 4.5MG UNDER THE SKIN INTO THE APPROPRIATE AREA AS DIRECTED EVERY 7 (SEVEN) DAYS.      Last office visit with prescribing clinician: 11/29/2023     Next office visit with prescribing clinician: 4/29/2024

## 2024-03-19 NOTE — TELEPHONE ENCOUNTER
Spoke with patient.  She states that she has been unable to get the mounjaro.  She states she was able to tolerate the 4.5mg dose of Trulicity that she had at home and she is requesting a new rx.

## 2024-03-19 NOTE — TELEPHONE ENCOUNTER
Patient called to check status of Trulicity prescription, said she had asked about this and pharmacy has faxed requests for this as well    To send to:  [id number] [place] - [address] - [phone]  - [phone] FX

## 2024-03-20 DIAGNOSIS — E11.65 TYPE 2 DIABETES MELLITUS WITH HYPERGLYCEMIA, WITH LONG-TERM CURRENT USE OF INSULIN: Primary | ICD-10-CM

## 2024-03-20 DIAGNOSIS — E78.2 MIXED HYPERLIPIDEMIA: ICD-10-CM

## 2024-03-20 DIAGNOSIS — Z79.4 TYPE 2 DIABETES MELLITUS WITH HYPERGLYCEMIA, WITH LONG-TERM CURRENT USE OF INSULIN: Primary | ICD-10-CM

## 2024-03-20 RX ORDER — ATORVASTATIN CALCIUM 40 MG/1
TABLET, FILM COATED ORAL
Qty: 90 TABLET | Refills: 0 | Status: SHIPPED | OUTPATIENT
Start: 2024-03-20

## 2024-03-20 RX ORDER — DULAGLUTIDE 4.5 MG/.5ML
4.5 INJECTION, SOLUTION SUBCUTANEOUS
Qty: 2 ML | Refills: 3 | Status: SHIPPED | OUTPATIENT
Start: 2024-03-20

## 2024-03-20 NOTE — TELEPHONE ENCOUNTER
Rx Refill Note  Requested Prescriptions     Pending Prescriptions Disp Refills    atorvastatin (LIPITOR) 40 MG tablet [Pharmacy Med Name: ATORVASTATIN 40 MG TABLET] 90 tablet 1     Sig: TAKE 1 TABLET BY MOUTH EVERYDAY AT BEDTIME        Last office visit with prescribing clinician: 11/29/2023      Next office visit with prescribing clinician: 4/29/2024       Amanda Ruggiero (Jodi)  03/20/24, 14:22 EDT

## 2024-04-15 DIAGNOSIS — I10 ESSENTIAL HYPERTENSION: Chronic | ICD-10-CM

## 2024-04-15 DIAGNOSIS — Z76.0 MEDICATION REFILL: ICD-10-CM

## 2024-04-15 RX ORDER — ENALAPRIL MALEATE 5 MG/1
5 TABLET ORAL DAILY
Qty: 30 TABLET | Refills: 1 | Status: SHIPPED | OUTPATIENT
Start: 2024-04-15

## 2024-04-29 ENCOUNTER — OFFICE VISIT (OUTPATIENT)
Dept: ENDOCRINOLOGY | Facility: CLINIC | Age: 54
End: 2024-04-29
Payer: COMMERCIAL

## 2024-04-29 VITALS
SYSTOLIC BLOOD PRESSURE: 120 MMHG | WEIGHT: 236 LBS | OXYGEN SATURATION: 99 % | DIASTOLIC BLOOD PRESSURE: 80 MMHG | HEART RATE: 70 BPM | HEIGHT: 63 IN | BODY MASS INDEX: 41.82 KG/M2

## 2024-04-29 DIAGNOSIS — E78.2 MIXED HYPERLIPIDEMIA: ICD-10-CM

## 2024-04-29 DIAGNOSIS — E11.65 TYPE 2 DIABETES MELLITUS WITH HYPERGLYCEMIA, WITH LONG-TERM CURRENT USE OF INSULIN: Primary | ICD-10-CM

## 2024-04-29 DIAGNOSIS — Z79.4 TYPE 2 DIABETES MELLITUS WITH HYPERGLYCEMIA, WITH LONG-TERM CURRENT USE OF INSULIN: Primary | ICD-10-CM

## 2024-04-29 LAB
EXPIRATION DATE: ABNORMAL
EXPIRATION DATE: ABNORMAL
GLUCOSE BLDC GLUCOMTR-MCNC: 231 MG/DL (ref 70–130)
HBA1C MFR BLD: 7.5 % (ref 4.5–5.7)
Lab: ABNORMAL
Lab: ABNORMAL

## 2024-04-29 PROCEDURE — 99214 OFFICE O/P EST MOD 30 MIN: CPT | Performed by: INTERNAL MEDICINE

## 2024-04-29 PROCEDURE — 83036 HEMOGLOBIN GLYCOSYLATED A1C: CPT | Performed by: INTERNAL MEDICINE

## 2024-04-29 PROCEDURE — 95251 CONT GLUC MNTR ANALYSIS I&R: CPT | Performed by: INTERNAL MEDICINE

## 2024-04-29 RX ORDER — INSULIN GLARGINE 100 [IU]/ML
55 INJECTION, SOLUTION SUBCUTANEOUS NIGHTLY
Qty: 60 ML | Refills: 1 | Status: SHIPPED | OUTPATIENT
Start: 2024-04-29

## 2024-04-29 RX ORDER — ACYCLOVIR 400 MG/1
1 TABLET ORAL
Qty: 9 EACH | Refills: 3 | Status: SHIPPED | OUTPATIENT
Start: 2024-04-29

## 2024-04-29 RX ORDER — ATORVASTATIN CALCIUM 40 MG/1
40 TABLET, FILM COATED ORAL
Qty: 90 TABLET | Refills: 3 | Status: SHIPPED | OUTPATIENT
Start: 2024-04-29

## 2024-04-29 RX ORDER — INSULIN ASPART INJECTION 100 [IU]/ML
INJECTION, SOLUTION SUBCUTANEOUS
Qty: 75 ML | Refills: 1 | Status: SHIPPED | OUTPATIENT
Start: 2024-04-29

## 2024-04-29 RX ORDER — DULAGLUTIDE 4.5 MG/.5ML
4.5 INJECTION, SOLUTION SUBCUTANEOUS
Qty: 2 ML | Refills: 3
Start: 2024-04-29

## 2024-04-29 RX ORDER — ACYCLOVIR 400 MG/1
1 TABLET ORAL DAILY
Qty: 1 EACH | Refills: 0 | Status: SHIPPED | OUTPATIENT
Start: 2024-04-29

## 2024-04-29 NOTE — PROGRESS NOTES
"Chief Complaint   Patient presents with    Diabetes     Type II          HPI   Patricia Brown is a 54 y.o. female had concerns including Diabetes (Type II).    Checking glucose 4 + times daily with CGM. Data reviewed from the last two weeks shows average glucose 198 with variability of 18.2%. In target range 31%, high 62%, very high 7%, low 0%, very low 0%.   Pattern of: postprandial hyperglycemia    Current medications for diabetes include Jardiance 25 mg daily, Trulicity to 4.5 mg weekly (ran out last week - two), Basaglar 55 units daily, Fiasp 20 to 24 units.  Changed back to trulicity from mounjaro due to supply. [facility] ([place] and they are out also.     Her  is cooking. Is eating more carbs. Not exercising.     She is on atorvastatin 40 mg daily.    The following portions of the patient's history were reviewed and updated as appropriate: allergies, current medications, past family history, past medical history, past social history, past surgical history, and problem list.      Review of Systems   Constitutional: Negative.    Endocrine: Negative.         See HPI        Physical Exam  Vitals reviewed.   Constitutional:       Appearance: Normal appearance. She is obese.      Comments: Body mass index is 41.81 kg/m².   Cardiovascular:      Rate and Rhythm: Normal rate.   Pulmonary:      Effort: Pulmonary effort is normal.   Neurological:      General: No focal deficit present.      Mental Status: She is alert. Mental status is at baseline.   Psychiatric:         Mood and Affect: Mood normal.         Behavior: Behavior normal.        /80 (BP Location: Left arm, Patient Position: Sitting, Cuff Size: Adult)   Pulse 70   Ht 160 cm (63\")   Wt 107 kg (236 lb)   SpO2 99%   BMI 41.81 kg/m²      Labs and imaging    CMP:  Lab Results   Component Value Date    BUN 13 11/29/2023    CREATININE 0.97 11/29/2023    EGFR 70.9 11/29/2023    BCR 13.4 11/29/2023     11/29/2023    K 4.1 " 11/29/2023    CO2 20.6 (L) 11/29/2023    CALCIUM 8.8 11/29/2023    ALBUMIN 4.0 11/29/2023    BILITOT 0.3 11/29/2023    ALKPHOS 88 11/29/2023    AST 27 11/29/2023    ALT 15 11/29/2023     Lipid Panel:  Lab Results   Component Value Date    CHOL 137 11/29/2023    TRIG 154 (H) 11/29/2023    HDL 50 11/29/2023    VLDL 26 11/29/2023    LDL 61 11/29/2023     HbA1c:  Lab Results   Component Value Date    HGBA1C 7.5 (A) 04/29/2024    HGBA1C 7.4 (A) 11/29/2023     Glucose:  Lab Results   Component Value Date    POCGLU 231 (A) 04/29/2024     Microalbumin:  Lab Results   Component Value Date    MALBCRERATIO  11/29/2023      Comment:      Unable to calculate     TSH:  Lab Results   Component Value Date    TSH 1.700 11/29/2023       Assessment and plan  Diagnoses and all orders for this visit:    1. Type 2 diabetes mellitus with hyperglycemia, with long-term current use of insulin (Primary)  Uncontrolled with hyperglycemia.  A1c 7.5.  No other complications from diabetes.  No metformin due to history of GI intolerance.  Resume Trulicity 4.5 mg weekly.  Due to supply issues she has been out of the last 1 to 2 weeks.  Check surrounding pharmacies and alternate doses.  She was previously on Mounjaro up to 7.5 mg and tolerating well.  Hopes to transition back to Mounjaro when able.  Decrease carb intake.  Increase protein and fiber.  If postprandial glucose levels are greater than 200 consistently, increase doses of insulin by 5 units.  Update requested in a few weeks reporting glucose readings.  Monofilament up-to-date from July.  Ophtho exam up-to-date from May 2023.  -     POC Glucose, Blood  -     POC Glycosylated Hemoglobin (Hb A1C)  -     Dulaglutide (Trulicity) 4.5 MG/0.5ML solution pen-injector; Inject 0.5 mL under the skin into the appropriate area as directed Every 7 (Seven) Days.  Dispense: 2 mL; Refill: 3  -     Ambulatory Referral to Diabetic Education  -     Insulin Aspart, w/Niacinamide, (Fiasp FlexTouch) 100 UNIT/ML  solution pen-injector; 20-24 units with meals plus 2:50>150, MDD 80 units  Dispense: 75 mL; Refill: 1  -     Insulin Glargine (BASAGLAR KWIKPEN) 100 UNIT/ML injection pen; Inject 55 Units under the skin into the appropriate area as directed Every Night.  Dispense: 60 mL; Refill: 1  -     Continuous Glucose  (Dexcom G7 ) device; Use 1 each Daily.  Dispense: 1 each; Refill: 0  -     Continuous Glucose Sensor (Dexcom G7 Sensor) misc; Use 1 each Every 10 (Ten) Days.  Dispense: 9 each; Refill: 3  -     empagliflozin (Jardiance) 25 MG tablet tablet; Take 1 tablet by mouth Daily.  Dispense: 90 tablet; Refill: 1    2. Mixed hyperlipidemia  Controlled on atorvastatin 40 mg daily.  Lipids up-to-date from November.  Refill sent to the pharmacy.  -     atorvastatin (LIPITOR) 40 MG tablet; Take 1 tablet by mouth every night at bedtime.  Dispense: 90 tablet; Refill: 3         Return in about 4 months (around 8/29/2024). The patient was instructed to contact the clinic with any interval questions or concerns.    Electronically signed by: Callie Ramirez DO   Endocrinologist    Please note that portions of this note were completed with a voice recognition program.

## 2024-05-06 ENCOUNTER — PRIOR AUTHORIZATION (OUTPATIENT)
Dept: ENDOCRINOLOGY | Facility: CLINIC | Age: 54
End: 2024-05-06
Payer: COMMERCIAL

## 2024-05-14 DIAGNOSIS — Z76.0 MEDICATION REFILL: ICD-10-CM

## 2024-05-14 DIAGNOSIS — I10 ESSENTIAL HYPERTENSION: Chronic | ICD-10-CM

## 2024-05-14 RX ORDER — ENALAPRIL MALEATE 5 MG/1
5 TABLET ORAL DAILY
Qty: 30 TABLET | Refills: 1 | Status: CANCELLED | OUTPATIENT
Start: 2024-05-14

## 2024-06-13 DIAGNOSIS — Z76.0 MEDICATION REFILL: ICD-10-CM

## 2024-06-13 DIAGNOSIS — I10 ESSENTIAL HYPERTENSION: Chronic | ICD-10-CM

## 2024-06-13 RX ORDER — ENALAPRIL MALEATE 5 MG/1
5 TABLET ORAL DAILY
Qty: 30 TABLET | Refills: 1 | OUTPATIENT
Start: 2024-06-13

## 2024-06-13 NOTE — TELEPHONE ENCOUNTER
I called Patient and scheduled an appointment to [facility] with Josee on 7/8/24 She has 5 days of her Enalapril Maleate 5 mg.  She is requesting a refill to get her through .

## 2024-06-17 DIAGNOSIS — Z76.0 MEDICATION REFILL: ICD-10-CM

## 2024-06-17 DIAGNOSIS — I10 ESSENTIAL HYPERTENSION: Chronic | ICD-10-CM

## 2024-06-17 RX ORDER — ENALAPRIL MALEATE 5 MG/1
5 TABLET ORAL DAILY
Qty: 30 TABLET | Refills: 1 | Status: CANCELLED | OUTPATIENT
Start: 2024-06-17

## 2024-06-17 NOTE — TELEPHONE ENCOUNTER
Patient has called in stating that she is now out of enalapril and that she has an appointment schedule to get est with Josee Branch. 7/8/24 at 3;30 pm.    She is asking for a refill at least to get her through until she can see Josee.

## 2024-06-18 DIAGNOSIS — Z76.0 MEDICATION REFILL: ICD-10-CM

## 2024-06-18 DIAGNOSIS — I10 ESSENTIAL HYPERTENSION: Chronic | ICD-10-CM

## 2024-06-18 RX ORDER — ENALAPRIL MALEATE 5 MG/1
5 TABLET ORAL DAILY
Qty: 90 TABLET | Refills: 0 | Status: SHIPPED | OUTPATIENT
Start: 2024-06-18

## 2024-06-18 NOTE — TELEPHONE ENCOUNTER
Patient has called multiple times regarding her enalapril 5 mg . She states she is completely out of this medication and has a new est appointment with Josee.

## 2024-06-24 DIAGNOSIS — E11.65 TYPE 2 DIABETES MELLITUS WITH HYPERGLYCEMIA, WITH LONG-TERM CURRENT USE OF INSULIN: ICD-10-CM

## 2024-06-24 DIAGNOSIS — Z79.4 TYPE 2 DIABETES MELLITUS WITH HYPERGLYCEMIA, WITH LONG-TERM CURRENT USE OF INSULIN: ICD-10-CM

## 2024-06-24 RX ORDER — BLOOD-GLUCOSE METER
EACH MISCELLANEOUS
Qty: 1 KIT | Refills: 1 | Status: SHIPPED | OUTPATIENT
Start: 2024-06-24

## 2024-07-08 ENCOUNTER — OFFICE VISIT (OUTPATIENT)
Dept: FAMILY MEDICINE CLINIC | Facility: CLINIC | Age: 54
End: 2024-07-08
Payer: COMMERCIAL

## 2024-07-08 ENCOUNTER — LAB (OUTPATIENT)
Dept: LAB | Facility: HOSPITAL | Age: 54
End: 2024-07-08
Payer: COMMERCIAL

## 2024-07-08 VITALS
HEIGHT: 63 IN | DIASTOLIC BLOOD PRESSURE: 84 MMHG | BODY MASS INDEX: 40.08 KG/M2 | WEIGHT: 226.2 LBS | HEART RATE: 83 BPM | TEMPERATURE: 98.8 F | OXYGEN SATURATION: 98 % | SYSTOLIC BLOOD PRESSURE: 118 MMHG

## 2024-07-08 DIAGNOSIS — I10 ESSENTIAL HYPERTENSION: Chronic | ICD-10-CM

## 2024-07-08 DIAGNOSIS — M25.551 RIGHT HIP PAIN: ICD-10-CM

## 2024-07-08 DIAGNOSIS — Z76.0 MEDICATION REFILL: ICD-10-CM

## 2024-07-08 DIAGNOSIS — L92.0 GRANULOMA ANNULARE: ICD-10-CM

## 2024-07-08 DIAGNOSIS — Z79.899 ENCOUNTER FOR LONG-TERM (CURRENT) USE OF MEDICATIONS: ICD-10-CM

## 2024-07-08 DIAGNOSIS — B00.9 HERPES SIMPLEX TYPE 1 INFECTION: ICD-10-CM

## 2024-07-08 DIAGNOSIS — I10 ESSENTIAL HYPERTENSION: Primary | Chronic | ICD-10-CM

## 2024-07-08 LAB
ALBUMIN SERPL-MCNC: 4.5 G/DL (ref 3.5–5.2)
ALBUMIN/GLOB SERPL: 1.4 G/DL
ALP SERPL-CCNC: 95 U/L (ref 39–117)
ALT SERPL W P-5'-P-CCNC: 17 U/L (ref 1–33)
ANION GAP SERPL CALCULATED.3IONS-SCNC: 10.5 MMOL/L (ref 5–15)
AST SERPL-CCNC: 29 U/L (ref 1–32)
BILIRUB SERPL-MCNC: 0.4 MG/DL (ref 0–1.2)
BUN SERPL-MCNC: 14 MG/DL (ref 6–20)
BUN/CREAT SERPL: 13.2 (ref 7–25)
CALCIUM SPEC-SCNC: 9.3 MG/DL (ref 8.6–10.5)
CHLORIDE SERPL-SCNC: 108 MMOL/L (ref 98–107)
CHOLEST SERPL-MCNC: 147 MG/DL (ref 0–200)
CO2 SERPL-SCNC: 21.5 MMOL/L (ref 22–29)
CREAT SERPL-MCNC: 1.06 MG/DL (ref 0.57–1)
EGFRCR SERPLBLD CKD-EPI 2021: 63.3 ML/MIN/1.73
GLOBULIN UR ELPH-MCNC: 3.3 GM/DL
GLUCOSE SERPL-MCNC: 91 MG/DL (ref 65–99)
HDLC SERPL-MCNC: 60 MG/DL (ref 40–60)
LDLC SERPL CALC-MCNC: 63 MG/DL (ref 0–100)
LDLC/HDLC SERPL: 0.97 {RATIO}
POTASSIUM SERPL-SCNC: 4 MMOL/L (ref 3.5–5.2)
PROT SERPL-MCNC: 7.8 G/DL (ref 6–8.5)
SODIUM SERPL-SCNC: 140 MMOL/L (ref 136–145)
TRIGL SERPL-MCNC: 143 MG/DL (ref 0–150)
VLDLC SERPL-MCNC: 24 MG/DL (ref 5–40)

## 2024-07-08 PROCEDURE — 80050 GENERAL HEALTH PANEL: CPT

## 2024-07-08 PROCEDURE — 36415 COLL VENOUS BLD VENIPUNCTURE: CPT

## 2024-07-08 PROCEDURE — 82607 VITAMIN B-12: CPT

## 2024-07-08 PROCEDURE — 84439 ASSAY OF FREE THYROXINE: CPT

## 2024-07-08 PROCEDURE — 80061 LIPID PANEL: CPT

## 2024-07-08 PROCEDURE — 80178 ASSAY OF LITHIUM: CPT

## 2024-07-08 PROCEDURE — 99214 OFFICE O/P EST MOD 30 MIN: CPT | Performed by: PHYSICIAN ASSISTANT

## 2024-07-08 RX ORDER — FAMCICLOVIR 500 MG/1
500 TABLET ORAL 3 TIMES DAILY
Qty: 15 TABLET | Refills: 3 | Status: SHIPPED | OUTPATIENT
Start: 2024-07-08

## 2024-07-08 RX ORDER — TRIAMCINOLONE ACETONIDE 1 MG/G
1 CREAM TOPICAL 2 TIMES DAILY
Qty: 80 G | Refills: 5 | Status: SHIPPED | OUTPATIENT
Start: 2024-07-08

## 2024-07-08 RX ORDER — TRIAMCINOLONE ACETONIDE 1 MG/G
1 CREAM TOPICAL 2 TIMES DAILY
COMMUNITY
End: 2024-07-08 | Stop reason: SDUPTHER

## 2024-07-08 RX ORDER — TIRZEPATIDE 10 MG/.5ML
INJECTION, SOLUTION SUBCUTANEOUS
COMMUNITY
Start: 2024-06-19

## 2024-07-08 NOTE — PROGRESS NOTES
Follow Up Office Visit    Date: 2024   Patient Name: Patricia Brown  : 1970   MRN: [id number]     Chief Complaint:    Chief Complaint   Patient presents with    Establish Care    Hip Pain     R hip. Patient wants a note saying that she can't drive and wants a disability parking pass       History of Present Illness:   Patricia Brown is a 54 y.o. female.  History of Present Illness  The patient presents for evaluation of multiple medical concerns.    The patient expresses a desire for a thyroid evaluation, having consumed only a sip of zero-sugar tea this morning. Her blood glucose level is currently at 92. She is under the care of an endocrinologist for her diabetes, with appointments scheduled every 3 to 4 months, with the next appointment scheduled for 2024.    In her late 20s, the patient experienced frequent popping and cracking sensations in her hip. By the age of 32 or 37, she was informed of a potential need for hip replacement. Despite undergoing x-rays and a CT scan, she was diagnosed with dysplasia. Her mobility fluctuates, with periods of walking being challenging. She experiences pain through her hip when lying down with her legs together, necessitating the use of a pillow between her legs to maintain a straight leg. She describes the pain as being constant and bone-on-bone. She takes 4 Advil pills between 6:30 and 7:00 PM and between 8:30 and 9:00 PM, which allows her to sleep until 2:00 or 3:00 AM. The pain intensifies during ambulation and driving, with no issues with her left leg. She also experiences pain in the tip of her groin, which is less severe on the inside. She attempts to maintain mobility through exercises and leg lifts. She is uncertain if the pain is muscular or bone-on-bone. She is considering an x-ray, CT scan, or MRI. She suspects a pulled ligament. 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 She is requesting a handicap  sticker.    The patient's granuloma annulare is improving. She was previously prescribed triamcinolone cream by her dermatologist, which she was using once a week.  She began using the cream 2 to 3 times a day, which has improved the granuloma's lightening.    The patient experiences occasional cold sores, typically triggered by stress, crying spells, or illness. She requests a refill of famciclovir.    The patient's blood pressure is well-managed with enalapril 5 mg.   She quit smoking in 2005.        Subjective    Review of systems:  Review of Systems     I have reviewed and the following portions of the patient's history were updated as appropriate: past family history, past medical history, past social history, past surgical history and problem list.    Medications:     Current Outpatient Medications:     atorvastatin (LIPITOR) 40 MG tablet, Take 1 tablet by mouth every night at bedtime., Disp: 90 tablet, Rfl: 3    B-D UF III MINI PEN NEEDLES 31G X 5 MM misc, USE 4 TIMES A DAY, Disp: 400 each, Rfl: 3    Blood Glucose Monitoring Suppl (OneTouch Verio Flex System) w/Device kit, USE 1 EACH 4 (FOUR) TIMES A DAY., Disp: 1 kit, Rfl: 1    buPROPion XL (WELLBUTRIN XL) 150 MG 24 hr tablet, Take 1 tablet by mouth Daily., Disp: , Rfl:     Cariprazine HCl 4.5 MG capsule, Take  by mouth Daily., Disp: , Rfl:     clonazePAM (KlonoPIN) 1 MG tablet, , Disp: , Rfl:     colestipol (COLESTID) 1 g tablet, TAKE 1/2-1 TABLET BY MOUTH TWICE DAILY AS NEEDED FOR DIARRHEA, Disp: 60 tablet, Rfl: 0    Continuous Blood Gluc Transmit (Dexcom G6 Transmitter) misc, 1 each by Other route Every 3 (Three) Months., Disp: 1 each, Rfl: 3    Continuous Glucose  (Dexcom G7 ) device, Use 1 each Daily., Disp: 1 each, Rfl: 0    Continuous Glucose Sensor (Dexcom G7 Sensor) misc, Use 1 each Every 10 (Ten) Days., Disp: 9 each, Rfl: 3    Dulaglutide (Trulicity) 4.5 MG/0.5ML solution pen-injector, Inject 0.5 mL under the skin into the  "appropriate area as directed Every 7 (Seven) Days., Disp: 2 mL, Rfl: 3    empagliflozin (Jardiance) 25 MG tablet tablet, Take 1 tablet by mouth Daily., Disp: 90 tablet, Rfl: 1    enalapril (VASOTEC) 5 MG tablet, Take 1 tablet by mouth Daily., Disp: 90 tablet, Rfl: 0    famciclovir (FAMVIR) 500 MG tablet, Take 1 tablet by mouth 3 (Three) Times a Day., Disp: 15 tablet, Rfl: 3    glucose blood (OneTouch Verio) test strip, Use to test blood sugar 4 times daily.  Dx E11.65, Disp: 400 each, Rfl: 3    Insulin Aspart, w/Niacinamide, (Fiasp FlexTouch) 100 UNIT/ML solution pen-injector, 20-24 units with meals plus 2:50>150, MDD 80 units, Disp: 75 mL, Rfl: 1    Insulin Glargine (BASAGLAR KWIKPEN) 100 UNIT/ML injection pen, Inject 55 Units under the skin into the appropriate area as directed Every Night., Disp: 60 mL, Rfl: 1    lithium carbonate 300 MG capsule, Take 1 capsule by mouth 2 (Two) Times a Day With Meals. 2 in am, 3 at night, Disp: , Rfl:     Mounjaro 10 MG/0.5ML solution pen-injector pen, Please see attached for detailed directions, Disp: , Rfl:     OneTouch Delica Lancets 33G misc, Use to test blood sugar 4 times daily.  Dx E11.65, Disp: 400 each, Rfl: 3    topiramate (TOPAMAX) 100 MG tablet, Take 1 tablet by mouth 2 (Two) Times a Day., Disp: , Rfl:     triamcinolone (KENALOG) 0.1 % cream, Apply 1 Application topically to the appropriate area as directed 2 (Two) Times a Day., Disp: 80 g, Rfl: 5    Allergies:   Allergies   Allergen Reactions    Ozempic (0.25 Or 0.5 Mg-Dose) [Semaglutide(0.25 Or 0.5mg-Dos)] Swelling     Tongue swells       Objective   Vital Signs:   Vitals:    07/08/24 1544   BP: 118/84   Pulse: 83   Temp: 98.8 °F (37.1 °C)   TempSrc: Infrared   SpO2: 98%   Weight: 103 kg (226 lb 3.2 oz)   Height: 160 cm (63\")     Body mass index is 40.07 kg/m².          Physical Exam:   Physical Exam  Vitals and nursing note reviewed.   Constitutional:       Appearance: Normal appearance.   HENT:      Head: " Normocephalic and atraumatic.      Right Ear: Tympanic membrane and ear canal normal.      Left Ear: Tympanic membrane and ear canal normal.      Nose: No congestion or rhinorrhea.      Mouth/Throat:      Mouth: Mucous membranes are moist.      Pharynx: Oropharynx is clear. No posterior oropharyngeal erythema.   Cardiovascular:      Rate and Rhythm: Normal rate and regular rhythm.   Pulmonary:      Effort: Pulmonary effort is normal.      Breath sounds: Normal breath sounds. No decreased breath sounds, wheezing, rhonchi or rales.   Musculoskeletal:      Cervical back: Neck supple.      Right hip: Tenderness present.      Left hip: Tenderness present.      Right lower leg: No edema.      Left lower leg: No edema.   Lymphadenopathy:      Cervical: No cervical adenopathy.   Neurological:      Mental Status: She is alert.          Procedures     Assessment / Plan    Assessment/Plan:   Diagnoses and all orders for this visit:    1. Essential hypertension (Primary)  -     T4, free; Future  -     TSH; Future  -     Lipid panel; Future  -     Comprehensive metabolic panel; Future  -     CBC No Differential; Future  -     Vitamin B12; Future    2. Herpes simplex type 1 infection  -     famciclovir (FAMVIR) 500 MG tablet; Take 1 tablet by mouth 3 (Three) Times a Day.  Dispense: 15 tablet; Refill: 3    3. Medication refill    4. Encounter for long-term (current) use of medications  -     Lithium level; Future    5. Granuloma annulare  -     triamcinolone (KENALOG) 0.1 % cream; Apply 1 Application topically to the appropriate area as directed 2 (Two) Times a Day.  Dispense: 80 g; Refill: 5    6. Right hip pain  -     XR Hip With or Without Pelvis 2 - 3 View Right; Future       Assessment & Plan  1. Right hip pain.  An x-ray of the right hip will be conducted today. Laboratory tests will be conducted today, and the results will be awaited.    2. Cold sores.  A refill of famciclovir will be provided.    3.  Hypertension.  Enalapril 5 mg will be prescribed.    Follow-up  A follow-up visit is scheduled for 3 months from now for an annual examination.      Follow Up:   Return in about 3 months (around 10/8/2024) for Annual.    Patient or patient representative verbalized consent for the use of Ambient Listening during the visit with  Josee Branch PA-C for chart documentation. 7/21/2024  14:47 EDT    Josee Branch PA-C   [facility] [place]

## 2024-07-09 ENCOUNTER — TELEPHONE (OUTPATIENT)
Dept: FAMILY MEDICINE CLINIC | Facility: CLINIC | Age: 54
End: 2024-07-09
Payer: COMMERCIAL

## 2024-07-09 LAB
DEPRECATED RDW RBC AUTO: 41.9 FL (ref 37–54)
ERYTHROCYTE [DISTWIDTH] IN BLOOD BY AUTOMATED COUNT: 12.9 % (ref 12.3–15.4)
HCT VFR BLD AUTO: 45.9 % (ref 34–46.6)
HGB BLD-MCNC: 14.7 G/DL (ref 12–15.9)
LITHIUM SERPL-SCNC: 0.4 MMOL/L (ref 0.6–1.2)
MCH RBC QN AUTO: 28.8 PG (ref 26.6–33)
MCHC RBC AUTO-ENTMCNC: 32 G/DL (ref 31.5–35.7)
MCV RBC AUTO: 89.8 FL (ref 79–97)
PLATELET # BLD AUTO: 254 10*3/MM3 (ref 140–450)
PMV BLD AUTO: 11.9 FL (ref 6–12)
RBC # BLD AUTO: 5.11 10*6/MM3 (ref 3.77–5.28)
T4 FREE SERPL-MCNC: 0.93 NG/DL (ref 0.92–1.68)
TSH SERPL DL<=0.05 MIU/L-ACNC: 1.54 UIU/ML (ref 0.27–4.2)
VIT B12 BLD-MCNC: 857 PG/ML (ref 211–946)
WBC NRBC COR # BLD AUTO: 12.16 10*3/MM3 (ref 3.4–10.8)

## 2024-07-09 NOTE — TELEPHONE ENCOUNTER
Name: Patricia Brown      Relationship: Self      Best Callback Number: [phone]      HUB PROVIDED THE RELAY MESSAGE FROM THE OFFICE      PATIENT: VOICED UNDERSTANDING AND HAS NO FURTHER QUESTIONS AT THIS TIME    ADDITIONAL INFORMATION:

## 2024-07-09 NOTE — TELEPHONE ENCOUNTER
HUB TO RELAY    LVM. Let patient know we have not received report yet and will reach out once we receive it.

## 2024-07-10 ENCOUNTER — TELEPHONE (OUTPATIENT)
Dept: FAMILY MEDICINE CLINIC | Facility: CLINIC | Age: 54
End: 2024-07-10

## 2024-07-10 NOTE — TELEPHONE ENCOUNTER
PATIENT IS CALLING TO SEE IF THE XRAY REPORT HAS COME BACK YET     PATIENT CALL BACK [phone] (Mobile)

## 2024-07-15 ENCOUNTER — TELEPHONE (OUTPATIENT)
Dept: FAMILY MEDICINE CLINIC | Facility: CLINIC | Age: 54
End: 2024-07-15

## 2024-07-15 NOTE — TELEPHONE ENCOUNTER
Caller: Patricia Brown    Relationship: Self    Best call back number: [phone]     What is the best time to reach you: ANYTIME    Who are you requesting to speak with (clinical staff, provider,  specific staff member): PROVDER    Do you know the name of the person who called: NA    What was the call regarding: PATIENT WOULD LIKE A CALL BACK TO DISCUSS IF SHE COULD GET PROVIDER NOTES AND SEE IF SHE QUALIFIES FOR A HANDICAP PASS.    Is it okay if the provider responds through MyChart: NO

## 2024-07-25 ENCOUNTER — TELEPHONE (OUTPATIENT)
Dept: ENDOCRINOLOGY | Facility: CLINIC | Age: 54
End: 2024-07-25
Payer: COMMERCIAL

## 2024-07-25 DIAGNOSIS — E11.65 TYPE 2 DIABETES MELLITUS WITH HYPERGLYCEMIA, WITH LONG-TERM CURRENT USE OF INSULIN: ICD-10-CM

## 2024-07-25 DIAGNOSIS — E11.65 TYPE 2 DIABETES MELLITUS WITH HYPERGLYCEMIA, WITH LONG-TERM CURRENT USE OF INSULIN: Primary | ICD-10-CM

## 2024-07-25 DIAGNOSIS — Z79.4 TYPE 2 DIABETES MELLITUS WITH HYPERGLYCEMIA, WITH LONG-TERM CURRENT USE OF INSULIN: Primary | ICD-10-CM

## 2024-07-25 DIAGNOSIS — Z79.4 TYPE 2 DIABETES MELLITUS WITH HYPERGLYCEMIA, WITH LONG-TERM CURRENT USE OF INSULIN: ICD-10-CM

## 2024-07-25 RX ORDER — DULAGLUTIDE 4.5 MG/.5ML
4.5 INJECTION, SOLUTION SUBCUTANEOUS
Qty: 2 ML | Refills: 3 | Status: SHIPPED | OUTPATIENT
Start: 2024-07-25

## 2024-07-25 RX ORDER — INSULIN ASPART 100 [IU]/ML
INJECTION, SOLUTION INTRAVENOUS; SUBCUTANEOUS
Qty: 90 ML | Refills: 1 | Status: SHIPPED | OUTPATIENT
Start: 2024-07-25

## 2024-07-25 RX ORDER — INSULIN LISPRO-AABC 100 [IU]/ML
INJECTION, SOLUTION SUBCUTANEOUS
Qty: 90 ML | Refills: 1 | Status: SHIPPED | OUTPATIENT
Start: 2024-07-25

## 2024-07-25 NOTE — TELEPHONE ENCOUNTER
[facility] CALLED STATING THAT LYUMJEV IS NOT COVERED AND IS REQUESTING TO HAVE AN ALTERNATIVE SENT IT.

## 2024-07-25 NOTE — TELEPHONE ENCOUNTER
[facility] called stating that Mounjaro is on back order at this time and would like to know if provider would like to provide an alternative.

## 2024-07-25 NOTE — TELEPHONE ENCOUNTER
PATIENT WOULD LIKE A CALL BACK TO DISCUSS THE FIASP PRESCRIPTION.       PLEASE CALL HER [phone]

## 2024-08-27 DIAGNOSIS — Z79.4 TYPE 2 DIABETES MELLITUS WITH HYPERGLYCEMIA, WITH LONG-TERM CURRENT USE OF INSULIN: ICD-10-CM

## 2024-08-27 DIAGNOSIS — E11.65 TYPE 2 DIABETES MELLITUS WITH HYPERGLYCEMIA, WITH LONG-TERM CURRENT USE OF INSULIN: ICD-10-CM

## 2024-08-28 DIAGNOSIS — I10 ESSENTIAL HYPERTENSION: Chronic | ICD-10-CM

## 2024-08-28 DIAGNOSIS — E78.2 MIXED HYPERLIPIDEMIA: ICD-10-CM

## 2024-08-28 DIAGNOSIS — Z76.0 MEDICATION REFILL: ICD-10-CM

## 2024-08-28 RX ORDER — ENALAPRIL MALEATE 5 MG/1
5 TABLET ORAL DAILY
Qty: 90 TABLET | Refills: 0 | Status: SHIPPED | OUTPATIENT
Start: 2024-08-28

## 2024-08-28 RX ORDER — BLOOD-GLUCOSE METER
1 EACH MISCELLANEOUS 4 TIMES DAILY
Qty: 1 KIT | Refills: 1 | OUTPATIENT
Start: 2024-08-28

## 2024-08-28 RX ORDER — ATORVASTATIN CALCIUM 40 MG/1
40 TABLET, FILM COATED ORAL
Qty: 90 TABLET | Refills: 2 | Status: SHIPPED | OUTPATIENT
Start: 2024-08-28

## 2024-08-28 NOTE — TELEPHONE ENCOUNTER
Caller: JohnsjPatricia tavares    Relationship: Self    Best call back number: [phone]     Requested Prescriptions:   Requested Prescriptions     Pending Prescriptions Disp Refills    enalapril (VASOTEC) 5 MG tablet 90 tablet 0     Sig: Take 1 tablet by mouth Daily.      90 DAY SUPPLY      Pharmacy where request should be sent: [facility] [address] [place] [address] [phone] [facility] [phone] FX     Last office visit with prescribing clinician: 7/8/2024   Last telemedicine visit with prescribing clinician: Visit date not found   Next office visit with prescribing clinician: 10/22/2024     Additional details provided by patient: PATIENT HAS CHANGED TO THE ABOVE PHARMACY    Does the patient have less than a 3 day supply:  [] Yes  [x] No    Would you like a call back once the refill request has been completed: [] Yes [x] No    If the office needs to give you a call back, can they leave a voicemail: [] Yes [x] No    Gino Bull Rep   08/28/24 10:34 EDT

## 2024-08-29 DIAGNOSIS — Z76.0 MEDICATION REFILL: ICD-10-CM

## 2024-08-29 DIAGNOSIS — I10 ESSENTIAL HYPERTENSION: Chronic | ICD-10-CM

## 2024-08-29 RX ORDER — ENALAPRIL MALEATE 5 MG/1
5 TABLET ORAL DAILY
Qty: 90 TABLET | Refills: 0 | Status: CANCELLED | OUTPATIENT
Start: 2024-08-29

## 2024-09-06 ENCOUNTER — TELEPHONE (OUTPATIENT)
Dept: FAMILY MEDICINE CLINIC | Facility: CLINIC | Age: 54
End: 2024-09-06
Payer: COMMERCIAL

## 2024-09-06 NOTE — TELEPHONE ENCOUNTER
Pharmacy Name: [facility] [address] [phone] [facility] [phone]      Pharmacy representative name: GET    Pharmacy representative phone number: [phone]     What medication are you calling in regards to: enalapril (VASOTEC) 5 MG tablet     What question does the pharmacy have: SHE IS ALSO ON lithium carbonate 300 MG capsule AND THERE IS A DRUG INTERACTION.  DO YOU STILL WHAT THIS MEDICATION FILLED. PLEASE CALL AND ADVISE    Who is the provider that prescribed the medication: STEPH PEDERSON PA-C

## 2024-09-10 DIAGNOSIS — Z76.0 MEDICATION REFILL: ICD-10-CM

## 2024-09-10 DIAGNOSIS — I10 ESSENTIAL HYPERTENSION: Chronic | ICD-10-CM

## 2024-09-10 RX ORDER — ENALAPRIL MALEATE 5 MG/1
5 TABLET ORAL DAILY
Qty: 90 TABLET | Refills: 0 | Status: SHIPPED | OUTPATIENT
Start: 2024-09-10

## 2024-09-18 RX ORDER — TIRZEPATIDE 10 MG/.5ML
10 INJECTION, SOLUTION SUBCUTANEOUS WEEKLY
Qty: 2 ML | Refills: 3 | Status: SHIPPED | OUTPATIENT
Start: 2024-09-18

## 2024-09-24 DIAGNOSIS — Z79.4 TYPE 2 DIABETES MELLITUS WITH HYPERGLYCEMIA, WITH LONG-TERM CURRENT USE OF INSULIN: ICD-10-CM

## 2024-09-24 DIAGNOSIS — E11.65 TYPE 2 DIABETES MELLITUS WITH HYPERGLYCEMIA, WITH LONG-TERM CURRENT USE OF INSULIN: ICD-10-CM

## 2024-09-24 RX ORDER — ACYCLOVIR 400 MG/1
1 TABLET ORAL
Qty: 9 EACH | Refills: 3 | Status: SHIPPED | OUTPATIENT
Start: 2024-09-24

## 2024-10-22 ENCOUNTER — OFFICE VISIT (OUTPATIENT)
Dept: FAMILY MEDICINE CLINIC | Facility: CLINIC | Age: 54
End: 2024-10-22
Payer: COMMERCIAL

## 2024-10-22 VITALS
HEART RATE: 76 BPM | TEMPERATURE: 98.7 F | BODY MASS INDEX: 38.59 KG/M2 | SYSTOLIC BLOOD PRESSURE: 130 MMHG | DIASTOLIC BLOOD PRESSURE: 76 MMHG | WEIGHT: 217.8 LBS | HEIGHT: 63 IN | OXYGEN SATURATION: 95 %

## 2024-10-22 DIAGNOSIS — Z00.00 ANNUAL PHYSICAL EXAM: Primary | ICD-10-CM

## 2024-10-22 DIAGNOSIS — Z12.31 ENCOUNTER FOR SCREENING MAMMOGRAM FOR MALIGNANT NEOPLASM OF BREAST: ICD-10-CM

## 2024-10-22 DIAGNOSIS — I10 ESSENTIAL HYPERTENSION: ICD-10-CM

## 2024-10-22 DIAGNOSIS — S80.211A ABRASION, RIGHT KNEE, INITIAL ENCOUNTER: ICD-10-CM

## 2024-10-22 DIAGNOSIS — Z23 IMMUNIZATION DUE: ICD-10-CM

## 2024-10-22 DIAGNOSIS — E78.2 MIXED HYPERLIPIDEMIA: ICD-10-CM

## 2024-10-22 PROCEDURE — 90472 IMMUNIZATION ADMIN EACH ADD: CPT | Performed by: PHYSICIAN ASSISTANT

## 2024-10-22 PROCEDURE — 90656 IIV3 VACC NO PRSV 0.5 ML IM: CPT | Performed by: PHYSICIAN ASSISTANT

## 2024-10-22 PROCEDURE — 99396 PREV VISIT EST AGE 40-64: CPT | Performed by: PHYSICIAN ASSISTANT

## 2024-10-22 PROCEDURE — 90471 IMMUNIZATION ADMIN: CPT | Performed by: PHYSICIAN ASSISTANT

## 2024-10-22 PROCEDURE — 90677 PCV20 VACCINE IM: CPT | Performed by: PHYSICIAN ASSISTANT

## 2024-10-22 NOTE — LETTER
[facility]  Vaccine Consent Form    Patient Name:  Patricia Brown  Patient :  1970     Vaccine(s) Ordered    Pneumococcal Conjugate Vaccine 20-Valent All  Fluzone >6mos        Screening Checklist  The following questions should be completed prior to vaccination. If you answer “yes” to any question, it does not necessarily mean you should not be vaccinated. It just means we may need to clarify or ask more questions. If a question is unclear, please ask your healthcare provider to explain it.    Yes No   Any fever or moderate to severe illness today (mild illness and/or antibiotic treatment are not contraindications)?     Do you have a history of a serious reaction to any previous vaccinations, such as anaphylaxis, encephalopathy within 7 days, Guillain-Chittenango syndrome within 6 weeks, seizure?     Have you received any live vaccine(s) (e.g MMR, LENIN) or any other vaccines in the last month (to ensure duplicate doses aren't given)?     Do you have an anaphylactic allergy to latex (DTaP, DTaP-IPV, Hep A, Hep B, MenB, RV, Td, Tdap), baker’s yeast (Hep B, HPV), polysorbates (RSV, nirsevimab, PCV 20, Rotavirrus, Tdap, Shingrix), or gelatin (LENIN, MMR)?     Do you have an anaphylactic allergy to neomycin (Rabies, LENIN, MMR, IPV, Hep A), polymyxin B (IPV), or streptomycin (IPV)?      Any cancer, leukemia, AIDS, or other immune system disorder? (LENIN, MMR, RV)     Do you have a parent, brother, or sister with an immune system problem (if immune competence of vaccine recipient clinically verified, can proceed)? (MMR, LENIN)     Any recent steroid treatments for >2 weeks, chemotherapy, or radiation treatment? (LENIN, MMR)     Have you received antibody-containing blood transfusions or IVIG in the past 11 months (recommended interval is dependent on product)? (MMR, LENIN)     Have you taken antiviral drugs (acyclovir, famciclovir, valacyclovir for LENIN) in the last 24 or 48 hours, respectively?      Are you pregnant or  "planning to become pregnant within 1 month? (LENIN, MMR, HPV, IPV, MenB, Abrexvy; For Hep B- refer to Engerix-B; For RSV - Abrysvo is indicated for 32-36 weeks of pregnancy from September to January)     For infants, have you ever been told your child has had intussusception or a medical emergency involving obstruction of the intestine (Rotavirus)? If not for an infant, can skip this question.         *Ordering Physicians/APC should be consulted if \"yes\" is checked by the patient or guardian above.  I have received, read, and understand the Vaccine Information Statement (VIS) for each vaccine ordered.  I have considered my or my child's health status as well as the health status of my close contacts.  I have taken the opportunity to discuss my vaccine questions with my or my child's health care provider.   I have requested that the ordered vaccine(s) be given to me or my child.  I understand the benefits and risks of the vaccines.  I understand that I should remain in the clinic for 15 minutes after receiving the vaccine(s).  _________________________________________________________  Signature of Patient or Parent/Legal Guardian ____________________  Date     "

## 2024-10-22 NOTE — PROGRESS NOTES
Female Physical Note      Date: 10/22/2024   Patient Name: Patricia Brown  : 1970   MRN: [id number]     Chief Complaint:    Chief Complaint   Patient presents with    Annual Exam       History of Present Illness: Patricia Brown is a 54 y.o. female who is here today for their annual health maintenance and physical.   History of Present Illness  The patient is a 54-year-old female who comes in today for her annual physical.    She has experienced two falls, resulting in a scraped knee that remains tender. The first fall occurred in a parking lot, where she landed on her leg, causing significant bleeding. The second fall happened at home. She reports knee pain but no leg swelling. She also experiences back pain and is unsure if chiropractic treatment is covered by her insurance.    She has been using a treadmill with a handrail for exercise and has lost 20 pounds, reducing her weight from 240 to 220 pounds. She is currently on Mounjaro 10 mg and anticipates an increase in dosage next week when she sees her endocrinologist.    She missed her mammogram appointment in 2024 and is requesting a referral for a new appointment. She also wishes to receive the influenza and pneumonia vaccines. She had an eye exam last week and was found to be free of retinopathy.    She reports no bowel issues or constipation but does experience frequent urination.    She had a full panel of labs done in 2024.      Subjective      Review of Systems:   Review of Systems    Past Medical History, Social History, Family History and Care Team were all reviewed with patient and updated as appropriate.     Medications:     Current Outpatient Medications:     atorvastatin (LIPITOR) 40 MG tablet, Take 1 tablet by mouth every night at bedtime., Disp: 90 tablet, Rfl: 2    B-D UF III MINI PEN NEEDLES 31G X 5 MM misc, USE 4 TIMES A DAY, Disp: 400 each, Rfl: 3    Blood Glucose Monitoring Suppl (OneTouch Verio Flex  System) w/Device kit, USE 1 EACH 4 (FOUR) TIMES A DAY., Disp: 1 kit, Rfl: 1    buPROPion XL (WELLBUTRIN XL) 150 MG 24 hr tablet, Take 1 tablet by mouth Daily., Disp: , Rfl:     Cariprazine HCl 4.5 MG capsule, Take  by mouth Daily., Disp: , Rfl:     clonazePAM (KlonoPIN) 1 MG tablet, , Disp: , Rfl:     colestipol (COLESTID) 1 g tablet, TAKE 1/2-1 TABLET BY MOUTH TWICE DAILY AS NEEDED FOR DIARRHEA, Disp: 60 tablet, Rfl: 0    Continuous Blood Gluc Transmit (Dexcom G6 Transmitter) misc, 1 each by Other route Every 3 (Three) Months., Disp: 1 each, Rfl: 3    Continuous Glucose  (Dexcom G7 ) device, Use 1 each Daily., Disp: 1 each, Rfl: 0    Continuous Glucose Sensor (Dexcom G7 Sensor) misc, Use 1 each Every 10 (Ten) Days., Disp: 9 each, Rfl: 3    empagliflozin (Jardiance) 25 MG tablet tablet, Take 1 tablet by mouth Daily., Disp: 90 tablet, Rfl: 1    enalapril (VASOTEC) 5 MG tablet, TAKE 1 TABLET BY MOUTH EVERY DAY, Disp: 90 tablet, Rfl: 0    famciclovir (FAMVIR) 500 MG tablet, Take 1 tablet by mouth 3 (Three) Times a Day., Disp: 15 tablet, Rfl: 3    glucose blood (OneTouch Verio) test strip, Use to test blood sugar 4 times daily.  Dx E11.65, Disp: 400 each, Rfl: 3    insulin aspart (NovoLOG FlexPen) 100 UNIT/ML solution pen-injector sc pen, Inject 20-24 units with meals plus 2:50>150, MDD 90 units in place of lyumjev, Disp: 90 mL, Rfl: 1    Insulin Aspart, w/Niacinamide, (Fiasp FlexTouch) 100 UNIT/ML solution pen-injector, 20-24 units with meals plus 2:50>150, MDD 80 units, Disp: 75 mL, Rfl: 1    Insulin Glargine (BASAGLAR KWIKPEN) 100 UNIT/ML injection pen, Inject 55 Units under the skin into the appropriate area as directed Every Night., Disp: 60 mL, Rfl: 1    Insulin Lispro-aabc, 1 U Dial, (Lyumjev KwikPen) 100 UNIT/ML solution pen-injector, 20-24 units with meals plus 2:50>150, MDD 90 units, Disp: 90 mL, Rfl: 1    lithium carbonate 300 MG capsule, Take 1 capsule by mouth 2 (Two) Times a Day With Meals.  "2 in am, 3 at night, Disp: , Rfl:     Mounjaro 10 MG/0.5ML solution pen-injector pen, Inject 0.5 mL under the skin into the appropriate area as directed 1 (One) Time Per Week. Please see attached for detailed directions, Disp: 2 mL, Rfl: 3    OneTouch Delica Lancets 33G misc, Use to test blood sugar 4 times daily.  Dx E11.65, Disp: 400 each, Rfl: 3    topiramate (TOPAMAX) 100 MG tablet, Take 1 tablet by mouth 2 (Two) Times a Day., Disp: , Rfl:     triamcinolone (KENALOG) 0.1 % cream, Apply 1 Application topically to the appropriate area as directed 2 (Two) Times a Day., Disp: 80 g, Rfl: 5    Allergies:   Allergies   Allergen Reactions    Ozempic (0.25 Or 0.5 Mg-Dose) [Semaglutide(0.25 Or 0.5mg-Dos)] Swelling     Tongue swells       PHQ-9 Depression Screening  Little interest or pleasure in doing things?     Feeling down, depressed, or hopeless?     PHQ-2 Total Score     Trouble falling or staying asleep, or sleeping too much?     Feeling tired or having little energy?     Poor appetite or overeating?     Feeling bad about yourself - or that you are a failure or have let yourself or your family down?     Trouble concentrating on things, such as reading the newspaper or watching television?     Moving or speaking so slowly that other people could have noticed? Or the opposite - being so fidgety or restless that you have been moving around a lot more than usual?     Thoughts that you would be better off dead, or of hurting yourself in some way?     PHQ-9 Total Score     If you checked off any problems, how difficult have these problems made it for you to do your work, take care of things at home, or get along with other people?           Objective     Vital Signs:   Vitals:    10/22/24 0832   BP: 130/76   Pulse: 76   Temp: 98.7 °F (37.1 °C)   TempSrc: Infrared   SpO2: 95%   Weight: 98.8 kg (217 lb 12.8 oz)   Height: 160 cm (63\")     Body mass index is 38.58 kg/m².          Physical Exam:   Physical Exam  Vitals and " nursing note reviewed.   Constitutional:       General: She is not in acute distress.     Appearance: Normal appearance. She is well-developed.   HENT:      Head: Normocephalic and atraumatic.      Right Ear: Tympanic membrane and ear canal normal. There is no impacted cerumen.      Left Ear: Tympanic membrane and ear canal normal. There is no impacted cerumen.      Nose: Nose normal. No congestion or rhinorrhea.      Mouth/Throat:      Mouth: Mucous membranes are moist.      Pharynx: Oropharynx is clear. No oropharyngeal exudate or posterior oropharyngeal erythema.   Eyes:      General: No scleral icterus.        Right eye: No discharge.         Left eye: No discharge.      Extraocular Movements: Extraocular movements intact.      Conjunctiva/sclera: Conjunctivae normal.      Pupils: Pupils are equal, round, and reactive to light.   Neck:      Thyroid: No thyromegaly.      Vascular: No carotid bruit.   Cardiovascular:      Rate and Rhythm: Normal rate and regular rhythm.      Heart sounds: Normal heart sounds. No murmur heard.  Pulmonary:      Breath sounds: Normal breath sounds. No wheezing, rhonchi or rales.   Abdominal:      General: Bowel sounds are normal. There is no distension.      Palpations: Abdomen is soft. There is no mass.      Tenderness: There is no abdominal tenderness.   Musculoskeletal:         General: No swelling. Normal range of motion.      Cervical back: Normal range of motion and neck supple.      Right lower leg: No edema.      Left lower leg: No edema.   Lymphadenopathy:      Cervical: No cervical adenopathy.   Skin:     General: Skin is warm.      Coloration: Skin is not jaundiced or pale.      Findings: No bruising or rash.   Neurological:      General: No focal deficit present.      Mental Status: She is alert.      Cranial Nerves: No cranial nerve deficit.      Motor: No weakness.      Gait: Gait normal.   Psychiatric:         Mood and Affect: Mood normal.         Behavior: Behavior  normal.         Judgment: Judgment normal.          Procedures    Assessment / Plan      Assessment/Plan:   Diagnoses and all orders for this visit:    1. Annual physical exam (Primary)    2. Encounter for screening mammogram for malignant neoplasm of breast  -     Mammo Screening Digital Tomosynthesis Bilateral With CAD; Future    3. Essential hypertension    4. Mixed hyperlipidemia    5. Abrasion, right knee, initial encounter    6. Immunization due  -     Pneumococcal Conjugate Vaccine 20-Valent All  -     Fluzone >6mos       Assessment & Plan  1. Knee pain.  The patient reports falling twice, resulting in knee pain and tenderness. She fell once in a parking lot and once at home. The knee is sore but no swelling is noted. She is advised to monitor the pain and use supportive measures as needed.    2. Diabetes Mellitus.  The patient is on Mounjaro 10.0 mg and expects a dosage increase at her endocrinology appointment next week. She is advised to continue her current regimen and discuss dosage adjustments with her endocrinologist.    3. Weight loss.  The patient has lost approximately 20 pounds since her last visit. She is encouraged to continue her efforts with the treadmill and maintain her current exercise routine.    4. Back pain.  The patient reports chronic back pain and inquires about chiropractic care. She is advised to explore local options for chiropractic services and consider other supportive measures for pain management.    5. Health Maintenance.  A mammogram has been ordered and will be scheduled. Influenza and pneumonia vaccines will be administered today. The patient has completed an eye exam recently with no signs of retinopathy.    6. BP good today, continue current medication    Follow-up  Return in 6 months for follow-up.    Follow Up:   Return in about 6 months (around 4/22/2025) for Recheck HTN.    Healthcare Maintenance:   Counseling provided on health maintenance. Colon cancer screening  declined today. .   Patricia Brown voices understanding and acceptance of this advice and will call back with any further questions or concerns. AVS with preventive healthcare tips printed for patient.     Patient or patient representative verbalized consent for the use of Ambient Listening during the visit with  Josee Branch PA-C for chart documentation. 10/22/2024  09:12 EDT    Josee Branch PA-C   [facility]

## 2024-10-26 DIAGNOSIS — Z79.4 TYPE 2 DIABETES MELLITUS WITH HYPERGLYCEMIA, WITH LONG-TERM CURRENT USE OF INSULIN: ICD-10-CM

## 2024-10-26 DIAGNOSIS — E11.65 TYPE 2 DIABETES MELLITUS WITH HYPERGLYCEMIA, WITH LONG-TERM CURRENT USE OF INSULIN: ICD-10-CM

## 2024-10-29 ENCOUNTER — OFFICE VISIT (OUTPATIENT)
Dept: ENDOCRINOLOGY | Facility: CLINIC | Age: 54
End: 2024-10-29
Payer: COMMERCIAL

## 2024-10-29 VITALS
DIASTOLIC BLOOD PRESSURE: 80 MMHG | HEIGHT: 63 IN | WEIGHT: 217 LBS | SYSTOLIC BLOOD PRESSURE: 128 MMHG | HEART RATE: 96 BPM | BODY MASS INDEX: 38.45 KG/M2 | OXYGEN SATURATION: 93 %

## 2024-10-29 DIAGNOSIS — E11.65 TYPE 2 DIABETES MELLITUS WITH HYPERGLYCEMIA, WITH LONG-TERM CURRENT USE OF INSULIN: Primary | ICD-10-CM

## 2024-10-29 DIAGNOSIS — Z79.4 TYPE 2 DIABETES MELLITUS WITH HYPERGLYCEMIA, WITH LONG-TERM CURRENT USE OF INSULIN: Primary | ICD-10-CM

## 2024-10-29 LAB
ALBUMIN UR-MCNC: <1.2 MG/DL
CREAT UR-MCNC: 53.5 MG/DL
EXPIRATION DATE: ABNORMAL
EXPIRATION DATE: ABNORMAL
GLUCOSE BLDC GLUCOMTR-MCNC: 199 MG/DL (ref 70–130)
HBA1C MFR BLD: 6.5 % (ref 4.5–5.7)
Lab: ABNORMAL
Lab: ABNORMAL
MICROALBUMIN/CREAT UR: NORMAL MG/G{CREAT}

## 2024-10-29 PROCEDURE — 82043 UR ALBUMIN QUANTITATIVE: CPT | Performed by: INTERNAL MEDICINE

## 2024-10-29 PROCEDURE — 82570 ASSAY OF URINE CREATININE: CPT | Performed by: INTERNAL MEDICINE

## 2024-10-29 RX ORDER — INSULIN GLARGINE 100 [IU]/ML
INJECTION, SOLUTION SUBCUTANEOUS
Refills: 1 | OUTPATIENT
Start: 2024-10-29

## 2024-10-29 NOTE — PROGRESS NOTES
Chief Complaint   Patient presents with    Diabetes     Type II    Hyperlipidemia    Hypertension          HPI   Patricia Brown is a 54 y.o. female had concerns including Diabetes (Type II), Hyperlipidemia, and Hypertension.    She is checking blood sugars 4+ times per day with dexcom CGM. Data reviewed from the last two weeks shows average glucose 150 with variability of 21%. In target range 83%, high 17%, very high 0%, low 0%, very low 0%.   Pattern of: Postprandial hyperglycemia    Current medications for diabetes include Mounjaro 10 mg weekly, Jardiance 25 mg daily, no longer taking/needs basaglar, and takes NovoLog 24 units 1-2 times daily for higher carb meals.    She is on atorvastatin 40 mg daily.    Was hoping for more weight loss. Has lost 19 lb since April.   Is walking on walking pad. Controlling portions.     The following portions of the patient's history were reviewed and updated as appropriate: allergies, current medications, past family history, past medical history, past social history, past surgical history, and problem list.      Review of Systems   Constitutional:  Positive for appetite change.   Endocrine: Negative.         Physical Exam  Vitals reviewed.   Constitutional:       Appearance: Normal appearance. She is obese.      Comments: Body mass index is 38.45 kg/m².   Cardiovascular:      Rate and Rhythm: Normal rate.      Pulses:           Dorsalis pedis pulses are 2+ on the right side and 2+ on the left side.   Pulmonary:      Effort: Pulmonary effort is normal.   Feet:      Right foot:      Skin integrity: Callus and dry skin present.      Toenail Condition: Right toenails are normal.      Left foot:      Skin integrity: Callus and dry skin present.      Toenail Condition: Left toenails are normal.      Comments: Diabetic Foot Exam Performed and Monofilament Test Performed      Monofilament 5/5 bilaterally    Neurological:      General: No focal deficit present.      Mental Status:  "She is alert. Mental status is at baseline.   Psychiatric:         Mood and Affect: Mood normal.         Behavior: Behavior normal.        /80 (BP Location: Left arm, Patient Position: Sitting, Cuff Size: Adult)   Pulse 96   Ht 160 cm (62.99\")   Wt 98.4 kg (217 lb)   LMP 10/14/2024 (Exact Date)   SpO2 93%   BMI 38.45 kg/m²      Labs and imaging    CMP:  Lab Results   Component Value Date    BUN 14 07/08/2024    CREATININE 1.06 (H) 07/08/2024    EGFR 63.3 07/08/2024    BCR 13.2 07/08/2024     07/08/2024    K 4.0 07/08/2024    CO2 21.5 (L) 07/08/2024    CALCIUM 9.3 07/08/2024    ALBUMIN 4.5 07/08/2024    BILITOT 0.4 07/08/2024    ALKPHOS 95 07/08/2024    AST 29 07/08/2024    ALT 17 07/08/2024     Lipid Panel:  Lab Results   Component Value Date    CHOL 147 07/08/2024    TRIG 143 07/08/2024    HDL 60 07/08/2024    VLDL 24 07/08/2024    LDL 63 07/08/2024     HbA1c:  Lab Results   Component Value Date    HGBA1C 6.5 (A) 10/29/2024    HGBA1C 7.5 (A) 04/29/2024     Glucose:  Lab Results   Component Value Date    POCGLU 199 (A) 10/29/2024     Microalbumin:  Lab Results   Component Value Date    MALBCRERATIO  11/29/2023      Comment:      Unable to calculate     TSH:  Lab Results   Component Value Date    TSH 1.540 07/08/2024       Assessment and plan  Diagnoses and all orders for this visit:    1. Type 2 diabetes mellitus with hyperglycemia, with long-term current use of insulin (Primary)  Uncontrolled with occasional hyperglycemia but A1c has improved to 6.5.  No complications from diabetes.  No metformin due to history of GI intolerance.  Increase Mounjaro to 12.5 mg weekly.  Titrate up to max dose in a month if tolerated.  Basaglar is no longer required.  Excellent!  Continue NovoLog 24 units before higher carb meals as needed.  Continue Jardiance 25 mg daily.  Check urine microalbumin today.  Labs are up-to-date from July.  Ophtho exam is up-to-date from 2024.  No retinopathy.  Monofilament was updated " today.  -     POC Glucose, Blood  -     POC Glycosylated Hemoglobin (Hb A1C)  -     Tirzepatide 12.5 MG/0.5ML solution auto-injector; Inject 12.5 mg under the skin into the appropriate area as directed Every 7 (Seven) Days.  Dispense: 2 mL; Refill: 1  -     empagliflozin (Jardiance) 25 MG tablet tablet; Take 1 tablet by mouth Daily.  Dispense: 90 tablet; Refill: 1  -     Microalbumin / Creatinine Urine Ratio - Urine, Clean Catch         Return in about 4 months (around 2/28/2025) for next scheduled follow up. The patient was instructed to contact the clinic with any interval questions or concerns.    Electronically signed by: Callie Ramirez DO   Endocrinologist    Please note that portions of this note were completed with a voice recognition program.

## 2024-12-03 DIAGNOSIS — I10 ESSENTIAL HYPERTENSION: Chronic | ICD-10-CM

## 2024-12-03 DIAGNOSIS — Z76.0 MEDICATION REFILL: ICD-10-CM

## 2024-12-03 RX ORDER — ENALAPRIL MALEATE 5 MG/1
5 TABLET ORAL DAILY
Qty: 90 TABLET | Refills: 0 | Status: SHIPPED | OUTPATIENT
Start: 2024-12-03

## 2024-12-03 NOTE — TELEPHONE ENCOUNTER
Caller: Kevin Patricia Valentina    Relationship: Self    Best call back number: [phone]     Requested Prescriptions:   Requested Prescriptions     Pending Prescriptions Disp Refills    enalapril (VASOTEC) 5 MG tablet [Pharmacy Med Name: Enalapril Maleate 5 MG Oral Tablet] 90 tablet 0     Sig: Take 1 tablet by mouth once daily        Pharmacy where request should be sent: [facility] [address] [place] [address] [phone] [facility] [phone] FX     Last office visit with prescribing clinician: 10/22/2024   Last telemedicine visit with prescribing clinician: Visit date not found   Next office visit with prescribing clinician: 4/22/2025     Additional details provided by patient:  [place] AND NEEDS A REFILL, WILL RUN ON SATURDAY.  ASKING FOR 90 DAYS     Does the patient have less than a 3 day supply:  [x] Yes  [] No    Would you like a call back once the refill request has been completed: [] Yes [] No    If the office needs to give you a call back, can they leave a voicemail: [] Yes [] No    Gino Solo Rep   12/03/24 09:45 EST

## 2024-12-11 ENCOUNTER — PRIOR AUTHORIZATION (OUTPATIENT)
Dept: ENDOCRINOLOGY | Facility: CLINIC | Age: 54
End: 2024-12-11
Payer: COMMERCIAL

## 2024-12-11 NOTE — TELEPHONE ENCOUNTER
BRIAN HERNANDEZ (Rendon: [id number])  PA Case ID #: [id number]  Rx #: [id number]  Need Help? Call us at [phone]  Outcome  Approved today by Alex SMITHP 2017  Your PA request has been approved. Additional information will be provided in the approval communication. (Message 1143)  Authorization Expiration Date: 12/11/2025  Drug  Dexcom G7 Sensor  ePA cloud logo  Form  Alex Electronic PA Form (2017 NCPDP)  
Yes

## 2024-12-17 ENCOUNTER — PRIOR AUTHORIZATION (OUTPATIENT)
Dept: ENDOCRINOLOGY | Facility: CLINIC | Age: 54
End: 2024-12-17
Payer: COMMERCIAL

## 2024-12-17 ENCOUNTER — TELEPHONE (OUTPATIENT)
Dept: ENDOCRINOLOGY | Facility: CLINIC | Age: 54
End: 2024-12-17
Payer: COMMERCIAL

## 2024-12-17 DIAGNOSIS — E11.65 TYPE 2 DIABETES MELLITUS WITH HYPERGLYCEMIA, WITH LONG-TERM CURRENT USE OF INSULIN: Primary | ICD-10-CM

## 2024-12-17 DIAGNOSIS — Z79.4 TYPE 2 DIABETES MELLITUS WITH HYPERGLYCEMIA, WITH LONG-TERM CURRENT USE OF INSULIN: Primary | ICD-10-CM

## 2024-12-17 DIAGNOSIS — E11.65 TYPE 2 DIABETES MELLITUS WITH HYPERGLYCEMIA, WITH LONG-TERM CURRENT USE OF INSULIN: ICD-10-CM

## 2024-12-17 DIAGNOSIS — Z79.4 TYPE 2 DIABETES MELLITUS WITH HYPERGLYCEMIA, WITH LONG-TERM CURRENT USE OF INSULIN: ICD-10-CM

## 2024-12-17 RX ORDER — TIRZEPATIDE 15 MG/.5ML
15 INJECTION, SOLUTION SUBCUTANEOUS
Qty: 9 ML | Refills: 1 | Status: SHIPPED | OUTPATIENT
Start: 2024-12-17

## 2024-12-17 RX ORDER — TIRZEPATIDE 15 MG/.5ML
15 INJECTION, SOLUTION SUBCUTANEOUS
Qty: 9 ML | Refills: 1 | Status: SHIPPED | OUTPATIENT
Start: 2024-12-17 | End: 2024-12-17 | Stop reason: SDUPTHER

## 2024-12-17 NOTE — TELEPHONE ENCOUNTER
BRIAN HERNANDEZ (Key: [id number])  PA Case ID #: [id number]  Rx #: [id number]  Need Help? Call us at [phone]  Outcome  Approved today by Alex [facility] 2017  Your PA request has been approved. Additional information will be provided in the approval communication. (Message 1149)  Authorization Expiration Date: 12/16/2025  Drug  Mounjaro 15MG/0.5ML auto-injectors  ePA cloud logo  Form  Alex Electronic PA Form (2017 NCPDP)

## 2024-12-17 NOTE — TELEPHONE ENCOUNTER
PATIENTS PRESCRIPTION FOR  Tirzepatide (Mounjaro) 15 MG/0.5ML solution auto-injector WAS SENT TO [facility] AND PATIENT WOULD LIKE IT SENT TO [facility] IN [place]. SHE CANNOT WAIT ON MAIL ORDER.     [facility] - [place], [place] - [address] [phone] [facility] [phone] FX      PLEASE ADVISE

## 2025-02-03 ENCOUNTER — HOSPITAL ENCOUNTER (OUTPATIENT)
Dept: MAMMOGRAPHY | Facility: HOSPITAL | Age: 55
Discharge: HOME | End: 2025-02-03
Admitting: PHYSICIAN ASSISTANT
Payer: COMMERCIAL

## 2025-02-03 DIAGNOSIS — Z12.31 ENCOUNTER FOR SCREENING MAMMOGRAM FOR MALIGNANT NEOPLASM OF BREAST: ICD-10-CM

## 2025-02-03 PROCEDURE — 77063 BREAST TOMOSYNTHESIS BI: CPT

## 2025-02-03 PROCEDURE — 77067 SCR MAMMO BI INCL CAD: CPT

## 2025-02-05 PROCEDURE — 77067 SCR MAMMO BI INCL CAD: CPT | Performed by: RADIOLOGY

## 2025-02-05 PROCEDURE — 77063 BREAST TOMOSYNTHESIS BI: CPT | Performed by: RADIOLOGY

## 2025-02-28 DIAGNOSIS — I10 ESSENTIAL HYPERTENSION: Chronic | ICD-10-CM

## 2025-02-28 DIAGNOSIS — Z76.0 MEDICATION REFILL: ICD-10-CM

## 2025-02-28 RX ORDER — ENALAPRIL MALEATE 5 MG/1
5 TABLET ORAL DAILY
Qty: 90 TABLET | Refills: 1 | Status: SHIPPED | OUTPATIENT
Start: 2025-02-28

## 2025-03-07 ENCOUNTER — OFFICE VISIT (OUTPATIENT)
Dept: FAMILY MEDICINE CLINIC | Facility: CLINIC | Age: 55
End: 2025-03-07
Payer: COMMERCIAL

## 2025-03-07 VITALS
TEMPERATURE: 98.2 F | DIASTOLIC BLOOD PRESSURE: 84 MMHG | WEIGHT: 210.2 LBS | OXYGEN SATURATION: 97 % | SYSTOLIC BLOOD PRESSURE: 122 MMHG | HEART RATE: 88 BPM | BODY MASS INDEX: 37.25 KG/M2 | HEIGHT: 63 IN

## 2025-03-07 DIAGNOSIS — M62.830 SPASM OF THORACIC BACK MUSCLE: ICD-10-CM

## 2025-03-07 DIAGNOSIS — R82.998 LEUKOCYTES IN URINE: ICD-10-CM

## 2025-03-07 DIAGNOSIS — M54.6 ACUTE RIGHT-SIDED THORACIC BACK PAIN: Primary | ICD-10-CM

## 2025-03-07 LAB
BILIRUB BLD-MCNC: NEGATIVE MG/DL
CLARITY, POC: CLEAR
COLOR UR: ABNORMAL
EXPIRATION DATE: ABNORMAL
GLUCOSE UR STRIP-MCNC: NEGATIVE MG/DL
KETONES UR QL: NEGATIVE
LEUKOCYTE EST, POC: ABNORMAL
Lab: ABNORMAL
NITRITE UR-MCNC: NEGATIVE MG/ML
PH UR: 6 [PH] (ref 5–8)
PROT UR STRIP-MCNC: ABNORMAL MG/DL
RBC # UR STRIP: ABNORMAL /UL
SP GR UR: 1.01 (ref 1–1.03)
UROBILINOGEN UR QL: ABNORMAL

## 2025-03-07 PROCEDURE — 87086 URINE CULTURE/COLONY COUNT: CPT | Performed by: PHYSICIAN ASSISTANT

## 2025-03-07 RX ORDER — KETOROLAC TROMETHAMINE 30 MG/ML
30 INJECTION, SOLUTION INTRAMUSCULAR; INTRAVENOUS ONCE
Status: COMPLETED | OUTPATIENT
Start: 2025-03-07 | End: 2025-03-07

## 2025-03-07 RX ORDER — DICLOFENAC SODIUM 75 MG/1
75 TABLET, DELAYED RELEASE ORAL 2 TIMES DAILY PRN
Qty: 60 TABLET | Refills: 0 | Status: SHIPPED | OUTPATIENT
Start: 2025-03-07

## 2025-03-07 RX ORDER — LITHIUM CARBONATE 300 MG/1
300 TABLET, FILM COATED, EXTENDED RELEASE ORAL 2 TIMES DAILY
COMMUNITY
Start: 2025-02-15

## 2025-03-07 RX ORDER — BACLOFEN 10 MG/1
10 TABLET ORAL 3 TIMES DAILY PRN
Qty: 90 TABLET | Refills: 0 | Status: SHIPPED | OUTPATIENT
Start: 2025-03-07

## 2025-03-07 RX ADMIN — KETOROLAC TROMETHAMINE 30 MG: 30 INJECTION, SOLUTION INTRAMUSCULAR; INTRAVENOUS at 11:06

## 2025-03-07 NOTE — PROGRESS NOTES
Follow Up Office Visit      Patient Name: Patricia Brown  : 1970   MRN: [id number]     Chief Complaint:    Chief Complaint   Patient presents with    Back Pain     Per patient she has pain from shoulder blade down to hip- right side ; started Tuesday morning,        History of Present Illness: Patricia Brown is a 54 y.o. female who is here today with complaints of acute thoracic back pain.    Patient states she woke up on Tuesday morning with severe back pain that begins at her right shoulder blade and radiates down through her thoracic spine.  She states she is having some lumbar back pain as well, though attributes this to her menstrual cycle which she started today.  She has tried multiple over-the-counter medications including ibuprofen, Aleve, Tylenol without relief.  She denies any lower extremity weakness, numbness, tingling, rashes, or focal neurologic deficit. She denies any known injuries.         Subjective        I have reviewed and the following portions of the patient's history were updated as appropriate: past family history, past medical history, past social history, past surgical history and problem list.    Medications:     Current Outpatient Medications:     atorvastatin (LIPITOR) 40 MG tablet, Take 1 tablet by mouth every night at bedtime., Disp: 90 tablet, Rfl: 2    B-D UF III MINI PEN NEEDLES 31G X 5 MM misc, USE 4 TIMES A DAY, Disp: 400 each, Rfl: 3    Blood Glucose Monitoring Suppl (OneTouch Verio Flex System) w/Device kit, USE 1 EACH 4 (FOUR) TIMES A DAY., Disp: 1 kit, Rfl: 1    buPROPion XL (WELLBUTRIN XL) 150 MG 24 hr tablet, Take 1 tablet by mouth Daily., Disp: , Rfl:     Cariprazine HCl 4.5 MG capsule, Take  by mouth Daily., Disp: , Rfl:     clonazePAM (KlonoPIN) 1 MG tablet, , Disp: , Rfl:     colestipol (COLESTID) 1 g tablet, TAKE 1/2-1 TABLET BY MOUTH TWICE DAILY AS NEEDED FOR DIARRHEA, Disp: 60 tablet, Rfl: 0    Continuous Glucose  (Dexcom G7  ) device, Use 1 each Daily., Disp: 1 each, Rfl: 0    Continuous Glucose Sensor (Dexcom G7 Sensor) misc, Use 1 each Every 10 (Ten) Days., Disp: 9 each, Rfl: 3    empagliflozin (Jardiance) 25 MG tablet tablet, Take 1 tablet by mouth Daily., Disp: 90 tablet, Rfl: 1    enalapril (VASOTEC) 5 MG tablet, Take 1 tablet by mouth once daily, Disp: 90 tablet, Rfl: 1    famciclovir (FAMVIR) 500 MG tablet, Take 1 tablet by mouth 3 (Three) Times a Day., Disp: 15 tablet, Rfl: 3    glucose blood (OneTouch Verio) test strip, Use to test blood sugar 4 times daily.  Dx E11.65, Disp: 400 each, Rfl: 3    Insulin Aspart, w/Niacinamide, (Fiasp FlexTouch) 100 UNIT/ML solution pen-injector, 20-24 units with meals plus 2:50>150, MDD 80 units, Disp: 75 mL, Rfl: 1    lithium (LITHOBID) 300 MG CR tablet, Take 1 tablet by mouth 2 (Two) Times a Day., Disp: , Rfl:     OneTouch Delica Lancets 33G misc, Use to test blood sugar 4 times daily.  Dx E11.65, Disp: 400 each, Rfl: 3    Tirzepatide (Mounjaro) 15 MG/0.5ML solution auto-injector, Inject 15 mg under the skin into the appropriate area as directed Every 7 (Seven) Days., Disp: 9 mL, Rfl: 1    topiramate (TOPAMAX) 100 MG tablet, Take 1 tablet by mouth 2 (Two) Times a Day., Disp: , Rfl:     triamcinolone (KENALOG) 0.1 % cream, Apply 1 Application topically to the appropriate area as directed 2 (Two) Times a Day., Disp: 80 g, Rfl: 5    baclofen (LIORESAL) 10 MG tablet, Take 1 tablet by mouth 3 (Three) Times a Day As Needed for Muscle Spasms., Disp: 90 tablet, Rfl: 0    diclofenac (VOLTAREN) 75 MG EC tablet, Take 1 tablet by mouth 2 (Two) Times a Day As Needed (mild to moderate pain)., Disp: 60 tablet, Rfl: 0    Current Facility-Administered Medications:     ketorolac (TORADOL) injection 30 mg, 30 mg, Intramuscular, Once, Cynthia Benoit, LEANNE    Allergies:   Allergies   Allergen Reactions    Ozempic (0.25 Or 0.5 Mg-Dose) [Semaglutide(0.25 Or 0.5mg-Dos)] Swelling     Tongue swells  "      Objective     Physical Exam:   Physical Exam  Constitutional:       General: She is not in acute distress.     Appearance: She is not ill-appearing.   HENT:      Head: Normocephalic.   Cardiovascular:      Rate and Rhythm: Normal rate and regular rhythm.      Heart sounds: No murmur heard.  Pulmonary:      Effort: Pulmonary effort is normal. No respiratory distress.      Breath sounds: Normal breath sounds.   Musculoskeletal:         General: Normal range of motion.      Cervical back: Normal.      Thoracic back: Spasms and tenderness present. No swelling, edema or bony tenderness. Decreased range of motion.      Lumbar back: Normal.      Comments: Bilateral lower extremities neurovascular intact, sensation intact, strength intact   Skin:     General: Skin is warm.   Neurological:      General: No focal deficit present.      Mental Status: She is alert.   Psychiatric:         Mood and Affect: Mood normal.         Vital Signs:   Vitals:    03/07/25 1027   BP: 122/84   Pulse: 88   Temp: 98.2 °F (36.8 °C)   TempSrc: Infrared   SpO2: 97%   Weight: 95.3 kg (210 lb 3.2 oz)   Height: 160 cm (62.99\")   PainSc: 9   Comment: when she moves   PainLoc: Back     Body mass index is 37.24 kg/m².    Procedures    Assessment / Plan         Assessment and Plan     Diagnoses and all orders for this visit:    1. Acute right-sided thoracic back pain (Primary)  -     Cancel: POCT urinalysis dipstick, automated  -     POCT urinalysis dipstick, automated  -     ketorolac (TORADOL) injection 30 mg  -     baclofen (LIORESAL) 10 MG tablet; Take 1 tablet by mouth 3 (Three) Times a Day As Needed for Muscle Spasms.  Dispense: 90 tablet; Refill: 0  -     diclofenac (VOLTAREN) 75 MG EC tablet; Take 1 tablet by mouth 2 (Two) Times a Day As Needed (mild to moderate pain).  Dispense: 60 tablet; Refill: 0    2. Spasm of thoracic back muscle  -     ketorolac (TORADOL) injection 30 mg  -     baclofen (LIORESAL) 10 MG tablet; Take 1 tablet by mouth " 3 (Three) Times a Day As Needed for Muscle Spasms.  Dispense: 90 tablet; Refill: 0  -     diclofenac (VOLTAREN) 75 MG EC tablet; Take 1 tablet by mouth 2 (Two) Times a Day As Needed (mild to moderate pain).  Dispense: 60 tablet; Refill: 0    3. Leukocytes in urine  -     Urine Culture - Urine, Urine, Clean Catch    Suspect symptoms likely secondary to acute muscle spasm/pulled muscle.  Urinalysis reveals trace leukocytes, though patient does not have any additional lower urinary tract symptoms.   No antibiotics indicated at this time, though will send for culture to confirm.  Toradol shot given in office today  Baclofen and diclofenac prescribed to be used as needed  Advised patient to return to the clinic if symptoms persist.  If so, patient would likely benefit from x-ray imaging and/or referral to physical therapy for further management.         Follow Up:   Return if symptoms worsen or fail to improve, for Next scheduled follow up.    Cynthia Benoit PA-C    [facility]

## 2025-03-08 LAB — BACTERIA SPEC AEROBE CULT: NORMAL

## 2025-03-11 ENCOUNTER — OFFICE VISIT (OUTPATIENT)
Dept: ENDOCRINOLOGY | Facility: CLINIC | Age: 55
End: 2025-03-11
Payer: COMMERCIAL

## 2025-03-11 VITALS
HEART RATE: 72 BPM | BODY MASS INDEX: 37.39 KG/M2 | OXYGEN SATURATION: 96 % | HEIGHT: 63 IN | DIASTOLIC BLOOD PRESSURE: 82 MMHG | SYSTOLIC BLOOD PRESSURE: 124 MMHG | WEIGHT: 211 LBS

## 2025-03-11 DIAGNOSIS — E11.65 TYPE 2 DIABETES MELLITUS WITH HYPERGLYCEMIA, WITH LONG-TERM CURRENT USE OF INSULIN: Primary | ICD-10-CM

## 2025-03-11 DIAGNOSIS — Z79.4 TYPE 2 DIABETES MELLITUS WITH HYPERGLYCEMIA, WITH LONG-TERM CURRENT USE OF INSULIN: Primary | ICD-10-CM

## 2025-03-11 LAB
EXPIRATION DATE: ABNORMAL
EXPIRATION DATE: ABNORMAL
GLUCOSE BLDC GLUCOMTR-MCNC: 167 MG/DL (ref 70–130)
HBA1C MFR BLD: 6.9 % (ref 4.5–5.7)
Lab: ABNORMAL
Lab: ABNORMAL

## 2025-03-11 RX ORDER — INSULIN ASPART 100 [IU]/ML
20 INJECTION, SOLUTION INTRAVENOUS; SUBCUTANEOUS
Qty: 15 ML | Refills: 1 | Status: SHIPPED | OUTPATIENT
Start: 2025-03-11

## 2025-03-11 RX ORDER — TIRZEPATIDE 15 MG/.5ML
15 INJECTION, SOLUTION SUBCUTANEOUS
Qty: 9 ML | Refills: 1 | Status: SHIPPED | OUTPATIENT
Start: 2025-03-11

## 2025-03-11 NOTE — PROGRESS NOTES
"Chief Complaint   Patient presents with    Diabetes     Type II          HPI   Patricia Brown is a 54 y.o. female had concerns including Diabetes (Type II).    She is checking blood sugars 4+ times per day with dexcom CGM. Data reviewed from the last two weeks shows average glucose 152 with variability of 19.8%. In target range 84%, high 16%, very high 0%, low 0%, very low 0%.   Pattern of: occasional postprandial hyperglycemia.    Current medications for diabetes include Mounjaro 15 mg weekly.   Has been off the fiasp for months due to backorder.   She stopped the jardiance due to urinary frequency.     She takes atorvastatin 40 mg daily.      The following portions of the patient's history were reviewed and updated as appropriate: allergies, current medications, past family history, past medical history, past social history, past surgical history, and problem list.      Review of Systems   Constitutional: Negative.    Endocrine:        See HPI        Physical Exam  Vitals reviewed.   Constitutional:       Appearance: Normal appearance. She is obese.      Comments: Body mass index is 37.39 kg/m².   Cardiovascular:      Rate and Rhythm: Normal rate.   Pulmonary:      Effort: Pulmonary effort is normal.   Neurological:      General: No focal deficit present.      Mental Status: She is alert. Mental status is at baseline.   Psychiatric:         Mood and Affect: Mood normal.         Behavior: Behavior normal.        /82 (BP Location: Left arm, Patient Position: Sitting, Cuff Size: Adult)   Pulse 72   Ht 160 cm (62.99\")   Wt 95.7 kg (211 lb)   LMP 03/07/2025   SpO2 96%   BMI 37.39 kg/m²      Labs and imaging    A1C:  Lab Results   Component Value Date    HGBA1C 6.9 (A) 03/11/2025    HGBA1C 6.5 (A) 10/29/2024      Glucose:  Lab Results   Component Value Date    POCGLU 167 (A) 03/11/2025      CMP:  Lab Results   Component Value Date    GLUCOSE 91 07/08/2024    BUN 14 07/08/2024    CREATININE 1.06 (H) " 07/08/2024     07/08/2024    K 4.0 07/08/2024     (H) 07/08/2024    CALCIUM 9.3 07/08/2024    PROTEINTOT 7.8 07/08/2024    ALBUMIN 4.5 07/08/2024    ALT 17 07/08/2024    AST 29 07/08/2024    ALKPHOS 95 07/08/2024    BILITOT 0.4 07/08/2024    GLOB 3.3 07/08/2024    AGRATIO 1.4 07/08/2024    BCR 13.2 07/08/2024    ANIONGAP 10.5 07/08/2024    EGFR 63.3 07/08/2024      Lipid Panel:  Lab Results   Component Value Date    CHOL 147 07/08/2024    TRIG 143 07/08/2024    HDL 60 07/08/2024    LDL 63 07/08/2024      Urine Microalbumin:  Lab Results   Component Value Date    MALBCRERATIO  10/29/2024      Comment:      Unable to calculate      TSH:  Lab Results   Component Value Date    TSH 1.540 07/08/2024        Assessment and plan  Diagnoses and all orders for this visit:    1. Type 2 diabetes mellitus with hyperglycemia, with long-term current use of insulin (Primary)  -     POC Glucose, Blood  -     POC Glycosylated Hemoglobin (Hb A1C)  -     empagliflozin (Jardiance) 25 MG tablet tablet; Take 1 tablet by mouth Daily.  -     insulin aspart (NovoLOG FlexPen) 100 UNIT/ML solution pen-injector sc pen; Inject 20 Units under the skin into the appropriate area as directed 3 (Three) Times a Day With Meals. Take as needed for higher carb meals  Dispense: 15 mL; Refill: 1  -     Tirzepatide (Mounjaro) 15 MG/0.5ML solution auto-injector; Inject 15 mg under the skin into the appropriate area as directed Every 7 (Seven) Days.  Dispense: 9 mL; Refill: 1    Uncontrolled with occasional hyperglycemia but A1c is up to 6.9.  No complications from diabetes.  No metformin due to history of GI intolerance.  Continue Mounjaro 15 mg weekly.    Basaglar is no longer required.  Excellent!  Resume prandial insulin. Was on up to 24 units as needed for higher carb meals.    Resume Jardiance 25 mg daily. Consider dose reduction to 10 mg daily due to urinary frequency.   Urine microalbumin normal 10/24.  Labs are up-to-date from July.   Ophtho exam is up-to-date from 2024.  No retinopathy.  Monofilament was updated 10/24.      Return in about 4 months (around 7/11/2025) for next scheduled follow up. The patient was instructed to contact the clinic with any interval questions or concerns.    Electronically signed by: Callie Ramirez DO   Endocrinologist    Please note that portions of this note were completed with a voice recognition program.

## 2025-03-17 LAB — MICROALBUMIN/CREAT UR: 0 MG/G (ref 0–29)

## 2025-03-25 ENCOUNTER — OFFICE VISIT (OUTPATIENT)
Dept: OBSTETRICS AND GYNECOLOGY | Facility: CLINIC | Age: 55
End: 2025-03-25
Payer: COMMERCIAL

## 2025-03-25 VITALS
RESPIRATION RATE: 14 BRPM | SYSTOLIC BLOOD PRESSURE: 126 MMHG | WEIGHT: 209 LBS | BODY MASS INDEX: 37.03 KG/M2 | DIASTOLIC BLOOD PRESSURE: 80 MMHG

## 2025-03-25 DIAGNOSIS — Z71.85 VACCINE COUNSELING: ICD-10-CM

## 2025-03-25 DIAGNOSIS — Z01.419 WELL WOMAN EXAM: Primary | ICD-10-CM

## 2025-03-25 PROBLEM — K52.9 CHRONIC DIARRHEA: Status: RESOLVED | Noted: 2022-11-05 | Resolved: 2025-03-25

## 2025-03-25 PROBLEM — N89.3 VAGINAL DYSPLASIA: Status: RESOLVED | Noted: 2021-06-28 | Resolved: 2025-03-25

## 2025-03-25 PROBLEM — R87.613 HIGH GRADE INTREPITH LESION CYTO SMR CRVX (HGSIL): Status: RESOLVED | Noted: 2021-01-28 | Resolved: 2025-03-25

## 2025-03-25 PROBLEM — L81.9 ATYPICAL PIGMENTED SKIN LESION: Status: RESOLVED | Noted: 2022-11-05 | Resolved: 2025-03-25

## 2025-03-25 PROCEDURE — 99396 PREV VISIT EST AGE 40-64: CPT | Performed by: OBSTETRICS & GYNECOLOGY

## 2025-03-25 NOTE — PROGRESS NOTES
Subjective   Chief Complaint   Patient presents with    Gynecologic Exam     Patricia Brown is a 55 y.o. year old  presenting to be seen for her annual exam.     SEXUAL Hx:  She is not currently sexually active.  In the past year there there has been NO new sexual partners.    Condoms are not needed because she is not sexually active.  She would not like to be screened for STD's at today's exam.  Current birth control method: abstinence.  MENSTRUAL Hx:  Patient's last menstrual period was 2025.  In the past 6 months her cycles have been regular, predictable and occur monthly.  She will occasionally skip a month.  Her menstrual flow is typically light.   Each month on average there are roughly 0 day(s) of very heavy flow.  Intermenstrual bleeding is absent.    Post-coital bleeding is absent.  Dysmenorrhea: is not affecting her activities of daily living  PMS: is not affecting her activities of daily living  Her cycles are not a source of concern for her that she wishes to discuss today.  HEALTH Hx:  She exercises regularly: yes.  She wears her seat belt: yes.  She has concerns about domestic violence: no.    The following portions of the patient's history were reviewed and updated as appropriate:problem list, current medications, allergies, past family history, past medical history, past social history, and past surgical history.    Social History    Tobacco Use      Smoking status: Former        Packs/day: 0.00        Years: 1 pack/day for 20.0 years (20.0 ttl pk-yrs)        Types: Cigarettes        Start date: 1985        Quit date: 2005        Years since quittin.2        Passive exposure: Past      Smokeless tobacco: Never    Review of Systems  Constitutional POS: nothing reported    NEG: anorexia or night sweats   Genitourinary POS: DEEPALI is present but it IS NOT effecting her ADL's, frequency, and it IS NOT effecting her daily living    NEG: dysuria or hematuria       Gastointestinal POS: nothing reported and had colonoscopy in the past 10 years - results are not in record for review    NEG: bloating, change in bowel habits, melena, or reflux symptoms   Integument POS: nothing reported    NEG: moles that are changing in size, shape, color or rashes   Breast POS: nothing reported    NEG: persistent breast lump, skin dimpling, or nipple discharge        Objective   /80   Resp 14   Wt 94.8 kg (209 lb)   LMP 03/07/2025   BMI 37.03 kg/m²     General:  well developed; well nourished  no acute distress  mentation appropriate   Skin:  No suspicious lesions seen   Thyroid: normal to inspection and palpation   Breasts:  Examined in supine position  Symmetric without masses or skin dimpling  Nipples normal without inversion, lesions or discharge  There are no palpable axillary nodes   Abdomen: soft, non-tender; no masses  no umbilical or inguinal hernias are present  no hepato-splenomegaly   Pelvis: Clinical staff was present for exam  External genitalia:  normal appearance of the external genitalia including Bartholin's and [place]'s glands.  :  urethral meatus normal; urethra normal:  Vaginal:  atrophic mucosal changes are present;  Cervix:  Flush with apex  Uterus:  normal size, shape and consistency.  Adnexa:  non palpable bilaterally.  Rectal:  digital rectal exam not performed; anus visually normal appearing.        Assessment   Normal GYN exam  LGSIL and VAIN - 3 normal PAP since   DEEPALI and urinary frequency not impacting activities of daily living  Oligomenorrhea related to perimenopause     Plan   Pap was done today.  If she does not receive the results of the Pap within 2 weeks  time, she was instructed to call to find out the results.  I explained to Patricia that the recommendations for Pap smear interval in a low risk patient's has lengthened to 3 years time.  I encouraged her to be seen yearly for a full physical exam including breast and pelvic exam even during the  off years when PAP's will not be performed.  She was encouraged to get yearly mammograms.  She should report any palpable breast lump(s) or skin changes regardless of mammographic findings.  I explained to Patricia that notification regarding her mammogram results will come from the center performing the study.  Our office will not be routinely calling with mammogram results.  It is her responsibility to make sure that the results from the mammogram are communicated to her by the breast center.  If she has any questions about the results, she is welcome to call our office anytime.  The following data needs to be obtained to update her medical records: last colonoscopy and pathology reports.  I have counseled the patient on the importance of staying up to date with preventive vaccines as well as the risks and benefits of these vaccines.  Her vaccine record was reviewed and updated.  I discussed with Patricia that she may be behind on needed vaccinations for Shingles [Shingrix].  She may be able to obtain these vaccinations at her local pharmacy OR speak about obtaining them with her primary care.  If she does obtain her vaccines, I have asked Patricia to let us know the date each vaccine was obtained so that her medical record could be updated in our system.  The importance of keeping all planned follow-up and taking all medications as prescribed was emphasized.  Follow up for annual exam 1 year           This note was electronically signed.    Tyson Iyer M.D.  March 25, 2025    Part of this note may be an electronic transcription/translation of spoken language to printed text using the Dragon Dictation System.

## 2025-03-25 NOTE — PATIENT INSTRUCTIONS
Zoster Vaccine, Recombinant injection (Shingrix)      What is this medicine?  ZOSTER VACCINE (ZOS ter vak SEEN) is used to prevent shingles in adults 50 years old and over. This vaccine is not used to treat shingles or nerve pain from shingles.  This medicine may be used for other purposes; ask your health care provider or pharmacist if you have questions.    What should I tell my health care provider before I take this medicine?  They need to know if you have any of these conditions:  blood disorders or disease  cancer like leukemia or lymphoma  immune system problems or therapy  an unusual or allergic reaction to vaccines, other medications, foods, dyes, or preservatives  pregnant or trying to get pregnant  breast-feeding    How should I use this medicine?  This vaccine is for injection in a muscle. It is given by a health care professional.  The vaccine series requires 2 doses for full effect  The second dose should be given somewhere between 2-6 months after the initial injection is given.    What if I miss a dose?  Keep appointments for follow-up (booster) doses as directed. It is important not to miss your dose.   Call your doctor or health care professional if you are unable to keep an appointment.    What may interact with this medicine?  medicines that suppress your immune system  medicines to treat cancer  steroid medicines like prednisone or cortisone    This list may not describe all possible interactions. Give your health care provider a list of all the medicines, herbs, non-prescription drugs, or dietary supplements you use. Also tell them if you smoke, drink alcohol, or use illegal drugs. Some items may interact with your medicine.    What should I watch for while using this medicine?  Visit your doctor for regular check ups.  This vaccine, like all vaccines, may not fully protect everyone.    What side effects may I notice from receiving this medicine?  Side effects that you should report to your  doctor or health care professional as soon as possible:  allergic reactions like skin rash, itching or hives, swelling of the face, lips, or tongue  breathing problems  Side effects that usually do not require medical attention (report these to your doctor or health care professional if they continue or are bothersome):  chills  headache  fever  nausea, vomiting  redness, warmth, pain, swelling or itching at site where injected  tiredness  This list may not describe all possible side effects. Call your doctor for medical advice about side effects. You may report side effects to FDA at 9-883-FDA-0189.    Where should I keep my medicine?  This vaccine is only given in a clinic, pharmacy, doctor's office, or other health care setting and will not be stored at home.  NOTE: This sheet is a summary. It may not cover all possible information. If you have questions about this medicine, talk to your doctor, pharmacist, or health care provider.  © 2019 [employer] (2018-07-30 13:20:30)

## 2025-03-27 LAB — REF LAB TEST METHOD: NORMAL

## 2025-04-22 ENCOUNTER — OFFICE VISIT (OUTPATIENT)
Dept: FAMILY MEDICINE CLINIC | Facility: CLINIC | Age: 55
End: 2025-04-22
Payer: COMMERCIAL

## 2025-04-22 VITALS
SYSTOLIC BLOOD PRESSURE: 118 MMHG | TEMPERATURE: 97.7 F | HEART RATE: 81 BPM | HEIGHT: 63 IN | BODY MASS INDEX: 36.43 KG/M2 | OXYGEN SATURATION: 98 % | WEIGHT: 205.6 LBS | DIASTOLIC BLOOD PRESSURE: 84 MMHG

## 2025-04-22 DIAGNOSIS — E11.9 TYPE 2 DIABETES MELLITUS WITHOUT COMPLICATION, WITHOUT LONG-TERM CURRENT USE OF INSULIN: ICD-10-CM

## 2025-04-22 DIAGNOSIS — Z12.11 SCREEN FOR COLON CANCER: ICD-10-CM

## 2025-04-22 DIAGNOSIS — I10 ESSENTIAL HYPERTENSION: Primary | ICD-10-CM

## 2025-04-22 DIAGNOSIS — E78.2 MIXED HYPERLIPIDEMIA: ICD-10-CM

## 2025-04-22 NOTE — PROGRESS NOTES
Follow Up Office Visit    Date: 2025   Patient Name: Patricia Brown  : 1970   MRN: [id number]     Chief Complaint:    Chief Complaint   Patient presents with    Hypertension       History of Present Illness:   Patricia Brown is a 55 y.o. female.  History of Present Illness  The patient presents for evaluation of blood pressure, diabetes, granuloma annulare, and diverticulitis.    Blood pressure remains within normal limits as long as enalapril 5 mg is taken regularly. No issues have been reported.    Diabetes management is effective, allowing discontinuation of insulin and control with Mounjaro. Weight has decreased from 217 pounds in 10/2024 to 205 pounds currently.    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 Lesions are beginning to fade from red to skin color but are not completely resolving, instead forming red rings. The prescribed cream from the dermatologist has been discontinued due to lack of efficacy.    A colonoscopy and endoscopy were performed approximately 10 years ago, prior to relocating from [place]. A mild case of diverticulitis was diagnosed at that time. No family history of colon cancer or polyps is reported. There is a desire to avoid another colonoscopy.    Recent Pap smear and mammogram results are normal. Pap smears were previously conducted every 3 months. Sleep quality is reported as good. Thyroid function was tested in 2024 and results were normal.    PAST SURGICAL HISTORY:  - Colonoscopy and endoscopy approximately 10 years ago.    FAMILY HISTORY  She does not have any family history of colon cancer or polyps.        Subjective    Review of systems:  Review of Systems     I have reviewed and the following portions of the patient's history were updated as appropriate: past family history, past medical history, past social history, past surgical history and problem list.    Medications:     Current Outpatient Medications:     atorvastatin (LIPITOR) 40  MG tablet, Take 1 tablet by mouth every night at bedtime., Disp: 90 tablet, Rfl: 2    B-D UF III MINI PEN NEEDLES 31G X 5 MM misc, USE 4 TIMES A DAY, Disp: 400 each, Rfl: 3    Blood Glucose Monitoring Suppl (OneTouch Verio Flex System) w/Device kit, USE 1 EACH 4 (FOUR) TIMES A DAY., Disp: 1 kit, Rfl: 1    buPROPion XL (WELLBUTRIN XL) 150 MG 24 hr tablet, Take 1 tablet by mouth Daily., Disp: , Rfl:     Cariprazine HCl 4.5 MG capsule, Take  by mouth Daily., Disp: , Rfl:     clonazePAM (KlonoPIN) 1 MG tablet, , Disp: , Rfl:     Continuous Glucose  (Dexcom G7 ) device, Use 1 each Daily., Disp: 1 each, Rfl: 0    Continuous Glucose Sensor (Dexcom G7 Sensor) misc, Use 1 each Every 10 (Ten) Days., Disp: 9 each, Rfl: 3    empagliflozin (Jardiance) 25 MG tablet tablet, Take 1 tablet by mouth Daily., Disp: , Rfl:     enalapril (VASOTEC) 5 MG tablet, Take 1 tablet by mouth once daily, Disp: 90 tablet, Rfl: 1    famciclovir (FAMVIR) 500 MG tablet, Take 1 tablet by mouth 3 (Three) Times a Day., Disp: 15 tablet, Rfl: 3    glucose blood (OneTouch Verio) test strip, Use to test blood sugar 4 times daily.  Dx E11.65, Disp: 400 each, Rfl: 3    lithium (LITHOBID) 300 MG CR tablet, Take 1 tablet by mouth 2 (Two) Times a Day., Disp: , Rfl:     OneTouch Delica Lancets 33G misc, Use to test blood sugar 4 times daily.  Dx E11.65, Disp: 400 each, Rfl: 3    Tirzepatide (Mounjaro) 15 MG/0.5ML solution auto-injector, Inject 15 mg under the skin into the appropriate area as directed Every 7 (Seven) Days., Disp: 9 mL, Rfl: 1    topiramate (TOPAMAX) 100 MG tablet, Take 1 tablet by mouth 2 (Two) Times a Day., Disp: , Rfl:     triamcinolone (KENALOG) 0.1 % cream, Apply 1 Application topically to the appropriate area as directed 2 (Two) Times a Day., Disp: 80 g, Rfl: 5    baclofen (LIORESAL) 10 MG tablet, Take 1 tablet by mouth 3 (Three) Times a Day As Needed for Muscle Spasms. (Patient not taking: Reported on 4/22/2025), Disp: 90  "tablet, Rfl: 0    diclofenac (VOLTAREN) 75 MG EC tablet, Take 1 tablet by mouth 2 (Two) Times a Day As Needed (mild to moderate pain). (Patient not taking: Reported on 4/22/2025), Disp: 60 tablet, Rfl: 0    insulin aspart (NovoLOG FlexPen) 100 UNIT/ML solution pen-injector sc pen, Inject 20 Units under the skin into the appropriate area as directed 3 (Three) Times a Day With Meals. Take as needed for higher carb meals (Patient not taking: Reported on 4/22/2025), Disp: 15 mL, Rfl: 1    Allergies:   Allergies   Allergen Reactions    Ozempic (0.25 Or 0.5 Mg-Dose) [Semaglutide(0.25 Or 0.5mg-Dos)] Swelling     Tongue swells       Objective   Vital Signs:   Vitals:    04/22/25 0817   BP: 118/84   Pulse: 81   Temp: 97.7 °F (36.5 °C)   TempSrc: Infrared   SpO2: 98%   Weight: 93.3 kg (205 lb 9.6 oz)   Height: 160 cm (63\")     Body mass index is 36.42 kg/m².          Physical Exam:   Physical Exam  Vitals and nursing note reviewed.   Constitutional:       Appearance: Normal appearance.   HENT:      Head: Normocephalic and atraumatic.   Cardiovascular:      Rate and Rhythm: Normal rate and regular rhythm.   Pulmonary:      Effort: Pulmonary effort is normal.      Breath sounds: Normal breath sounds. No decreased breath sounds, wheezing, rhonchi or rales.   Musculoskeletal:      Cervical back: Neck supple.      Right lower leg: No edema.      Left lower leg: No edema.   Lymphadenopathy:      Cervical: No cervical adenopathy.   Neurological:      Mental Status: She is alert.          Procedures     Assessment / Plan    Assessment/Plan:   Diagnoses and all orders for this visit:    1. Essential hypertension (Primary)    2. Screen for colon cancer  -     Cologuard - Stool, Per Rectum; Future    3. Mixed hyperlipidemia    4. Type 2 diabetes mellitus without complication, without long-term current use of insulin       Assessment & Plan  1. Hypertension.  - Blood pressure readings are within the normal range today.  - Currently " taking enalapril 5 mg daily, which is effective in maintaining blood pressure.  - No reported issues with blood pressure control.  - No need for medication refill at this time.    2. Diabetes Mellitus.  - Diabetes is well-controlled with Mounjaro.  - Discontinued insulin and reports weight loss from 217 lbs in 10/2024 to 205 lbs currently.  - No need for changes in diabetes management.  - Continue monitoring weight and blood glucose levels.    3. Granuloma Annulare.  - Lesions are starting to fade from red to skin color but are not completely resolved.  - Stopped using the prescribed cream due to ineffectiveness.  - Lesions are turning into red rings.  - Continue monitoring skin changes and consider dermatology follow-up if necessary.    4. Diverticulitis.  - History of mild diverticulitis diagnosed during last colonoscopy approximately 10 years ago.  - Advised to eat whatever she wants unless it causes discomfort.  - No current symptoms or dietary restrictions.  - Continue monitoring for any gastrointestinal symptoms.    5. Health Maintenance.  - Due for colon cancer screening; prefers not to undergo colonoscopy.  - Most recent Pap smear and mammogram returned normal results.  - Cologuard test will be ordered as an alternative to colonoscopy.  - If Cologuard test shows abnormalities, consider colonoscopy.    Follow-up  - Follow up in 10/2025 for physical.      Follow Up:   Return in about 6 months (around 10/22/2025) for Annual.    Patient or patient representative verbalized consent for the use of Ambient Listening during the visit with  Josee Branch PA-C for chart documentation. 4/22/2025  08:52 EDT    Josee Branch PA-C   [facility] [place]

## 2025-05-27 DIAGNOSIS — E78.2 MIXED HYPERLIPIDEMIA: ICD-10-CM

## 2025-05-27 NOTE — TELEPHONE ENCOUNTER
Rx Refill Note  Requested Prescriptions     Pending Prescriptions Disp Refills    atorvastatin (LIPITOR) 40 MG tablet [Pharmacy Med Name: Atorvastatin Calcium 40 MG Oral Tablet] 90 tablet 0     Sig: TAKE 1 TABLET BY MOUTH EVERY DAY AT BEDTIME      Last office visit with prescribing clinician: 3/11/2025     Next office visit with prescribing clinician: 8/13/2025     Nida aCballero MA  05/27/25, 14:45 EDT

## 2025-05-28 RX ORDER — ATORVASTATIN CALCIUM 40 MG/1
40 TABLET, FILM COATED ORAL
Qty: 90 TABLET | Refills: 3 | Status: SHIPPED | OUTPATIENT
Start: 2025-05-28

## 2025-07-08 DIAGNOSIS — E11.65 TYPE 2 DIABETES MELLITUS WITH HYPERGLYCEMIA, WITH LONG-TERM CURRENT USE OF INSULIN: ICD-10-CM

## 2025-07-08 DIAGNOSIS — Z79.4 TYPE 2 DIABETES MELLITUS WITH HYPERGLYCEMIA, WITH LONG-TERM CURRENT USE OF INSULIN: ICD-10-CM

## 2025-07-08 RX ORDER — ACYCLOVIR 400 MG/1
1 TABLET ORAL
Qty: 9 EACH | Refills: 1 | Status: SHIPPED | OUTPATIENT
Start: 2025-07-08

## 2025-07-08 NOTE — TELEPHONE ENCOUNTER
Rx Refill Note  Requested Prescriptions     Pending Prescriptions Disp Refills    Continuous Glucose Sensor (Dexcom G7 Sensor) misc 9 each 3     Sig: Use 1 each Every 10 (Ten) Days.      Last office visit with prescribing clinician: 3/11/2025     Next office visit with prescribing clinician: 8/13/2025

## 2025-07-08 NOTE — TELEPHONE ENCOUNTER
Caller: Patricia Brown    Relationship: Self    Best call back number: [phone]    Requested Prescriptions:   Requested Prescriptions     Pending Prescriptions Disp Refills    Continuous Glucose Sensor (Dexcom G7 Sensor) misc 9 each 3     Sig: Use 1 each Every 10 (Ten) Days.        Pharmacy where request should be sent:    [facility]  Last office visit with prescribing clinician: 3/11/2025   Last telemedicine visit with prescribing clinician: Visit date not found   Next office visit with prescribing clinician: 8/13/2025     Additional details provided by patient:      Does the patient have less than a 3 day supply:  [x] Yes  [] No    Would you like a call back once the refill request has been completed: [] Yes [] No    If the office needs to give you a call back, can they leave a voicemail: [] Yes [] No    Gino Almonte Rep   07/08/25 14:06 EDT

## 2025-08-13 ENCOUNTER — OFFICE VISIT (OUTPATIENT)
Dept: ENDOCRINOLOGY | Facility: CLINIC | Age: 55
End: 2025-08-13
Payer: COMMERCIAL

## 2025-08-13 VITALS
OXYGEN SATURATION: 99 % | BODY MASS INDEX: 34.62 KG/M2 | HEART RATE: 70 BPM | SYSTOLIC BLOOD PRESSURE: 124 MMHG | DIASTOLIC BLOOD PRESSURE: 82 MMHG | HEIGHT: 63 IN | WEIGHT: 195.4 LBS

## 2025-08-13 DIAGNOSIS — E78.2 MIXED HYPERLIPIDEMIA: ICD-10-CM

## 2025-08-13 DIAGNOSIS — Z79.4 TYPE 2 DIABETES MELLITUS WITH HYPERGLYCEMIA, WITH LONG-TERM CURRENT USE OF INSULIN: Primary | ICD-10-CM

## 2025-08-13 DIAGNOSIS — E11.65 TYPE 2 DIABETES MELLITUS WITH HYPERGLYCEMIA, WITH LONG-TERM CURRENT USE OF INSULIN: Primary | ICD-10-CM

## 2025-08-13 PROBLEM — E66.811 CLASS 1 OBESITY DUE TO EXCESS CALORIES WITH SERIOUS COMORBIDITY AND BODY MASS INDEX (BMI) OF 34.0 TO 34.9 IN ADULT: Status: ACTIVE | Noted: 2020-07-07

## 2025-08-13 PROBLEM — E66.09 CLASS 1 OBESITY DUE TO EXCESS CALORIES WITH SERIOUS COMORBIDITY AND BODY MASS INDEX (BMI) OF 34.0 TO 34.9 IN ADULT: Status: ACTIVE | Noted: 2020-07-07

## 2025-08-13 LAB
ALBUMIN SERPL-MCNC: 4.4 G/DL (ref 3.5–5.2)
ALBUMIN UR-MCNC: <1.2 MG/DL
ALBUMIN/GLOB SERPL: 1.5 G/DL
ALP SERPL-CCNC: 82 U/L (ref 39–117)
ALT SERPL W P-5'-P-CCNC: 13 U/L (ref 1–33)
ANION GAP SERPL CALCULATED.3IONS-SCNC: 11.5 MMOL/L (ref 5–15)
AST SERPL-CCNC: 20 U/L (ref 1–32)
BILIRUB SERPL-MCNC: 0.4 MG/DL (ref 0–1.2)
BUN SERPL-MCNC: 17 MG/DL (ref 6–20)
BUN/CREAT SERPL: 15 (ref 7–25)
CALCIUM SPEC-SCNC: 9.8 MG/DL (ref 8.6–10.5)
CHLORIDE SERPL-SCNC: 109 MMOL/L (ref 98–107)
CHOLEST SERPL-MCNC: 137 MG/DL (ref 0–200)
CO2 SERPL-SCNC: 21.5 MMOL/L (ref 22–29)
CREAT SERPL-MCNC: 1.13 MG/DL (ref 0.57–1)
CREAT UR-MCNC: 30.5 MG/DL
DEPRECATED RDW RBC AUTO: 40.6 FL (ref 37–54)
EGFRCR SERPLBLD CKD-EPI 2021: 58.3 ML/MIN/1.73
ERYTHROCYTE [DISTWIDTH] IN BLOOD BY AUTOMATED COUNT: 12.4 % (ref 12.3–15.4)
EXPIRATION DATE: ABNORMAL
EXPIRATION DATE: ABNORMAL
GLOBULIN UR ELPH-MCNC: 2.9 GM/DL
GLUCOSE BLDC GLUCOMTR-MCNC: 140 MG/DL (ref 70–130)
GLUCOSE SERPL-MCNC: 127 MG/DL (ref 65–99)
HBA1C MFR BLD: 6.5 % (ref 4.5–5.7)
HCT VFR BLD AUTO: 46.9 % (ref 34–46.6)
HDLC SERPL-MCNC: 52 MG/DL (ref 40–60)
HGB BLD-MCNC: 15.1 G/DL (ref 12–15.9)
LDLC SERPL CALC-MCNC: 59 MG/DL (ref 0–100)
LDLC/HDLC SERPL: 1.04 {RATIO}
Lab: ABNORMAL
Lab: ABNORMAL
MCH RBC QN AUTO: 29.3 PG (ref 26.6–33)
MCHC RBC AUTO-ENTMCNC: 32.2 G/DL (ref 31.5–35.7)
MCV RBC AUTO: 90.9 FL (ref 79–97)
MICROALBUMIN/CREAT UR: NORMAL MG/G{CREAT}
PLATELET # BLD AUTO: 210 10*3/MM3 (ref 140–450)
PMV BLD AUTO: 12.4 FL (ref 6–12)
POTASSIUM SERPL-SCNC: 4.4 MMOL/L (ref 3.5–5.2)
PROT SERPL-MCNC: 7.3 G/DL (ref 6–8.5)
RBC # BLD AUTO: 5.16 10*6/MM3 (ref 3.77–5.28)
SODIUM SERPL-SCNC: 142 MMOL/L (ref 136–145)
TRIGL SERPL-MCNC: 154 MG/DL (ref 0–150)
TSH SERPL DL<=0.05 MIU/L-ACNC: 1.29 UIU/ML (ref 0.27–4.2)
VLDLC SERPL-MCNC: 26 MG/DL (ref 5–40)
WBC NRBC COR # BLD AUTO: 7.76 10*3/MM3 (ref 3.4–10.8)

## 2025-08-13 PROCEDURE — 82570 ASSAY OF URINE CREATININE: CPT | Performed by: INTERNAL MEDICINE

## 2025-08-13 PROCEDURE — 80050 GENERAL HEALTH PANEL: CPT | Performed by: INTERNAL MEDICINE

## 2025-08-13 PROCEDURE — 80061 LIPID PANEL: CPT | Performed by: INTERNAL MEDICINE

## 2025-08-13 PROCEDURE — 82043 UR ALBUMIN QUANTITATIVE: CPT | Performed by: INTERNAL MEDICINE

## 2025-08-13 RX ORDER — INSULIN ASPART 100 [IU]/ML
INJECTION, SOLUTION INTRAVENOUS; SUBCUTANEOUS
Qty: 15 ML | Refills: 1 | Status: SHIPPED | OUTPATIENT
Start: 2025-08-13

## 2025-08-13 RX ORDER — TIRZEPATIDE 15 MG/.5ML
15 INJECTION, SOLUTION SUBCUTANEOUS
Qty: 9 ML | Refills: 3 | Status: SHIPPED | OUTPATIENT
Start: 2025-08-13

## 2025-08-22 DIAGNOSIS — I10 ESSENTIAL HYPERTENSION: Chronic | ICD-10-CM

## 2025-08-22 DIAGNOSIS — Z76.0 MEDICATION REFILL: ICD-10-CM

## 2025-08-22 RX ORDER — ENALAPRIL MALEATE 5 MG/1
5 TABLET ORAL DAILY
Qty: 90 TABLET | Refills: 1 | Status: SHIPPED | OUTPATIENT
Start: 2025-08-22